# Patient Record
Sex: FEMALE | Race: WHITE | NOT HISPANIC OR LATINO | Employment: OTHER | URBAN - METROPOLITAN AREA
[De-identification: names, ages, dates, MRNs, and addresses within clinical notes are randomized per-mention and may not be internally consistent; named-entity substitution may affect disease eponyms.]

---

## 2023-08-11 PROBLEM — G45.9 TIA (TRANSIENT ISCHEMIC ATTACK): Status: ACTIVE | Noted: 2023-08-11

## 2023-08-11 PROBLEM — I10 HYPERTENSION: Status: ACTIVE | Noted: 2023-08-11

## 2023-08-11 PROBLEM — R73.9 HYPERGLYCEMIA: Status: ACTIVE | Noted: 2023-08-11

## 2023-08-11 PROBLEM — N18.30 CKD (CHRONIC KIDNEY DISEASE), STAGE III (MULTI): Status: ACTIVE | Noted: 2023-08-11

## 2023-08-11 PROBLEM — R53.83 FATIGUE: Status: ACTIVE | Noted: 2023-08-11

## 2023-08-11 PROBLEM — G60.0 CHARCOT-MARIE-TOOTH SYNDROME: Status: ACTIVE | Noted: 2023-08-11

## 2023-08-11 PROBLEM — E78.5 HYPERLIPEMIA: Status: ACTIVE | Noted: 2023-08-11

## 2023-08-11 PROBLEM — E55.9 VITAMIN D DEFICIENCY: Status: ACTIVE | Noted: 2023-08-11

## 2023-10-01 DIAGNOSIS — I10 PRIMARY HYPERTENSION: Primary | ICD-10-CM

## 2023-10-03 RX ORDER — LISINOPRIL 10 MG/1
10 TABLET ORAL DAILY
Qty: 90 TABLET | Refills: 0 | Status: SHIPPED | OUTPATIENT
Start: 2023-10-03

## 2023-10-03 RX ORDER — HYDROCHLOROTHIAZIDE 25 MG/1
25 TABLET ORAL DAILY
Qty: 90 TABLET | Refills: 0 | Status: SHIPPED | OUTPATIENT
Start: 2023-10-03 | End: 2024-02-27 | Stop reason: HOSPADM

## 2024-02-13 ENCOUNTER — APPOINTMENT (OUTPATIENT)
Dept: CARDIOLOGY | Facility: HOSPITAL | Age: 74
DRG: 056 | End: 2024-02-13
Payer: MEDICARE

## 2024-02-13 ENCOUNTER — APPOINTMENT (OUTPATIENT)
Dept: RADIOLOGY | Facility: HOSPITAL | Age: 74
DRG: 056 | End: 2024-02-13
Payer: MEDICARE

## 2024-02-13 ENCOUNTER — HOSPITAL ENCOUNTER (INPATIENT)
Facility: HOSPITAL | Age: 74
LOS: 14 days | Discharge: NURSING, REHAB OR LONG-TERM CARE | DRG: 056 | End: 2024-02-27
Attending: INTERNAL MEDICINE | Admitting: INTERNAL MEDICINE
Payer: MEDICARE

## 2024-02-13 DIAGNOSIS — R41.82 ALTERED MENTAL STATUS, UNSPECIFIED ALTERED MENTAL STATUS TYPE: Primary | ICD-10-CM

## 2024-02-13 PROBLEM — R41.0 CONFUSION: Status: ACTIVE | Noted: 2024-02-13

## 2024-02-13 LAB
ALBUMIN SERPL BCP-MCNC: 3.3 G/DL (ref 3.4–5)
ALP SERPL-CCNC: 80 U/L (ref 33–136)
ALT SERPL W P-5'-P-CCNC: 20 U/L (ref 7–45)
ANION GAP SERPL CALC-SCNC: 12 MMOL/L (ref 10–20)
AST SERPL W P-5'-P-CCNC: 35 U/L (ref 9–39)
BASOPHILS # BLD AUTO: 0.03 X10*3/UL (ref 0–0.1)
BASOPHILS NFR BLD AUTO: 0.4 %
BILIRUB SERPL-MCNC: 0.6 MG/DL (ref 0–1.2)
BUN SERPL-MCNC: 20 MG/DL (ref 6–23)
CALCIUM SERPL-MCNC: 8.8 MG/DL (ref 8.6–10.3)
CARDIAC TROPONIN I PNL SERPL HS: 31 NG/L (ref 0–13)
CARDIAC TROPONIN I PNL SERPL HS: 31 NG/L (ref 0–13)
CHLORIDE SERPL-SCNC: 106 MMOL/L (ref 98–107)
CHOLEST SERPL-MCNC: 260 MG/DL (ref 0–199)
CHOLESTEROL/HDL RATIO: 5.6
CK SERPL-CCNC: 213 U/L (ref 0–215)
CO2 SERPL-SCNC: 25 MMOL/L (ref 21–32)
CREAT SERPL-MCNC: 1.09 MG/DL (ref 0.5–1.05)
EGFRCR SERPLBLD CKD-EPI 2021: 54 ML/MIN/1.73M*2
EOSINOPHIL # BLD AUTO: 0.03 X10*3/UL (ref 0–0.4)
EOSINOPHIL NFR BLD AUTO: 0.4 %
ERYTHROCYTE [DISTWIDTH] IN BLOOD BY AUTOMATED COUNT: 13.2 % (ref 11.5–14.5)
GLUCOSE SERPL-MCNC: 103 MG/DL (ref 74–99)
HCT VFR BLD AUTO: 33.1 % (ref 36–46)
HDLC SERPL-MCNC: 46.5 MG/DL
HGB BLD-MCNC: 10.6 G/DL (ref 12–16)
IMM GRANULOCYTES # BLD AUTO: 0.01 X10*3/UL (ref 0–0.5)
IMM GRANULOCYTES NFR BLD AUTO: 0.1 % (ref 0–0.9)
LACTATE SERPL-SCNC: 0.5 MMOL/L (ref 0.4–2)
LDLC SERPL CALC-MCNC: 196 MG/DL
LYMPHOCYTES # BLD AUTO: 1.13 X10*3/UL (ref 0.8–3)
LYMPHOCYTES NFR BLD AUTO: 16.5 %
MAGNESIUM SERPL-MCNC: 1.9 MG/DL (ref 1.6–2.4)
MCH RBC QN AUTO: 31 PG (ref 26–34)
MCHC RBC AUTO-ENTMCNC: 32 G/DL (ref 32–36)
MCV RBC AUTO: 97 FL (ref 80–100)
MONOCYTES # BLD AUTO: 0.53 X10*3/UL (ref 0.05–0.8)
MONOCYTES NFR BLD AUTO: 7.7 %
NEUTROPHILS # BLD AUTO: 5.12 X10*3/UL (ref 1.6–5.5)
NEUTROPHILS NFR BLD AUTO: 74.9 %
NON HDL CHOLESTEROL: 214 MG/DL (ref 0–149)
NRBC BLD-RTO: 0 /100 WBCS (ref 0–0)
PLATELET # BLD AUTO: 251 X10*3/UL (ref 150–450)
POTASSIUM SERPL-SCNC: 4.2 MMOL/L (ref 3.5–5.3)
PROT SERPL-MCNC: 6.6 G/DL (ref 6.4–8.2)
RBC # BLD AUTO: 3.42 X10*6/UL (ref 4–5.2)
SODIUM SERPL-SCNC: 139 MMOL/L (ref 136–145)
TRIGL SERPL-MCNC: 90 MG/DL (ref 0–149)
VLDL: 18 MG/DL (ref 0–40)
WBC # BLD AUTO: 6.9 X10*3/UL (ref 4.4–11.3)

## 2024-02-13 PROCEDURE — 2500000004 HC RX 250 GENERAL PHARMACY W/ HCPCS (ALT 636 FOR OP/ED): Performed by: EMERGENCY MEDICINE

## 2024-02-13 PROCEDURE — 82550 ASSAY OF CK (CPK): CPT | Performed by: INTERNAL MEDICINE

## 2024-02-13 PROCEDURE — 84484 ASSAY OF TROPONIN QUANT: CPT | Performed by: EMERGENCY MEDICINE

## 2024-02-13 PROCEDURE — 80053 COMPREHEN METABOLIC PANEL: CPT | Performed by: INTERNAL MEDICINE

## 2024-02-13 PROCEDURE — 84484 ASSAY OF TROPONIN QUANT: CPT | Performed by: INTERNAL MEDICINE

## 2024-02-13 PROCEDURE — 80061 LIPID PANEL: CPT | Performed by: PHYSICIAN ASSISTANT

## 2024-02-13 PROCEDURE — 36415 COLL VENOUS BLD VENIPUNCTURE: CPT | Performed by: INTERNAL MEDICINE

## 2024-02-13 PROCEDURE — 71045 X-RAY EXAM CHEST 1 VIEW: CPT

## 2024-02-13 PROCEDURE — 70450 CT HEAD/BRAIN W/O DYE: CPT

## 2024-02-13 PROCEDURE — 99223 1ST HOSP IP/OBS HIGH 75: CPT | Performed by: PHYSICIAN ASSISTANT

## 2024-02-13 PROCEDURE — 99285 EMERGENCY DEPT VISIT HI MDM: CPT | Mod: 25 | Performed by: INTERNAL MEDICINE

## 2024-02-13 PROCEDURE — 1210000001 HC SEMI-PRIVATE ROOM DAILY

## 2024-02-13 PROCEDURE — 70450 CT HEAD/BRAIN W/O DYE: CPT | Performed by: RADIOLOGY

## 2024-02-13 PROCEDURE — 93005 ELECTROCARDIOGRAM TRACING: CPT

## 2024-02-13 PROCEDURE — 83735 ASSAY OF MAGNESIUM: CPT | Performed by: INTERNAL MEDICINE

## 2024-02-13 PROCEDURE — 71045 X-RAY EXAM CHEST 1 VIEW: CPT | Performed by: RADIOLOGY

## 2024-02-13 PROCEDURE — 83605 ASSAY OF LACTIC ACID: CPT | Performed by: INTERNAL MEDICINE

## 2024-02-13 PROCEDURE — 85025 COMPLETE CBC W/AUTO DIFF WBC: CPT | Performed by: INTERNAL MEDICINE

## 2024-02-13 RX ORDER — OLANZAPINE 10 MG/2ML
2.5 INJECTION, POWDER, FOR SOLUTION INTRAMUSCULAR EVERY 6 HOURS PRN
Status: DISCONTINUED | OUTPATIENT
Start: 2024-02-13 | End: 2024-02-18

## 2024-02-13 RX ADMIN — OLANZAPINE 2.5 MG: 10 INJECTION, POWDER, FOR SOLUTION INTRAMUSCULAR at 23:21

## 2024-02-13 ASSESSMENT — COLUMBIA-SUICIDE SEVERITY RATING SCALE - C-SSRS
6. HAVE YOU EVER DONE ANYTHING, STARTED TO DO ANYTHING, OR PREPARED TO DO ANYTHING TO END YOUR LIFE?: NO
2. HAVE YOU ACTUALLY HAD ANY THOUGHTS OF KILLING YOURSELF?: NO
1. IN THE PAST MONTH, HAVE YOU WISHED YOU WERE DEAD OR WISHED YOU COULD GO TO SLEEP AND NOT WAKE UP?: NO

## 2024-02-13 ASSESSMENT — PAIN - FUNCTIONAL ASSESSMENT: PAIN_FUNCTIONAL_ASSESSMENT: 0-10

## 2024-02-13 ASSESSMENT — ACTIVITIES OF DAILY LIVING (ADL): LACK_OF_TRANSPORTATION: PATIENT UNABLE TO ANSWER

## 2024-02-13 ASSESSMENT — PAIN SCALES - GENERAL: PAINLEVEL_OUTOF10: 0 - NO PAIN

## 2024-02-13 NOTE — ED PROVIDER NOTES
HPI     CC: Altered Mental Status (Pt from home, increased confusion, pt has been hallucinating, seeing people that aren't there, pt last seen by family on Friday. Deplorable living conditions per EMS, social work consult needed. Pt a&O x3 in triage. Pts BP elevated, but has not been taking BP meds per EMS)     HPI: Heather Romo is a 73 y.o. female with a history of HTN, HLD, TIA or CVA, CKD, Charcot-Rylie-Tooth, presents with confusion.  Patient herself is unable to provide a helpful history, states she is here because her blood pressure was elevated.  She is able to tell me that she lives alone in a house, that she has a sister around but that no one helps her on a daily basis.  States that she does not utilize any assistive devices to get around.  She denies feeling confused.  According to her sister who later came to bedside, she last saw the patient 4 days ago and she seemed okay.  The week prior patient had seemed like she was having difficulty getting the words out but this had resolved when she saw her last time.  Today, patient was not answering the phone.  When sister went to the house it was dark and the TV was off so she called 911.  The patient did eventually come to the door, was ambulatory but she seemed very confused.  She was talking about her  not being home even though he  40 years ago and was saying that she was going to see her mother who  3 years ago.   Sister wonders if maybe she had fallen.  She follows with Dr. Fiore, has not seen him in several years and stopped taking her medications.  Had a stroke several years ago at [place] but has not really followed up since.  Per EMS the house was in deplorable condition, smelled of urine with notable fecal material in the patient.     ROS: 10-point review of systems was performed and is otherwise negative except as noted in HPI.    Limitations to history: Confusion    Independent Historians: Sister at bedside     External Records  "Reviewed: Sone available    Past Medical History: Noncontributory except per HPI     Past Surgical History: Noncontributory except per HPI     Family History: Reviewed and noncontributory     Social History:  Denies tobacco. Denies ETOH. Denies illicit drugs.    Social Determinants Affecting Care: N/A    No Known Allergies    Home Meds:   Current Outpatient Medications   Medication Instructions    atorvastatin (LIPITOR) 80 mg, oral, Nightly    fenofibrate (Tricor) 145 mg tablet 1 tablet, oral, Daily    hydroCHLOROthiazide (HYDRODIURIL) 25 mg, oral, Daily    lisinopril 10 mg, oral, Daily        Physical Exam     ED Triage Vitals [02/13/24 1617]   Temperature Heart Rate Respirations BP   36.4 °C (97.6 °F) 63 18 173/59      Pulse Ox Temp Source Heart Rate Source Patient Position   97 % Temporal -- --      BP Location FiO2 (%)     -- --         Heart Rate:  [56-63]   Temperature:  [36.4 °C (97.6 °F)]   Respirations:  [18]   BP: (165-173)/(56-59)   Height:  [160 cm (5' 3\")]   Weight:  [74 kg (163 lb 1.6 oz)]   Pulse Ox:  [95 %-97 %]      Physical Exam  Vitals and nursing note reviewed.     CONSTITUTIONAL: Well appearing, well nourished, in no acute distress.   HENMT: Head atraumatic. No facial or scalp TTP. Airway patent. Nasal mucosa clear. Mouth with normal mucosa, clear oropharynx. Uvula midline. TMs clear bilaterally with no hemotympanum. Neck supple.    EYES: Clear bilaterally. No periorbital TTP.  Pupils equally round and reactive to light, extraocular movements grossly intact.    CARDIOVASCULAR: Normal rate, regular rhythm.  Heart sounds S1, S2.  No murmurs, rubs or gallops. Normal pulses. Capillary refill <2 sec.   RESPIRATORY: No increased work of breathing. Breath sounds clear and equal bilaterally.  GASTROINTESTINAL: Abdomen soft, non-distended, non-tender. No rebound, no guarding. Bowel sounds normal in all 4 quadrants. No palpable masses.   GENITOURINARY:  No CVA tenderness.  MUSCULOSKELETAL: No midline " C/T/L TTP or stepoffs. No other muscle or joint deformity or TTP. No edema.  NEUROLOGICAL: GCS 14. Alert and oriented to self, UH, but not date, no asymmetry, moving all extremities equally.  SKIN: Warm, dry and intact. No rash. No mayfield sign, raccoon eyes or other notable skin lesions except as noted above.   PSYCHIATRIC: Normal mood and affect.  HEME/LYMPH: No adenopathy or splenomegaly.    MDM:      Patient to be reevaluated once in formal ED bed.    Mai Romo MD  EM/IM/Peds    This note was dictated by speech recognition. Minor errors in transcription may be present.    Diagnostic Results      ECG: ECGs read and interpreted by me. See ED Course, below, for interpretation.    Labs Reviewed   COMPREHENSIVE METABOLIC PANEL - Abnormal       Result Value    Glucose 103 (*)     Sodium 139      Potassium 4.2      Chloride 106      Bicarbonate 25      Anion Gap 12      Urea Nitrogen 20      Creatinine 1.09 (*)     eGFR 54 (*)     Calcium 8.8      Albumin 3.3 (*)     Alkaline Phosphatase 80      Total Protein 6.6      AST 35      Bilirubin, Total 0.6      ALT 20     TROPONIN I, HIGH SENSITIVITY - Abnormal    Troponin I, High Sensitivity 31 (*)     Narrative:     Less than 99th percentile of normal range cutoff-  Female and children under 18 years old <14 ng/L; Male <21 ng/L: Negative  Repeat testing should be performed if clinically indicated.     Female and children under 18 years old 14-50 ng/L; Male 21-50 ng/L:  Consistent with possible cardiac damage and possible increased clinical   risk. Serial measurements may help to assess extent of myocardial damage.     >50 ng/L: Consistent with cardiac damage, increased clinical risk and  myocardial infarction. Serial measurements may help assess extent of   myocardial damage.      NOTE: Children less than 1 year old may have higher baseline troponin   levels and results should be interpreted in conjunction with the overall   clinical context.     NOTE: Troponin I  testing is performed using a different   testing methodology at [facility] than at other   [facility]. Direct result comparisons should only   be made within the same method.   CBC WITH AUTO DIFFERENTIAL - Abnormal    WBC 6.9      nRBC 0.0      RBC 3.42 (*)     Hemoglobin 10.6 (*)     Hematocrit 33.1 (*)     MCV 97      MCH 31.0      MCHC 32.0      RDW 13.2      Platelets 251      Neutrophils % 74.9      Immature Granulocytes %, Automated 0.1      Lymphocytes % 16.5      Monocytes % 7.7      Eosinophils % 0.4      Basophils % 0.4      Neutrophils Absolute 5.12      Immature Granulocytes Absolute, Automated 0.01      Lymphocytes Absolute 1.13      Monocytes Absolute 0.53      Eosinophils Absolute 0.03      Basophils Absolute 0.03     MAGNESIUM - Normal    Magnesium 1.90     LACTATE - Normal    Lactate 0.5      Narrative:     Venipuncture immediately after or during the administration of Metamizole may lead to falsely low results. Testing should be performed immediately  prior to Metamizole dosing.   CREATINE KINASE - Normal    Creatine Kinase 213     URINALYSIS WITH REFLEX CULTURE AND MICROSCOPIC    Narrative:     The following orders were created for panel order Urinalysis with Reflex Culture and Microscopic.  Procedure                               Abnormality         Status                     ---------                               -----------         ------                     Urinalysis with Reflex C...[[id number]]                                                 Extra Urine Gray Tube[[id number]]                                                         Please view results for these tests on the individual orders.   URINALYSIS WITH REFLEX CULTURE AND MICROSCOPIC   EXTRA URINE GRAY TUBE   URINALYSIS WITH REFLEX CULTURE AND MICROSCOPIC    Narrative:     The following orders were created for panel order Urinalysis with Reflex Culture and Microscopic.  Procedure                                Abnormality         Status                     ---------                               -----------         ------                     Urinalysis with Reflex C...[[id number]]                                                 Extra Urine Gray Tube[[id number]]                                                         Please view results for these tests on the individual orders.   URINALYSIS WITH REFLEX CULTURE AND MICROSCOPIC   EXTRA URINE GRAY TUBE         CT head wo IV contrast    (Results Pending)   XR chest 1 view    (Results Pending)                 [place] Coma Scale Score: 15                  Procedure  Procedures    ED Course & MDM   Assessment/Plan:   Heather Romo is a 73 y.o. female with a history of HTN, HLD, TIA or CVA, CKD, Charcot-Rylie-Tooth, presents with altered mental status, found living in deplorable conditions by her sister today after having seen her in a better state a few days ago.  Patient is neurologically intact but confused.  She has no complaints at this time other than her blood pressure being elevated.  Differential includes infectious or metabolic encephalopathy, CVA or traumatic intracranial injury.  Workup was initiated with ECG, labs, chest x-ray, CT head. See below for details of ED course and ultimate disposition.    Medications - No data to display     ED Course as of 02/13/24 1940 Tue Feb 13, 2024 1824 ECG read interpreted by me.  Sinus bradycardia, rate 56.  Normal axis.  Normal intervals.  No ST or T wave derangements. [CG]   1923 Labs notable for CBC without leukocytosis, mild anemia 10.6, normal platelets, CMP with mildly elevated creatinine 1.09, normal LFTs, mildly elevated troponin 31, normal CK, normal lactate. [CG]   1939 Patient is pending urinalysis, CT head, and chest x-ray at time of handoff to Dr. Adams. She will need admission for further workup.  [CG]      ED Course User Index  [CG] Mai Romo MD         Diagnoses as of 02/13/24 1940   Altered  mental status, unspecified altered mental status type       Disposition:   Handoff - Dr. Adams. Details of clinical presentation, medical decision-making, pending evaluation and disposition were discussed with oncoming physician. Please see their transition of care note for details of further ED course.     ED Prescriptions    None         Mai Romo MD  EM/IM/Peds    This note was dictated by speech recognition. Minor errors in transcription may be present.     Mai Romo MD  02/13/24 [id number]

## 2024-02-13 NOTE — PROGRESS NOTES
Heather Romo is a 73 y.o. female on day 0 of admission presenting with No Principal Problem: There is no principal problem currently on the Problem List. Please update the Problem List and refresh..     02/13/24 2080   Current Planned Discharge Disposition   Current Planned Discharge Disposition Inter  (presents  in the ED with a  need for a  memory care unit)     ALLAN Bcukley

## 2024-02-13 NOTE — PROGRESS NOTES
Heather Romo is a 73 y.o. female on day 0 of admission presenting with No Principal Problem: There is no principal problem currently on the Problem List. Please update the Problem List and refresh..     02/13/24 1630   ACS Disability Status   Are you deaf or do you have serious difficulty hearing? N   Are you blind or do you have serious difficulty seeing, even when wearing glasses? N   Because of a physical, mental, or emotional condition, do you have serious difficulty concentrating, remembering, or making decisions? (5 years old or older) Y   Do you have serious difficulty walking or climbing stairs? N   Do you have serious difficulty dressing or bathing? Y   Because of a physical, mental, or emotional condition, do you have serious difficulty doing errands alone such as visiting the doctor? Y

## 2024-02-13 NOTE — PROGRESS NOTES
Heather Romo is a 73 y.o. female on day 0 of admission presenting with No Principal Problem: There is no principal problem currently on the Problem List. Please update the Problem List and refresh..   [id number]   Discharge Planning   Living Arrangements Alone   Support Systems Family members   Assistance Needed ambulatory with self care deficits - incontinent; feces on shoes; +confusion   Type of Residence Private residence   Number of Stairs to Enter Residence 2   Number of Stairs Within Residence 8   Patient expects to be discharged to: will need facility   Does the patient need discharge transport arranged? Yes   RoundTrip coordination needed? Yes   Financial Resource Strain   How hard is it for you to pay for the very basics like food, housing, medical care, and heating?   (per EMS  pt is  very in debt with medical bills and  sister  has been trying to sell things  to pay for them)   Housing Stability   In the last 12 months, was there a time when you were not able to pay the mortgage or rent on time? Pt Unable   In the last 12 months, was there a time when you did not have a steady place to sleep or slept in a shelter (including now)? Pt Unable   Transportation Needs   In the past 12 months, has lack of transportation kept you from medical appointments or from getting medications? Pt Unable   In the past 12 months, has lack of transportation kept you from meetings, work, or from getting things needed for daily living? Pt Unable     Pt  was [facility]. SW  spoke with paramedic Moshe. He  reports  that they  were  called to the  home  by the patient's  sister. Sister  went to the  home  to sell some of pt's belongings  due to her  debt/bills.   Pt was increasingly  confused  and speaking about her    . Pt  also had feces on  feet  and  smelled of  urine.     Sister  reports pt is not  compliant with  her  BP meds.    Per  EMS  house is  very cluttered although there  are  pathways. There  is a  beam hanging from her  kitchen and  unsafe.  House  smells  of urine.     Moshe  reports that the  SW  from [facility]  will be informed  of  pt's  needs.     Sister is reportedly  on  the  way to the hospital  to share more info. No medical  records found on pt  since  2015.     Pt has [employer] Medicare, no Medicaid. As pt is ambulatory if  she does not have  a  skilled need  at  dc  info on pt's  finances   will be needed to  secure placement into a  facility. If  sister  is not HPOA  pt  would  also benefit  from a  capacity eval.       ALLAN Buckley

## 2024-02-14 ENCOUNTER — APPOINTMENT (OUTPATIENT)
Dept: CARDIOLOGY | Facility: HOSPITAL | Age: 74
DRG: 056 | End: 2024-02-14
Payer: MEDICARE

## 2024-02-14 ENCOUNTER — APPOINTMENT (OUTPATIENT)
Dept: RADIOLOGY | Facility: HOSPITAL | Age: 74
DRG: 056 | End: 2024-02-14
Payer: MEDICARE

## 2024-02-14 PROBLEM — R41.82 AMS (ALTERED MENTAL STATUS): Status: ACTIVE | Noted: 2024-02-14

## 2024-02-14 LAB
AMORPH CRY #/AREA UR COMP ASSIST: ABNORMAL /HPF
ANION GAP SERPL CALC-SCNC: 14 MMOL/L (ref 10–20)
APPEARANCE UR: ABNORMAL
ATRIAL RATE: 56 BPM
ATRIAL RATE: 67 BPM
BACTERIA #/AREA URNS AUTO: ABNORMAL /HPF
BILIRUB UR STRIP.AUTO-MCNC: NEGATIVE MG/DL
BUN SERPL-MCNC: 19 MG/DL (ref 6–23)
CALCIUM SERPL-MCNC: 8.9 MG/DL (ref 8.6–10.3)
CAOX CRY #/AREA UR COMP ASSIST: ABNORMAL /HPF
CARDIAC TROPONIN I PNL SERPL HS: 35 NG/L (ref 0–13)
CHLORIDE SERPL-SCNC: 109 MMOL/L (ref 98–107)
CO2 SERPL-SCNC: 24 MMOL/L (ref 21–32)
COLOR UR: YELLOW
CREAT SERPL-MCNC: 1.02 MG/DL (ref 0.5–1.05)
EGFRCR SERPLBLD CKD-EPI 2021: 58 ML/MIN/1.73M*2
ERYTHROCYTE [DISTWIDTH] IN BLOOD BY AUTOMATED COUNT: 13.4 % (ref 11.5–14.5)
GLUCOSE SERPL-MCNC: 76 MG/DL (ref 74–99)
GLUCOSE UR STRIP.AUTO-MCNC: NEGATIVE MG/DL
HCT VFR BLD AUTO: 36.8 % (ref 36–46)
HGB BLD-MCNC: 11.3 G/DL (ref 12–16)
KETONES UR STRIP.AUTO-MCNC: ABNORMAL MG/DL
LEUKOCYTE ESTERASE UR QL STRIP.AUTO: ABNORMAL
MCH RBC QN AUTO: 31.1 PG (ref 26–34)
MCHC RBC AUTO-ENTMCNC: 30.7 G/DL (ref 32–36)
MCV RBC AUTO: 101 FL (ref 80–100)
MUCOUS THREADS #/AREA URNS AUTO: ABNORMAL /LPF
NITRITE UR QL STRIP.AUTO: NEGATIVE
NRBC BLD-RTO: 0 /100 WBCS (ref 0–0)
P AXIS: 32 DEGREES
P AXIS: 50 DEGREES
P OFFSET: 179 MS
P OFFSET: 181 MS
P ONSET: 128 MS
P ONSET: 138 MS
PH UR STRIP.AUTO: 5 [PH]
PLATELET # BLD AUTO: 259 X10*3/UL (ref 150–450)
POTASSIUM SERPL-SCNC: 3.4 MMOL/L (ref 3.5–5.3)
PR INTERVAL: 158 MS
PR INTERVAL: 174 MS
PROT UR STRIP.AUTO-MCNC: ABNORMAL MG/DL
Q ONSET: 215 MS
Q ONSET: 217 MS
QRS COUNT: 10 BEATS
QRS COUNT: 9 BEATS
QRS DURATION: 78 MS
QRS DURATION: 78 MS
QT INTERVAL: 424 MS
QT INTERVAL: 444 MS
QTC CALCULATION(BAZETT): 428 MS
QTC CALCULATION(BAZETT): 448 MS
QTC FREDERICIA: 434 MS
QTC FREDERICIA: 440 MS
R AXIS: -2 DEGREES
R AXIS: 10 DEGREES
RBC # BLD AUTO: 3.63 X10*6/UL (ref 4–5.2)
RBC # UR STRIP.AUTO: NEGATIVE /UL
RBC #/AREA URNS AUTO: ABNORMAL /HPF
SODIUM SERPL-SCNC: 144 MMOL/L (ref 136–145)
SP GR UR STRIP.AUTO: 1.02
SQUAMOUS #/AREA URNS AUTO: ABNORMAL /HPF
T AXIS: 28 DEGREES
T AXIS: 39 DEGREES
T OFFSET: 429 MS
T OFFSET: 437 MS
UROBILINOGEN UR STRIP.AUTO-MCNC: 2 MG/DL
VENTRICULAR RATE: 56 BPM
VENTRICULAR RATE: 67 BPM
WBC # BLD AUTO: 7.5 X10*3/UL (ref 4.4–11.3)
WBC #/AREA URNS AUTO: ABNORMAL /HPF

## 2024-02-14 PROCEDURE — 2500000002 HC RX 250 W HCPCS SELF ADMINISTERED DRUGS (ALT 637 FOR MEDICARE OP, ALT 636 FOR OP/ED): Performed by: INTERNAL MEDICINE

## 2024-02-14 PROCEDURE — 81001 URINALYSIS AUTO W/SCOPE: CPT | Performed by: INTERNAL MEDICINE

## 2024-02-14 PROCEDURE — 97161 PT EVAL LOW COMPLEX 20 MIN: CPT | Mod: GP

## 2024-02-14 PROCEDURE — 97165 OT EVAL LOW COMPLEX 30 MIN: CPT | Mod: GO

## 2024-02-14 PROCEDURE — 2500000001 HC RX 250 WO HCPCS SELF ADMINISTERED DRUGS (ALT 637 FOR MEDICARE OP): Performed by: PHYSICIAN ASSISTANT

## 2024-02-14 PROCEDURE — 99223 1ST HOSP IP/OBS HIGH 75: CPT | Performed by: PSYCHIATRY & NEUROLOGY

## 2024-02-14 PROCEDURE — 80048 BASIC METABOLIC PNL TOTAL CA: CPT | Performed by: PHYSICIAN ASSISTANT

## 2024-02-14 PROCEDURE — 87086 URINE CULTURE/COLONY COUNT: CPT | Mod: AHULAB | Performed by: INTERNAL MEDICINE

## 2024-02-14 PROCEDURE — 36415 COLL VENOUS BLD VENIPUNCTURE: CPT | Performed by: PHYSICIAN ASSISTANT

## 2024-02-14 PROCEDURE — G0378 HOSPITAL OBSERVATION PER HR: HCPCS

## 2024-02-14 PROCEDURE — 1200000002 HC GENERAL ROOM WITH TELEMETRY DAILY

## 2024-02-14 PROCEDURE — 85027 COMPLETE CBC AUTOMATED: CPT | Performed by: PHYSICIAN ASSISTANT

## 2024-02-14 PROCEDURE — 99232 SBSQ HOSP IP/OBS MODERATE 35: CPT | Performed by: INTERNAL MEDICINE

## 2024-02-14 PROCEDURE — 93005 ELECTROCARDIOGRAM TRACING: CPT

## 2024-02-14 PROCEDURE — 84484 ASSAY OF TROPONIN QUANT: CPT | Performed by: PHYSICIAN ASSISTANT

## 2024-02-14 RX ORDER — ONDANSETRON 4 MG/1
4 TABLET, ORALLY DISINTEGRATING ORAL EVERY 8 HOURS PRN
Status: DISCONTINUED | OUTPATIENT
Start: 2024-02-14 | End: 2024-02-15

## 2024-02-14 RX ORDER — ONDANSETRON HYDROCHLORIDE 2 MG/ML
4 INJECTION, SOLUTION INTRAVENOUS EVERY 8 HOURS PRN
Status: DISCONTINUED | OUTPATIENT
Start: 2024-02-14 | End: 2024-02-15

## 2024-02-14 RX ORDER — SENNOSIDES 8.6 MG/1
2 TABLET ORAL 2 TIMES DAILY
Status: DISCONTINUED | OUTPATIENT
Start: 2024-02-14 | End: 2024-02-27 | Stop reason: HOSPADM

## 2024-02-14 RX ORDER — TALC
3 POWDER (GRAM) TOPICAL DAILY
Status: DISCONTINUED | OUTPATIENT
Start: 2024-02-14 | End: 2024-02-24

## 2024-02-14 RX ORDER — ACETAMINOPHEN 650 MG/1
650 SUPPOSITORY RECTAL EVERY 4 HOURS PRN
Status: DISCONTINUED | OUTPATIENT
Start: 2024-02-14 | End: 2024-02-27 | Stop reason: HOSPADM

## 2024-02-14 RX ORDER — ENOXAPARIN SODIUM 100 MG/ML
40 INJECTION SUBCUTANEOUS EVERY 24 HOURS
Status: DISCONTINUED | OUTPATIENT
Start: 2024-02-14 | End: 2024-02-27 | Stop reason: HOSPADM

## 2024-02-14 RX ORDER — ACETAMINOPHEN 325 MG/1
650 TABLET ORAL EVERY 4 HOURS PRN
Status: DISCONTINUED | OUTPATIENT
Start: 2024-02-14 | End: 2024-02-27 | Stop reason: HOSPADM

## 2024-02-14 RX ORDER — ACETAMINOPHEN 160 MG/5ML
650 SOLUTION ORAL EVERY 4 HOURS PRN
Status: DISCONTINUED | OUTPATIENT
Start: 2024-02-14 | End: 2024-02-27 | Stop reason: HOSPADM

## 2024-02-14 RX ORDER — ATORVASTATIN CALCIUM 80 MG/1
80 TABLET, FILM COATED ORAL NIGHTLY
Status: DISCONTINUED | OUTPATIENT
Start: 2024-02-14 | End: 2024-02-27 | Stop reason: HOSPADM

## 2024-02-14 RX ORDER — POTASSIUM CHLORIDE 20 MEQ/1
40 TABLET, EXTENDED RELEASE ORAL ONCE
Status: COMPLETED | OUTPATIENT
Start: 2024-02-14 | End: 2024-02-14

## 2024-02-14 RX ORDER — LISINOPRIL 10 MG/1
10 TABLET ORAL DAILY
Status: DISCONTINUED | OUTPATIENT
Start: 2024-02-14 | End: 2024-02-27 | Stop reason: HOSPADM

## 2024-02-14 RX ADMIN — LISINOPRIL 10 MG: 10 TABLET ORAL at 08:29

## 2024-02-14 RX ADMIN — ACETAMINOPHEN 650 MG: 325 TABLET ORAL at 01:13

## 2024-02-14 RX ADMIN — POTASSIUM CHLORIDE 40 MEQ: 1500 TABLET, EXTENDED RELEASE ORAL at 11:15

## 2024-02-14 RX ADMIN — Medication 3 MG: at 01:13

## 2024-02-14 SDOH — SOCIAL STABILITY: SOCIAL INSECURITY: HAVE YOU HAD THOUGHTS OF HARMING ANYONE ELSE?: NO

## 2024-02-14 SDOH — ECONOMIC STABILITY: TRANSPORTATION INSECURITY
IN THE PAST 12 MONTHS, HAS THE LACK OF TRANSPORTATION KEPT YOU FROM MEDICAL APPOINTMENTS OR FROM GETTING MEDICATIONS?: NO

## 2024-02-14 SDOH — ECONOMIC STABILITY: TRANSPORTATION INSECURITY
IN THE PAST 12 MONTHS, HAS LACK OF TRANSPORTATION KEPT YOU FROM MEETINGS, WORK, OR FROM GETTING THINGS NEEDED FOR DAILY LIVING?: NO

## 2024-02-14 SDOH — ECONOMIC STABILITY: INCOME INSECURITY
HOW HARD IS IT FOR YOU TO PAY FOR THE VERY BASICS LIKE FOOD, HOUSING, MEDICAL CARE, AND HEATING?: PATIENT UNABLE TO ANSWER

## 2024-02-14 SDOH — SOCIAL STABILITY: SOCIAL INSECURITY: WERE YOU ABLE TO COMPLETE ALL THE BEHAVIORAL HEALTH SCREENINGS?: YES

## 2024-02-14 SDOH — SOCIAL STABILITY: SOCIAL INSECURITY: ARE YOU OR HAVE YOU BEEN THREATENED OR ABUSED PHYSICALLY, EMOTIONALLY, OR SEXUALLY BY ANYONE?: NO

## 2024-02-14 SDOH — ECONOMIC STABILITY: INCOME INSECURITY: IN THE LAST 12 MONTHS, WAS THERE A TIME WHEN YOU WERE NOT ABLE TO PAY THE MORTGAGE OR RENT ON TIME?: NO

## 2024-02-14 SDOH — SOCIAL STABILITY: SOCIAL INSECURITY: ARE THERE ANY APPARENT SIGNS OF INJURIES/BEHAVIORS THAT COULD BE RELATED TO ABUSE/NEGLECT?: NO

## 2024-02-14 SDOH — SOCIAL STABILITY: SOCIAL INSECURITY: DO YOU FEEL ANYONE HAS EXPLOITED OR TAKEN ADVANTAGE OF YOU FINANCIALLY OR OF YOUR PERSONAL PROPERTY?: NO

## 2024-02-14 SDOH — SOCIAL STABILITY: SOCIAL INSECURITY: ABUSE: ADULT

## 2024-02-14 SDOH — ECONOMIC STABILITY: HOUSING INSECURITY
IN THE LAST 12 MONTHS, WAS THERE A TIME WHEN YOU DID NOT HAVE A STEADY PLACE TO SLEEP OR SLEPT IN A SHELTER (INCLUDING NOW)?: NO

## 2024-02-14 SDOH — SOCIAL STABILITY: SOCIAL INSECURITY: HAS ANYONE EVER THREATENED TO HURT YOUR FAMILY OR YOUR PETS?: NO

## 2024-02-14 SDOH — ECONOMIC STABILITY: HOUSING INSECURITY: IN THE LAST 12 MONTHS, HOW MANY PLACES HAVE YOU LIVED?: 1

## 2024-02-14 SDOH — SOCIAL STABILITY: SOCIAL INSECURITY: DO YOU FEEL UNSAFE GOING BACK TO THE PLACE WHERE YOU ARE LIVING?: NO

## 2024-02-14 SDOH — SOCIAL STABILITY: SOCIAL INSECURITY: DOES ANYONE TRY TO KEEP YOU FROM HAVING/CONTACTING OTHER FRIENDS OR DOING THINGS OUTSIDE YOUR HOME?: NO

## 2024-02-14 ASSESSMENT — LIFESTYLE VARIABLES
AUDIT-C TOTAL SCORE: 0
PRESCIPTION_ABUSE_PAST_12_MONTHS: NO
AUDIT-C TOTAL SCORE: 0
HOW OFTEN DO YOU HAVE 6 OR MORE DRINKS ON ONE OCCASION: NEVER
HOW MANY STANDARD DRINKS CONTAINING ALCOHOL DO YOU HAVE ON A TYPICAL DAY: PATIENT DOES NOT DRINK
HOW OFTEN DO YOU HAVE A DRINK CONTAINING ALCOHOL: NEVER
SKIP TO QUESTIONS 9-10: 1
SUBSTANCE_ABUSE_PAST_12_MONTHS: NO

## 2024-02-14 ASSESSMENT — COGNITIVE AND FUNCTIONAL STATUS - GENERAL
STANDING UP FROM CHAIR USING ARMS: A LITTLE
DRESSING REGULAR LOWER BODY CLOTHING: A LOT
MOVING TO AND FROM BED TO CHAIR: A LITTLE
TOILETING: A LITTLE
CLIMB 3 TO 5 STEPS WITH RAILING: A LOT
MOVING TO AND FROM BED TO CHAIR: A LITTLE
TURNING FROM BACK TO SIDE WHILE IN FLAT BAD: A LITTLE
DAILY ACTIVITIY SCORE: 14
PATIENT BASELINE BEDBOUND: NO
DRESSING REGULAR LOWER BODY CLOTHING: A LITTLE
MOVING FROM LYING ON BACK TO SITTING ON SIDE OF FLAT BED WITH BEDRAILS: A LITTLE
MOVING FROM LYING ON BACK TO SITTING ON SIDE OF FLAT BED WITH BEDRAILS: A LITTLE
EATING MEALS: A LITTLE
CLIMB 3 TO 5 STEPS WITH RAILING: A LOT
PERSONAL GROOMING: A LITTLE
DRESSING REGULAR UPPER BODY CLOTHING: A LOT
HELP NEEDED FOR BATHING: A LOT
DRESSING REGULAR UPPER BODY CLOTHING: A LITTLE
TOILETING: A LOT
WALKING IN HOSPITAL ROOM: A LOT
MOBILITY SCORE: 15
STANDING UP FROM CHAIR USING ARMS: A LOT
DAILY ACTIVITIY SCORE: 17
STANDING UP FROM CHAIR USING ARMS: A LOT
WALKING IN HOSPITAL ROOM: A LOT
MOVING FROM LYING ON BACK TO SITTING ON SIDE OF FLAT BED WITH BEDRAILS: A LITTLE
MOBILITY SCORE: 15
DAILY ACTIVITIY SCORE: 14
EATING MEALS: A LITTLE
MOVING TO AND FROM BED TO CHAIR: A LITTLE
DRESSING REGULAR UPPER BODY CLOTHING: A LOT
CLIMB 3 TO 5 STEPS WITH RAILING: TOTAL
PERSONAL GROOMING: A LITTLE
HELP NEEDED FOR BATHING: A LOT
WALKING IN HOSPITAL ROOM: A LITTLE
MOBILITY SCORE: 16
TOILETING: A LOT
DRESSING REGULAR LOWER BODY CLOTHING: A LOT
TURNING FROM BACK TO SIDE WHILE IN FLAT BAD: A LITTLE
EATING MEALS: A LITTLE
TURNING FROM BACK TO SIDE WHILE IN FLAT BAD: A LITTLE
HELP NEEDED FOR BATHING: A LOT
PERSONAL GROOMING: A LITTLE

## 2024-02-14 ASSESSMENT — ACTIVITIES OF DAILY LIVING (ADL)
GROOMING: NEEDS ASSISTANCE
ADEQUATE_TO_COMPLETE_ADL: YES
ADL_ASSISTANCE: INDEPENDENT
DRESSING YOURSELF: NEEDS ASSISTANCE
ADL_ASSISTANCE: INDEPENDENT
PATIENT'S MEMORY ADEQUATE TO SAFELY COMPLETE DAILY ACTIVITIES?: NO
JUDGMENT_ADEQUATE_SAFELY_COMPLETE_DAILY_ACTIVITIES: NO
WALKS IN HOME: INDEPENDENT
TOILETING: NEEDS ASSISTANCE
FEEDING YOURSELF: INDEPENDENT
HEARING - RIGHT EAR: FUNCTIONAL
BATHING: NEEDS ASSISTANCE
HEARING - LEFT EAR: FUNCTIONAL

## 2024-02-14 ASSESSMENT — PATIENT HEALTH QUESTIONNAIRE - PHQ9
1. LITTLE INTEREST OR PLEASURE IN DOING THINGS: NOT AT ALL
2. FEELING DOWN, DEPRESSED OR HOPELESS: NOT AT ALL
SUM OF ALL RESPONSES TO PHQ9 QUESTIONS 1 & 2: 0

## 2024-02-14 ASSESSMENT — COLUMBIA-SUICIDE SEVERITY RATING SCALE - C-SSRS
2. HAVE YOU ACTUALLY HAD ANY THOUGHTS OF KILLING YOURSELF?: NO
1. IN THE PAST MONTH, HAVE YOU WISHED YOU WERE DEAD OR WISHED YOU COULD GO TO SLEEP AND NOT WAKE UP?: NO
6. HAVE YOU EVER DONE ANYTHING, STARTED TO DO ANYTHING, OR PREPARED TO DO ANYTHING TO END YOUR LIFE?: NO

## 2024-02-14 ASSESSMENT — PAIN - FUNCTIONAL ASSESSMENT
PAIN_FUNCTIONAL_ASSESSMENT: 0-10

## 2024-02-14 ASSESSMENT — PAIN DESCRIPTION - ORIENTATION: ORIENTATION: LEFT;RIGHT

## 2024-02-14 ASSESSMENT — PAIN DESCRIPTION - LOCATION: LOCATION: LEG

## 2024-02-14 ASSESSMENT — PAIN SCALES - GENERAL
PAINLEVEL_OUTOF10: 0 - NO PAIN
PAINLEVEL_OUTOF10: 0 - NO PAIN

## 2024-02-14 NOTE — PROGRESS NOTES
Occupational Therapy    Evaluation    Patient Name: Heather Romo  MRN: [id number]  Today's Date: 2/14/2024  Time Calculation  Start Time: 1322  Stop Time: 1335  Time Calculation (min): 13 min    Assessment  IP OT Assessment  OT Assessment: 74 yo presenting with AMS and decline in ability to care for self at home. Pt presents with impaired cognition, decr. memory and orientation, impulsivity and poor safety/insight. Inc assist for functional mobility and ADLs. WIll continue per POC. will benefit from MoCA next session  Prognosis: Good  Medical Staff Made Aware: Yes  End of Session Communication: Bedside nurse  End of Session Patient Position: Up in chair, Alarm off, not on at start of session  Plan:  Treatment Interventions: ADL retraining, Functional transfer training, UE strengthening/ROM, Endurance training, Cognitive reorientation, Patient/family training, Equipment evaluation/education  OT Frequency: 3 times per week  OT Discharge Recommendations: Moderate intensity level of continued care  OT - OK to Discharge: Yes    Subjective   Current Problem:  1. Altered mental status, unspecified altered mental status type          General:  General  Reason for Referral: decline in ADLs. AMS  Referred By: Katy  Past Medical History Relevant to Rehab:   Past Medical History:   Diagnosis Date    Personal history of transient ischemic attack (TIA), and cerebral infarction without residual deficits 08/07/2014    History of transient cerebral ischemia     Co-Treatment: PT  Co-Treatment Reason: To increase patient safety, transfer ability, and participation.  Prior to Session Communication: Bedside nurse  Patient Position Received:  (Sitting EOB, sister and sitter present)  General Comment: Pt confused but agreeable to eval. Sister assists with providing history as patient is an unreliable historian.  Precautions:  Medical Precautions: Fall precautions    Objective   Cognition:  Overall Cognitive Status:  Impaired  Orientation Level: Disoriented to place, Disoriented to time, Disoriented to situation  Attention: Exceptions to WFL (pt able to follow one step commands)  Alternating Attention: Impaired  Divided Attention: Impaired  Sustained Attention: Impaired  Memory: Exceptions to WFL  Short-Term Memory:  (poor ability to provide accurate PLOF, pt sister present and providing information)  Problem Solving: Exceptions to WFL  Safety/Judgement: Exceptions to WFL  Insight: Severe  Impulsive: Mildly  Flexibility of Thought: Reduced flexibility  Planning: Reduced planning skills  Organization: Severely disorganized        Home Living:  Type of Home: House  Lives With: Alone  Home Adaptive Equipment: None  Home Layout: Two level, Bed/bath upstairs, Stairs to alternate level with rails  Alternate Level Stairs-Rails: Both  Alternate Level Stairs-Number of Steps: 13  Home Access: Stairs to enter without rails  Entrance Stairs-Number of Steps: 2  Bathroom Shower/Tub: Tub/shower unit (pt sister states bathroom is in dissarray)  Bathroom Equipment: None   Prior Function:  Level of [place]: Independent with ADLs and functional transfers  ADL Assistance: Independent  Homemaking Assistance:  (microwaves meals, does not clean regularly, completes laundry at a laundromat)  Ambulatory Assistance: Independent (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 1 recent fall. Descends the stairs backwards using surjit HRs.  IADL History:  Homemaking Responsibilities: Yes  ADL:  LE Dressing Deficit:  (pt able to tie shoes with increased time, bends at waist to reach shoes)  ADL Comments: AFOs already donned with shoes , required cues for awarenes of shoes not being tied for safety hazard  Activity Tolerance:  Endurance: Endurance does not limit participation in activity  Bed Mobility/Transfers:      Transfers  Transfer: Yes  Transfer 1  Technique 1: Sit to stand, Stand to sit  Transfer Device 1:  Walker  Transfer Level of Assistance 1: Contact guard  Trials/Comments 1: max v/t cues for hand placement and coordination of using ww    Ambulation/Gait Training:  Ambulation/Gait Training  Ambulation/Gait Training Performed: Yes (ambulated in room with ww, max cues with min assist/cga to maintain proximity within ww. poor safety awareness and insight)  Sitting Balance:  Static Sitting Balance  Static Sitting-Level of Assistance: Distant supervision  Dynamic Sitting Balance  Dynamic Sitting-Balance:  (close supervision)  Standing Balance:  Static Standing Balance  Static Standing-Level of Assistance: Contact guard  Dynamic Standing Balance  Dynamic Standing-Balance:  (min ax1)    Coordination:  Movements are Fluid and Coordinated: No   Hand Function:  Hand Function  Gross Grasp: Impaired (impaired due to limited end ROM of digits 2/2 charcot sue-tooth)  Extremities: RUE   RUE : Within Functional Limits (decr. AROM in bilateral digits, slight flexion contractures, able to use functionally for all tasks though with some increased time), LUE   LUE: Within Functional Limits,  , and      Outcome Measures:   [facility] Daily Activity  Putting on and taking off regular lower body clothing: A little  Bathing (including washing, rinsing, drying): A lot  Putting on and taking off regular upper body clothing: A little  Toileting, which includes using toilet, bedpan or urinal: A little  Taking care of personal grooming such as brushing teeth: A little  Eating Meals: A little  Daily Activity - Total Score: 17      Education Documentation  ADL Training, taught by Brandon Stevenson, OT at 2/14/2024  5:05 PM.  Learner: Patient  Readiness: Acceptance  Method: Explanation  Response: No Evidence of Learning    Education Comments  No comments found.      Goals:   Encounter Problems       Encounter Problems (Active)       ADLs       Patient will perform UB and LB sponge bathing  with setup assist level of assistance.       Start:  02/14/24     Expected End:  02/28/24            Patient will complete daily grooming tasks with modified independent level of assistance and PRN adaptive equipment while standing.       Start:  02/14/24    Expected End:  02/28/24            Patient will complete toileting including hygiene clothing management/hygiene with modified independent level of assistance and raised toilet seat and grab bars.       Start:  02/14/24    Expected End:  02/28/24               COGNITION/SAFETY       Patient will follow 100% Two step commands to allow improved ADL performance.       Start:  02/14/24    Expected End:  02/28/24            Patient will demonstrated orientation x 3 with 1 verbal cue.       Start:  02/14/24    Expected End:  02/28/24       ORIENTATION            MOBILITY       Patient will perform Functional mobility  Household distances/Community Distances with  mod independence level of assistance and least restrictive device in order to improve safety and functional mobility.       Start:  02/14/24    Expected End:  02/28/24

## 2024-02-14 NOTE — CONSULTS
INITIAL NEUROLOGY CONSULT NOTE    IMPRESSION:  Presentation suggests dementia of Alzheimer's type.  I cannot absolutely rule out an acute intracranial process.  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    RECOMMENDATIONS:  B12, TSH, syphilis screen.  Cranial MRI to rule out acute pathology.  Will likely need placement.  Discussed the need for POA or guardianship with the sister.    Art Dumont Jr., M.D., [facility]       History Of Present Illness  Heather Romo is a 73 y.o. female presenting with altered mentation.  History comes from EMR review, as other caregivers have obtained history from her sister.  Her sister, Renae, is in the room at the time of my visit and offers additional history.    She apparently resides alone.  Her sister noted that 9 days ago, she was not answering her phone, which is unusual for her.  Four days ago, she appeared well.  Yesterday, she was not answering her phone again, so she went to the patient's house.  The patient didn't open the door.  The sister initiated a welfare check via the local police.  The patient eventually opened the door and let the police and the sister in.    She was found in her home, confused and hallucinating.  Among her hallucinations was talk about her , who  40 years ago, and trying to see her mother, who  three years ago.  The home was unkempt and smelled of urine.  He ceiling of her kitchen was partially collapsed and apparently had been for some time.  She was brought to the [facility] ED, where no specific findings were noted on workup.    Her sister reports further that the patient wasn't particularly forgetful, but was always private such that it was only in the last 3-4 months that the patient allowed access to her home, and then only to parts of her home.     Past Medical History  Past Medical History:   Diagnosis Date    Personal history of transient ischemic attack (TIA), and cerebral infarction without residual  deficits 08/07/2014    History of transient cerebral ischemia     Surgical History  Past Surgical History:   Procedure Laterality Date    HAND SURGERY  08/07/2014    Hand Surgery                                                                                                                                                          MR HEAD ANGIO WO IV CONTRAST  7/10/2014    MR HEAD ANGIO WO IV CONTRAST [facility]2014 [facility]    MR HEAD ANGIO WO IV CONTRAST  8/19/2015    MR HEAD ANGIO WO IV CONTRAST 8/19/2015 [facility]    MR NECK ANGIO WO IV CONTRAST  7/10/2014    MR NECK ANGIO WO IV CONTRAST 7/10/2014 [facility]    MR NECK ANGIO WO IV CONTRAST  8/19/2015    MR NECK ANGIO WO IV CONTRAST 8/19/2015 [facility]     Social History       Scheduled medications  atorvastatin, 80 mg, oral, Nightly  enoxaparin, 40 mg, subcutaneous, q24h  lisinopril, 10 mg, oral, Daily  melatonin, 3 mg, oral, Daily  sennosides, 2 tablet, oral, BID      Continuous medications     PRN medications  PRN medications: acetaminophen **OR** acetaminophen **OR** acetaminophen, OLANZapine, ondansetron ODT **OR** ondansetron      No family history on file.  Allergies  Patient has no known allergies.  Medications Prior to Admission   Medication Sig Dispense Refill Last Dose    atorvastatin (Lipitor) 80 mg tablet Take 1 tablet (80 mg) by mouth once daily at bedtime.       fenofibrate (Tricor) 145 mg tablet Take 1 tablet (145 mg) by mouth once daily.       hydroCHLOROthiazide (HYDRODiuril) 25 mg tablet TAKE 1 TABLET BY MOUTH DAILY 90 tablet 0     lisinopril 10 mg tablet TAKE 1 TABLET BY MOUTH EVERY DAY 90 tablet 0        Scheduled medications  atorvastatin, 80 mg, oral, Nightly  enoxaparin, 40 mg, subcutaneous, q24h  lisinopril, 10 mg, oral, Daily  melatonin, 3 mg, oral, Daily  sennosides, 2 tablet, oral, BID      Continuous medications     PRN medications  PRN medications: acetaminophen **OR** acetaminophen **OR**  "acetaminophen, OLANZapine, ondansetron ODT **OR** ondansetron    Review of Systems    Last Recorded Vitals  Blood pressure 141/65, pulse 60, temperature 36.3 °C (97.3 °F), temperature source Temporal, resp. rate 19, height 1.6 m (5' 3\"), weight 74 kg (163 lb 1.6 oz), SpO2 99 %.    CONSTITUTIONAL:  No acute distress    CARDIOVASCULAR:  Normal pulses in the distal legs, no edema of either arm or either leg.  No carotid bruits.    MENTAL STATUS:  Awake, alert, oriented to self, but not to place or time, with poor short-term memory (0/3 5-minute recall), poor awareness of recent events, reduced attention span, concentration, and fund of knowledge. MMSE is 13/30 with deficits of 3/5 orientation to place, 1/5 orientation to time, 0/5 attention, 2/3 immediate recall, 0/1 drawing, 0/1 writing, 0/3 5-minute recall.    SPEECH AND LANGUAGE:  Can name and repeat, follows all commands, has no dysarthria    FUNDOSCOPIC:  No papilledema    CRANIAL NERVES:  II-Vision present, visual fields full to confrontational testing    III/IV/VI--EOMs are present in all directions.  Pupils are symmetrically reactive in dim light.  No ptosis.    V--Normal facial sensation.    VII--No facial asymmetry.    VIII--Hearing present to voice bilaterally.    IX/X--Symmetric soft palate rise.    XI--Normal trapezius power bilaterally.    XII--Tongue protrudes without deviation.    MOTOR:  Atrophy the thenar eminence bilaterally, and of the interossei of both hands.  0/5 abductor pollicis brevis, 3/5 interossei bilaterally.  Finger flexion and extension 4/5.  Normal power of biceps, triceps, deltoids.  0/5 foot inversion and eversion bilaterally, 0/5 toe flexion and extension with presence of hammer toes, 0/5 foot plantarflexion and dorsiflexion.  Normal quads, hams, iliacus.    SENSORY:  Reduced pin sensation in a stocking distribution from the feet to the knees, and from the fingers to the elbows, without asymmetry or spinal sensory " level.    COORDINATION:  Normal finger-to-nose and heel-to-shin testing in both arms and both legs.    REFLEXES are symmetrically absent at the biceps, triceps, brachioradialis, patella, and ankle.  The plantar responses are equivocal bilaterally.    GAIT is abnormal as she has bilateral steppage and requires assist of one to ambulate, even with bilateral AFO braces on.  No ataxia, shuffling, or spasticity.    Relevant Results  Results for orders placed or performed during the hospital encounter of 02/13/24 (from the past 24 hour(s))   ECG 12 lead   Result Value Ref Range    Ventricular Rate 56 BPM    Atrial Rate 56 BPM    MI Interval 174 ms    QRS Duration 78 ms    QT Interval 444 ms    QTC Calculation(Bazett) 428 ms    P Axis 32 degrees    R Axis -2 degrees    T Axis 28 degrees    QRS Count 9 beats    Q Onset 215 ms    P Onset 128 ms    P Offset 179 ms    T Offset 437 ms    QTC Fredericia 434 ms   Comprehensive metabolic panel   Result Value Ref Range    Glucose 103 (H) 74 - 99 mg/dL    Sodium 139 136 - 145 mmol/L    Potassium 4.2 3.5 - 5.3 mmol/L    Chloride 106 98 - 107 mmol/L    Bicarbonate 25 21 - 32 mmol/L    Anion Gap 12 10 - 20 mmol/L    Urea Nitrogen 20 6 - 23 mg/dL    Creatinine 1.09 (H) 0.50 - 1.05 mg/dL    eGFR 54 (L) >60 mL/min/1.73m*2    Calcium 8.8 8.6 - 10.3 mg/dL    Albumin 3.3 (L) 3.4 - 5.0 g/dL    Alkaline Phosphatase 80 33 - 136 U/L    Total Protein 6.6 6.4 - 8.2 g/dL    AST 35 9 - 39 U/L    Bilirubin, Total 0.6 0.0 - 1.2 mg/dL    ALT 20 7 - 45 U/L   Magnesium   Result Value Ref Range    Magnesium 1.90 1.60 - 2.40 mg/dL   Troponin I, High Sensitivity   Result Value Ref Range    Troponin I, High Sensitivity 31 (H) 0 - 13 ng/L   Creatine Kinase   Result Value Ref Range    Creatine Kinase 213 0 - 215 U/L   Lactate   Result Value Ref Range    Lactate 0.5 0.4 - 2.0 mmol/L   CBC and Auto Differential   Result Value Ref Range    WBC 6.9 4.4 - 11.3 x10*3/uL    nRBC 0.0 0.0 - 0.0 /100 WBCs    RBC 3.42  (L) 4.00 - 5.20 x10*6/uL    Hemoglobin 10.6 (L) 12.0 - 16.0 g/dL    Hematocrit 33.1 (L) 36.0 - 46.0 %    MCV 97 80 - 100 fL    MCH 31.0 26.0 - 34.0 pg    MCHC 32.0 32.0 - 36.0 g/dL    RDW 13.2 11.5 - 14.5 %    Platelets 251 150 - 450 x10*3/uL    Neutrophils % 74.9 40.0 - 80.0 %    Immature Granulocytes %, Automated 0.1 0.0 - 0.9 %    Lymphocytes % 16.5 13.0 - 44.0 %    Monocytes % 7.7 2.0 - 10.0 %    Eosinophils % 0.4 0.0 - 6.0 %    Basophils % 0.4 0.0 - 2.0 %    Neutrophils Absolute 5.12 1.60 - 5.50 x10*3/uL    Immature Granulocytes Absolute, Automated 0.01 0.00 - 0.50 x10*3/uL    Lymphocytes Absolute 1.13 0.80 - 3.00 x10*3/uL    Monocytes Absolute 0.53 0.05 - 0.80 x10*3/uL    Eosinophils Absolute 0.03 0.00 - 0.40 x10*3/uL    Basophils Absolute 0.03 0.00 - 0.10 x10*3/uL   Troponin I, High Sensitivity   Result Value Ref Range    Troponin I, High Sensitivity 31 (H) 0 - 13 ng/L   Lipid panel   Result Value Ref Range    Cholesterol 260 (H) 0 - 199 mg/dL    HDL-Cholesterol 46.5 mg/dL    Cholesterol/HDL Ratio 5.6     LDL Calculated 196 (H) <=99 mg/dL    VLDL 18 0 - 40 mg/dL    Triglycerides 90 0 - 149 mg/dL    Non HDL Cholesterol 214 (H) 0 - 149 mg/dL   ECG 12 lead   Result Value Ref Range    Ventricular Rate 67 BPM    Atrial Rate 67 BPM    KS Interval 158 ms    QRS Duration 78 ms    QT Interval 424 ms    QTC Calculation(Bazett) 448 ms    P Axis 50 degrees    R Axis 10 degrees    T Axis 39 degrees    QRS Count 10 beats    Q Onset 217 ms    P Onset 138 ms    P Offset 181 ms    T Offset 429 ms    QTC Fredericia 440 ms   Troponin I, High Sensitivity   Result Value Ref Range    Troponin I, High Sensitivity 35 (H) 0 - 13 ng/L   CBC   Result Value Ref Range    WBC 7.5 4.4 - 11.3 x10*3/uL    nRBC 0.0 0.0 - 0.0 /100 WBCs    RBC 3.63 (L) 4.00 - 5.20 x10*6/uL    Hemoglobin 11.3 (L) 12.0 - 16.0 g/dL    Hematocrit 36.8 36.0 - 46.0 %     (H) 80 - 100 fL    MCH 31.1 26.0 - 34.0 pg    MCHC 30.7 (L) 32.0 - 36.0 g/dL    RDW 13.4  11.5 - 14.5 %    Platelets 259 150 - 450 x10*3/uL   Basic metabolic panel   Result Value Ref Range    Glucose 76 74 - 99 mg/dL    Sodium 144 136 - 145 mmol/L    Potassium 3.4 (L) 3.5 - 5.3 mmol/L    Chloride 109 (H) 98 - 107 mmol/L    Bicarbonate 24 21 - 32 mmol/L    Anion Gap 14 10 - 20 mmol/L    Urea Nitrogen 19 6 - 23 mg/dL    Creatinine 1.02 0.50 - 1.05 mg/dL    eGFR 58 (L) >60 mL/min/1.73m*2    Calcium 8.9 8.6 - 10.3 mg/dL   Urinalysis with Reflex Culture and Microscopic   Result Value Ref Range    Color, Urine Yellow Straw, Yellow    Appearance, Urine Hazy (N) Clear    Specific Gravity, Urine 1.021 1.005 - 1.035    pH, Urine 5.0 5.0, 5.5, 6.0, 6.5, 7.0, 7.5, 8.0    Protein, Urine 100 (2+) (N) NEGATIVE mg/dL    Glucose, Urine NEGATIVE NEGATIVE mg/dL    Blood, Urine NEGATIVE NEGATIVE    Ketones, Urine 20 (1+) (A) NEGATIVE mg/dL    Bilirubin, Urine NEGATIVE NEGATIVE    Urobilinogen, Urine 2.0 (N) <2.0 mg/dL    Nitrite, Urine NEGATIVE NEGATIVE    Leukocyte Esterase, Urine LARGE (3+) (A) NEGATIVE   Microscopic Only, Urine   Result Value Ref Range    WBC, Urine 11-20 (A) 1-5, NONE /HPF    RBC, Urine 1-2 NONE, 1-2, 3-5 /HPF    Squamous Epithelial Cells, Urine 10-25 (FEW) Reference range not established. /HPF    Bacteria, Urine 1+ (A) NONE SEEN /HPF    Mucus, Urine 1+ Reference range not established. /LPF    Calcium Oxalate Crystals, Urine 3+ (A) NONE, 1+ /HPF    Amorphous Crystals, Urine 1+ NONE, 1+, 2+ /HPF         I have personally reviewed the following imaging results:  CT head wo IV contrast    Result Date: 2/13/2024  Interpreted By:  Renny Cast, STUDY: CT HEAD WO IV CONTRAST;  2/13/2024 8:30 pm   INDICATION: Signs/Symptoms:AMS.   COMPARISON: MRI 08/2000   ACCESSION NUMBER(S): [id number]   ORDERING CLINICIAN: YOSEF DUNLAP   TECHNIQUE: Noncontrast axial CT scan of head was performed. Angled reformats in brain and bone windows were generated. The images were reviewed in bone, brain, blood and soft tissue  windows.   FINDINGS: Global volume loss. Lacunar infarcts seen in the left basal ganglia again seen. Patchy periventricular low attenuation compatible with advanced chronic small vessel ischemic change. Degree is advanced for age. If concern for subtle acute ischemic changes, consider MRI. No hydrocephalus. Vascular calcification.         Similar appearance with advanced chronic small vessel ischemic change and remote left basal ganglia lacunar infarct. If concern for new ischemic changes, consider MRI. Degree of volume loss vascular changes does appear to be advanced for age.   No evidence of intracranial hemorrhage or displaced skull fracture.   MACRO: None   Signed by: Renny Cast 2/13/2024 9:08 PM Dictation workstation:   [id number]    No MRI head results found for the past 14 days   No results found for this or any previous visit.      Assessment/Plan   Principal Problem:    Altered mental status, unspecified altered mental status type  Active Problems:    Confusion    I spent 80 minutes with the patient and her sister.      Art Dumont Jr., M.D., [facility]

## 2024-02-14 NOTE — PROGRESS NOTES
Pharmacy Medication History Review    Heather Romo is a 73 y.o. female admitted for Altered mental status, unspecified altered mental status type. Pharmacy reviewed the patient's [id number] medications and allergies for accuracy.    The list below reflectives the updated PTA list. Please review each medication in order reconciliation for additional clarification and justification.       The list below reflectives the updated allergy list. Please review each documented allergy for additional clarification and justification.  Allergies  Reviewed by Carley Jewell RN on 2/14/2024   No Known Allergies         Below are additional concerns with the patient's PTA list.  Prior to Admission Medications   Prescriptions Last Dose Informant Patient Reported? Taking?   atorvastatin (Lipitor) 80 mg tablet   Yes No   Sig: Take 1 tablet (80 mg) by mouth once daily at bedtime.   fenofibrate (Tricor) 145 mg tablet   Yes No   Sig: Take 1 tablet (145 mg) by mouth once daily.   hydroCHLOROthiazide (HYDRODiuril) 25 mg tablet   No No   Sig: TAKE 1 TABLET BY MOUTH DAILY   lisinopril 10 mg tablet   No No   Sig: TAKE 1 TABLET BY MOUTH EVERY DAY      Facility-Administered Medications: None    Per patient's sister    Yolanda Dennis CPhT

## 2024-02-14 NOTE — H&P
History Of Present Illness  Heather Romo is a 73 y.o. female presenting with altered mental status and confusion.  This patient has a history of a remote TIA about 10 years ago, hypertension, hyperlipidemia, hyperglycemia, CKD and Charcot-Rylie-Tooth syndrome with foot drop.  History is provided by the patient but also by her sister.  Patient lives alone in her own home.  The sister states that 8 days ago the patient was not answering her phone which is unusual for her.  She went over and the patient seemed okay but had trouble finishing her sentences and finding the right word at the end of the sentence.  She was mildly agitated as well.  After that she was fine on recheck and 4 days ago she seemed okay when her sister saw her.  Today the patient's sister stated that the patient again was not answering her phone.  She went over and the house was dark and the patient did not come to the door to let her in.  She called the police and eventually the patient did come to the door and let them in but seemed confused at the time.  The patient had told them that she had been talking to her mother and her first , but they are .  Currently the patient states she does not remember that but she does remember feeling confused earlier.  She has no complaints at this time.  Apparently the patient ran out of her medications in October and has not seen her primary care physician for about 10 months.  The nurses reported to the patient's sister that the patient had urine and feces on her.  Apparently there was some squalor or poor living conditions in the home as well.    In the emergency department patient had a CAT scan of the head that showed a remote lacunar infarct, advanced chronic small vessel ischemic changes, but no acute process.  Chest x-ray also had no acute process  EKG had sinus bradycardia with heart rate of 56 but no ischemic changes  Her labs revealed a metabolic panel within normal limits other  than a creatinine of 1.09 which is stable with previous studies, GFR is 54, and the albumin was slightly low at 3.3.  Patient's CK, magnesium, lactate were within normal limits.  Troponin was 31 and 31.  CBC had a normal white count and no left shift, normal platelets.  She had mild anemia with a hemoglobin 10.6 and hematocrit of 33.1.  Urinalysis is ordered but not collected yet.    Past medical history: TIA about 10 years ago, hypertension, hyperlipidemia, hyperglycemia, CKD, Charcot Rylie tooth syndrome, bilateral foot drop.    Medications: Per the medical records the patient has been on atorvastatin in the past.  In the records there was a Tricor prescription but that was from 2017, and had also been on hydrochlorothiazide and lisinopril in October 2023.  Patient states she ran out of her medications in October and has not been taking them.  She did not know the names of her medications.    Allergies: No known drug allergies    Social history: Denies tobacco or alcohol use    Past Medical History:   Diagnosis Date    Personal history of transient ischemic attack (TIA), and cerebral infarction without residual deficits 08/07/2014    History of transient cerebral ischemia     Past Surgical History:   Procedure Laterality Date    HAND SURGERY  08/07/2014    Hand Surgery                                                                                                                                                          MR HEAD ANGIO WO IV CONTRAST  7/10/2014    MR HEAD ANGIO WO IV CONTRAST 7/10/2014 [facility]    MR HEAD ANGIO WO IV CONTRAST  8/19/2015    MR HEAD ANGIO WO IV CONTRAST 8/19/2015 [facility]    MR NECK ANGIO WO IV CONTRAST  7/10/2014    MR NECK ANGIO WO IV CONTRAST 7/10/2014 [facility]    MR NECK ANGIO WO IV CONTRAST  8/19/2015    MR NECK ANGIO WO IV CONTRAST 8/19/2015 [facility]           Family History  No family history on file.     Allergies  Patient has no  known allergies.    Review of Systems  Patient denies chest pain, shortness of breath, nausea, vomiting, fever, chills, diarrhea, constipation,  vision changes, rashes, dysuria, paresthesias, vertigo, headache, cough or cold symptoms, or any other complaints at this time. A complete review of systems was done, and is as stated in the history of present illness, is otherwise negative or not pertinent to the complaint.    Physical Exam  Physical exam: Vital signs and nurses notes were reviewed.    General:  no acute distress. Alert and oriented to person and to her family members.  She says she was 74 years old but she is only 73 years old.  She knew her birthday but she was not oriented to the current month.  She does know the year.  Patient does not know why she is here although she does admit to being confused earlier.    Head: atraumatic and normocephalic    Eyes: Pupils equal round reactive to light, EOMs are intact, conjunctivae is not injected.    Oropharynx: No erythema or exudate noted, no trismus or drooling, buccal mucosa is moist.    Ears:  normal external exam, no swelling or erythema,     Nasal: normal external exam,     Neck: Supple, full range of motion,     Cardiac: Regular rate and rhythm. No murmurs noted.     Pulmonary: Lungs clear bilaterally with good aeration. No adventitious breath sounds. No wheezes rales or rhonchi. No accessory muscle use no retraction noted.    Abdomen: Soft,  Nontender. No guarding, rigidity, or distention. Normoactive bowel sounds. No pulsatile masses, no bruits.     Extremities: Patient has braces on the lower legs ankle etc. bilaterally from her dropfoot.  She otherwise has full range of motion of the extremities and there is no edema.    Skin: No rash seen. Skin is warm and dry     Neuro: Patient is alert and oriented to person and family and to her birthdate but she says she was 74 when she is only 73 years old.  She was not oriented to the month but she did know the  year.  She did not appear to know where she was right now.  She did not recall the previous events but admitted to being a little confused earlier.  When asked about talking to her mother and her first  who are , the patient states she does not remember them but she remembered her niece.  She does seem slightly confused at this time.. Speech is clear. There is no asymmetry with facial grimaces, and no tongue deviation. Patient moves all extremities independently.  She had a normal finger-to-nose bilaterally.  Sensation is intact.      Last Recorded Vitals  /63   Pulse 58   Temp 36.4 °C (97.6 °F) (Temporal)   Resp 18   Wt 74 kg (163 lb 1.6 oz)   SpO2 98%     Relevant Results  Scheduled medications    Continuous medications    PRN medications      Results for orders placed or performed during the hospital encounter of 24 (from the past 24 hour(s))   Comprehensive metabolic panel   Result Value Ref Range    Glucose 103 (H) 74 - 99 mg/dL    Sodium 139 136 - 145 mmol/L    Potassium 4.2 3.5 - 5.3 mmol/L    Chloride 106 98 - 107 mmol/L    Bicarbonate 25 21 - 32 mmol/L    Anion Gap 12 10 - 20 mmol/L    Urea Nitrogen 20 6 - 23 mg/dL    Creatinine 1.09 (H) 0.50 - 1.05 mg/dL    eGFR 54 (L) >60 mL/min/1.73m*2    Calcium 8.8 8.6 - 10.3 mg/dL    Albumin 3.3 (L) 3.4 - 5.0 g/dL    Alkaline Phosphatase 80 33 - 136 U/L    Total Protein 6.6 6.4 - 8.2 g/dL    AST 35 9 - 39 U/L    Bilirubin, Total 0.6 0.0 - 1.2 mg/dL    ALT 20 7 - 45 U/L   Magnesium   Result Value Ref Range    Magnesium 1.90 1.60 - 2.40 mg/dL   Troponin I, High Sensitivity   Result Value Ref Range    Troponin I, High Sensitivity 31 (H) 0 - 13 ng/L   Creatine Kinase   Result Value Ref Range    Creatine Kinase 213 0 - 215 U/L   Lactate   Result Value Ref Range    Lactate 0.5 0.4 - 2.0 mmol/L   CBC and Auto Differential   Result Value Ref Range    WBC 6.9 4.4 - 11.3 x10*3/uL    nRBC 0.0 0.0 - 0.0 /100 WBCs    RBC 3.42 (L) 4.00 - 5.20  x10*6/uL    Hemoglobin 10.6 (L) 12.0 - 16.0 g/dL    Hematocrit 33.1 (L) 36.0 - 46.0 %    MCV 97 80 - 100 fL    MCH 31.0 26.0 - 34.0 pg    MCHC 32.0 32.0 - 36.0 g/dL    RDW 13.2 11.5 - 14.5 %    Platelets 251 150 - 450 x10*3/uL    Neutrophils % 74.9 40.0 - 80.0 %    Immature Granulocytes %, Automated 0.1 0.0 - 0.9 %    Lymphocytes % 16.5 13.0 - 44.0 %    Monocytes % 7.7 2.0 - 10.0 %    Eosinophils % 0.4 0.0 - 6.0 %    Basophils % 0.4 0.0 - 2.0 %    Neutrophils Absolute 5.12 1.60 - 5.50 x10*3/uL    Immature Granulocytes Absolute, Automated 0.01 0.00 - 0.50 x10*3/uL    Lymphocytes Absolute 1.13 0.80 - 3.00 x10*3/uL    Monocytes Absolute 0.53 0.05 - 0.80 x10*3/uL    Eosinophils Absolute 0.03 0.00 - 0.40 x10*3/uL    Basophils Absolute 0.03 0.00 - 0.10 x10*3/uL   Troponin I, High Sensitivity   Result Value Ref Range    Troponin I, High Sensitivity 31 (H) 0 - 13 ng/L     CT head wo IV contrast   Final Result   Similar appearance with advanced chronic small vessel ischemic change   and remote left basal ganglia lacunar infarct. If concern for new   ischemic changes, consider MRI. Degree of volume loss vascular   changes does appear to be advanced for age.        No evidence of intracranial hemorrhage or displaced skull fracture.        MACRO:   None        Signed by: Renny Cast 2/13/2024 9:08 PM   Dictation workstation:   [id number]      XR chest 1 view   Final Result   No airspace consolidation or pleural effusion.        MACRO:   None        Signed by: Kit Woods 2/13/2024 7:56 PM   Dictation workstation:   [id number]      MR brain wo IV contrast    (Results Pending)          Assessment/Plan   Principal Problem:    Altered mental status, unspecified altered mental status type  Active Problems:    Confusion      Plan: Neurology consult requested  MRI ordered  Repeat labs in the morning  Lipid panel added on  Telemetry  Lovenox prophylactically  PT, OT, and social work consults ordered.  I did order the  atorvastatin and her lisinopril but did not order the HCTZ secondary to mild CKD and the Tricor prescription was from 2017 so that also was not ordered.  Med reconciliation has not been completed and will need to be reviewed tomorrow.  Findings, orders, plan discussed with Dr. Figueroa.       Megan Burns PA-C

## 2024-02-14 NOTE — CARE PLAN
The patient's goals for the shift include      The clinical goals for the shift include  will remain free from falls by the end of shift

## 2024-02-14 NOTE — PROGRESS NOTES
Physical Therapy    Physical Therapy Evaluation    Patient Name: Heather Romo  MRN: [id number]  Today's Date: 2/14/2024   Time Calculation  Start Time: 1320  Stop Time: 1335  Time Calculation (min): 15 min    Assessment/Plan   PT Assessment  PT Assessment Results: Decreased strength, Decreased range of motion, Decreased endurance, Impaired balance, Decreased mobility, Orthopedic restrictions  Rehab Prognosis: Good  End of Session Communication: Bedside nurse  Assessment Comment: PT Evaluation Completed. The patient presented with decreased mobility, balance, endurance, safety awareness, and increased  fatigue. These impairments are negatively impacting her ability to perform at baseline functional level, in home environment. The patient is at risk of falls & injury. This patient would benefit from skilled therapy intervention to address limitations and progress towards the PT goals.  Anticipate moderate frequency PT needs at discharge  End of Session Patient Position: Up in chair, Alarm off, not on at start of session  IP OR SWING BED PT PLAN  Inpatient or Swing Bed: Inpatient  PT Plan  Treatment/Interventions: Bed mobility, Transfer training, Gait training, Stair training, Balance training, Strengthening, Range of motion, Endurance training, Therapeutic exercise, Therapeutic activity, Home exercise program  PT Plan: Skilled PT  PT Frequency: 3 times per week  PT Discharge Recommendations: Moderate intensity level of continued care  Equipment Recommended upon Discharge: Wheeled walker  PT Recommended Transfer Status: Assist x1  PT - OK to Discharge: Yes (PT POC established)      Subjective   General Visit Information:  General  Reason for Referral: AMS  Referred By: Katy  Past Medical History Relevant to Rehab:   Past Medical History:   Diagnosis Date    Personal history of transient ischemic attack (TIA), and cerebral infarction without residual deficits 08/07/2014    History of transient cerebral ischemia        Co-Treatment: OT  Co-Treatment Reason: To increase patient safety, transfer ability, and participation.  Prior to Session Communication: Bedside nurse  Patient Position Received:  (Sitting EOB, sister and sitter present)  General Comment: Pt confused but agreeable to eval. Sister assists with providing history as patient is an unreliable historian.  Home Living:  Home Living  Type of Home: House  Lives With: Alone  Home Adaptive Equipment: None  Home Layout: Two level, Bed/bath upstairs, Stairs to alternate level with rails  Alternate Level Stairs-Rails: Both  Alternate Level Stairs-Number of Steps: 13  Home Access: Stairs to enter without rails  Entrance Stairs-Number of Steps: 2 (Rail and first step up are broken per sister)  Prior Level of Function:  Prior Function Per Pt/Caregiver Report  Level of Bre[place]: Independent with ADLs and functional transfers  ADL Assistance: Independent  Homemaking Assistance:  (microwaves meals, does not clean regularly, completes laundry at a laundromat)  Ambulatory Assistance: Independent (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 1 recent fall. Descends the stairs backwards using surjit HRs.  Precautions:  Precautions  Medical Precautions: Fall precautions  Vital Signs:       Objective   Pain:  Pain Assessment  Pain Assessment: 0-10  Pain Score: 0 - No pain  Cognition:  Cognition  Overall Cognitive Status: Impaired  Orientation Level: Disoriented to place, Disoriented to time, Disoriented to situation  Attention: Exceptions to WFL  Memory: Exceptions to WFL  Problem Solving: Exceptions to WFL  Safety/Judgement: Exceptions to WFL  Insight: Severe  Impulsive: Moderately    General Assessments:  Activity Tolerance  Endurance: Tolerates 10 - 20 min exercise with multiple rests    Sensation  Light Touch:  (Numbness/tingling in hands and feet last night.)    Postural Control  Postural Control: Within Functional Limits    Static  Sitting Balance  Static Sitting-Balance Support: Feet supported, Bilateral upper extremity supported  Static Sitting-Level of Assistance: Close supervision  Dynamic Sitting Balance  Dynamic Sitting-Balance Support: Feet supported, Bilateral upper extremity supported  Dynamic Sitting-Comments: Close supervision    Static Standing Balance  Static Standing-Balance Support: Bilateral upper extremity supported  Static Standing-Level of Assistance: Contact guard  Dynamic Standing Balance  Dynamic Standing-Balance Support: Bilateral upper extremity supported  Dynamic Standing-Comments: Min assist  Functional Assessments:       Transfers  Transfer: Yes  Transfer 1  Technique 1: Sit to stand, Stand to sit  Transfer Device 1: Walker  Transfer Level of Assistance 1: Contact guard  Trials/Comments 1: Cues for hand placement. Multiple attempts to stand.    Ambulation/Gait Training  Ambulation/Gait Training Performed: Yes  Ambulation/Gait Training 1  Surface 1: Level tile  Device 1: Rolling walker  Assistance 1: Minimum assistance  Comments/Distance (ft) 1: Cues to step into walker. Pt impulsive. Requires occasional assist to maneuver walker.       Extremity/Trunk Assessments:  RUE   RUE : Within Functional Limits  LUE   LUE: Within Functional Limits  RLE   RLE :  (surjit ankle braces donned. Difficulty following commands for MMT. >3/5 based on observation of functional mobility.)  LLE   LLE :  (surjit ankle braces donned. Difficulty following commands for MMT. >3/5 based on observation of functional mobility.)  Outcome Measures:  [facility] Basic Mobility  Turning from your back to your side while in a flat bed without using bedrails: A little  Moving from lying on your back to sitting on the side of a flat bed without using bedrails: A little  Moving to and from bed to chair (including a wheelchair): A little  Standing up from a chair using your arms (e.g. wheelchair or bedside chair): A little  To walk in hospital room: A  little  Climbing 3-5 steps with railing: Total  Basic Mobility - Total Score: 16    Encounter Problems       Encounter Problems (Active)       Balance       complete all mobility with normal balance while dual tasking, negotiating in a dynamic environment, carrying items, etc., with proactive and reactive static and dynamic standing and sitting tasks, with mod I and a RW, >15 minutes.       Start:  02/14/24    Expected End:  02/28/24               Mobility       STG - Patient will ambulate 150 ft mod I using LRAD.        Start:  02/14/24    Expected End:  02/28/24            STG - Patient will ascend and descend a flight of stairs using surjit Hrs mod I.        Start:  02/14/24    Expected End:  02/28/24               Pain - Adult          Transfers       STG - Patient will perform bed mobility independently.        Start:  02/14/24    Expected End:  02/28/24            STG - Patient will transfer sit to and from stand mod I with LRAD.       Start:  02/14/24    Expected End:  02/28/24                   Education Documentation  Body Mechanics, taught by Anisha Woody, PT at 2/14/2024  2:10 PM.  Learner: Patient  Readiness: Acceptance  Method: Explanation  Response: Needs Reinforcement    Mobility Training, taught by Anisha Woody, PT at 2/14/2024  2:10 PM.  Learner: Patient  Readiness: Acceptance  Method: Explanation  Response: Needs Reinforcement    Education Comments  No comments found.

## 2024-02-14 NOTE — PROGRESS NOTES
Care Coordinator Note:  Plan: MRI brain d/t altered mental status  Disposition: PT/OT pending recommendations  SW following, IDT rounds with medical director asst, physician, nurse manager  Janki PEÑALOZA, RN T[facility]     15:00 Met patient sister at bedside to discuss discharge planning patient lives alone, Independent with ADL'S, no use for asst , Independent with don/doffing  surjit AFO, last seen PCP 6/2023 goal is to move into an apartment or asst living, after snf placement, will provide resources for asst living information. My chart link update sent to patient sister cell phone ( Renae)  per permission of patient. New rec MOD from PT snf choices provided

## 2024-02-14 NOTE — PROGRESS NOTES
Spoke at length with patient's sister Renae. She is very involved and supportive of patient. She and her brother provided assistance as allowed by patient. Renae describes patient as being very independent, lives alone and able to perform all daily living activities. Patient was still driving and transacting personal  business and Renae had no concerns before yesterday. Patient does live on fixed income and family helps as needed. Renae and her brother Raymond are talking with patient regarding downsizing and she seems open to discussing. Awaiting PT/OT eval and MOCO. 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 She may be at her baseline however if not and snf recommended she thinks patient will be agreeable. Referral has been made to [facility] by [facility]. I followed up and left message for SW dept as Renae is open to working with them. SW to follow.

## 2024-02-14 NOTE — PROGRESS NOTES
Transfer of care note:    I received this patient in signout -   ED Course as of 02/13/24 2054 Tue Feb 13, 2024 1824 ECG read interpreted by me.  Sinus bradycardia, rate 56.  Normal axis.  Normal intervals.  No ST or T wave derangements. [CG]   1923 Labs notable for CBC without leukocytosis, mild anemia 10.6, normal platelets, CMP with mildly elevated creatinine 1.09, normal LFTs, mildly elevated troponin 31, normal CK, normal lactate. [CG]   1939 Patient is pending urinalysis, CT head, and chest x-ray at time of handoff to Dr. Adams. She will need admission for further workup.  [CG]   2004 CXR negative.  [CG]   2054 Patient care signed to me pending CT head and plan for admission.  Altered mentation and poor living conditions. [JM]      ED Course User Index  [CG] Mai Romo MD  [JM] Leo Adams MD         Diagnoses as of 02/13/24 2054   Altered mental status, unspecified altered mental status type     CT head without acute findings.  Patient care signed out to hospitalist service for continued management.  UA pending.  Patient did require 2.5 mg IM Zyprexa per protocol for agitation in the ED.

## 2024-02-14 NOTE — PROGRESS NOTES
Heather Romo is a 73 y.o. female on day 1 of admission presenting with Altered mental status, unspecified altered mental status type.      Subjective   Pt seen and examined.        Objective     Last Recorded Vitals  /65 (BP Location: Right arm, Patient Position: Sitting)   Pulse 60   Temp 36.3 °C (97.3 °F) (Temporal)   Resp 19   Wt 74 kg (163 lb 1.6 oz)   SpO2 99%   Intake/Output last 3 Shifts:    Intake/Output Summary (Last 24 hours) at 2/14/2024 1245  Last data filed at 2/14/2024 1134  Gross per 24 hour   Intake --   Output 100 ml   Net -100 ml       Admission Weight  Weight: 74 kg (163 lb 1.6 oz) (02/13/24 1617)    Daily Weight  02/13/24 : 74 kg (163 lb 1.6 oz)      Physical Exam    Constitutional: No acute distress, awake, alert  Head/Neck: Neck supple  Respiratory/Thorax: Lungs are Clear to auscultation  Cardiovascular: Regular, rate and rhythm,  2+ equal pulses of the extremities, normal S 1and S 2  Gastrointestinal: Nondistended, soft, non-tender, no rebound tenderness or guarding  Extremities: Braces on lower extremities  Neurological: Awake and alert.  confused, no focal neuro deficits  Psychological: Appropriate mood and behavior    Relevant Results  Results for orders placed or performed during the hospital encounter of 02/13/24 (from the past 24 hour(s))   ECG 12 lead   Result Value Ref Range    Ventricular Rate 56 BPM    Atrial Rate 56 BPM    WA Interval 174 ms    QRS Duration 78 ms    QT Interval 444 ms    QTC Calculation(Bazett) 428 ms    P Axis 32 degrees    R Axis -2 degrees    T Axis 28 degrees    QRS Count 9 beats    Q Onset 215 ms    P Onset 128 ms    P Offset 179 ms    T Offset 437 ms    QTC Fredericia 434 ms   Comprehensive metabolic panel   Result Value Ref Range    Glucose 103 (H) 74 - 99 mg/dL    Sodium 139 136 - 145 mmol/L    Potassium 4.2 3.5 - 5.3 mmol/L    Chloride 106 98 - 107 mmol/L    Bicarbonate 25 21 - 32 mmol/L    Anion Gap 12 10 - 20 mmol/L    Urea Nitrogen 20 6 -  23 mg/dL    Creatinine 1.09 (H) 0.50 - 1.05 mg/dL    eGFR 54 (L) >60 mL/min/1.73m*2    Calcium 8.8 8.6 - 10.3 mg/dL    Albumin 3.3 (L) 3.4 - 5.0 g/dL    Alkaline Phosphatase 80 33 - 136 U/L    Total Protein 6.6 6.4 - 8.2 g/dL    AST 35 9 - 39 U/L    Bilirubin, Total 0.6 0.0 - 1.2 mg/dL    ALT 20 7 - 45 U/L   Magnesium   Result Value Ref Range    Magnesium 1.90 1.60 - 2.40 mg/dL   Troponin I, High Sensitivity   Result Value Ref Range    Troponin I, High Sensitivity 31 (H) 0 - 13 ng/L   Creatine Kinase   Result Value Ref Range    Creatine Kinase 213 0 - 215 U/L   Lactate   Result Value Ref Range    Lactate 0.5 0.4 - 2.0 mmol/L   CBC and Auto Differential   Result Value Ref Range    WBC 6.9 4.4 - 11.3 x10*3/uL    nRBC 0.0 0.0 - 0.0 /100 WBCs    RBC 3.42 (L) 4.00 - 5.20 x10*6/uL    Hemoglobin 10.6 (L) 12.0 - 16.0 g/dL    Hematocrit 33.1 (L) 36.0 - 46.0 %    MCV 97 80 - 100 fL    MCH 31.0 26.0 - 34.0 pg    MCHC 32.0 32.0 - 36.0 g/dL    RDW 13.2 11.5 - 14.5 %    Platelets 251 150 - 450 x10*3/uL    Neutrophils % 74.9 40.0 - 80.0 %    Immature Granulocytes %, Automated 0.1 0.0 - 0.9 %    Lymphocytes % 16.5 13.0 - 44.0 %    Monocytes % 7.7 2.0 - 10.0 %    Eosinophils % 0.4 0.0 - 6.0 %    Basophils % 0.4 0.0 - 2.0 %    Neutrophils Absolute 5.12 1.60 - 5.50 x10*3/uL    Immature Granulocytes Absolute, Automated 0.01 0.00 - 0.50 x10*3/uL    Lymphocytes Absolute 1.13 0.80 - 3.00 x10*3/uL    Monocytes Absolute 0.53 0.05 - 0.80 x10*3/uL    Eosinophils Absolute 0.03 0.00 - 0.40 x10*3/uL    Basophils Absolute 0.03 0.00 - 0.10 x10*3/uL   Troponin I, High Sensitivity   Result Value Ref Range    Troponin I, High Sensitivity 31 (H) 0 - 13 ng/L   Lipid panel   Result Value Ref Range    Cholesterol 260 (H) 0 - 199 mg/dL    HDL-Cholesterol 46.5 mg/dL    Cholesterol/HDL Ratio 5.6     LDL Calculated 196 (H) <=99 mg/dL    VLDL 18 0 - 40 mg/dL    Triglycerides 90 0 - 149 mg/dL    Non HDL Cholesterol 214 (H) 0 - 149 mg/dL   ECG 12 lead   Result  Value Ref Range    Ventricular Rate 67 BPM    Atrial Rate 67 BPM    CO Interval 158 ms    QRS Duration 78 ms    QT Interval 424 ms    QTC Calculation(Bazett) 448 ms    P Axis 50 degrees    R Axis 10 degrees    T Axis 39 degrees    QRS Count 10 beats    Q Onset 217 ms    P Onset 138 ms    P Offset 181 ms    T Offset 429 ms    QTC Fredericia 440 ms   Troponin I, High Sensitivity   Result Value Ref Range    Troponin I, High Sensitivity 35 (H) 0 - 13 ng/L   CBC   Result Value Ref Range    WBC 7.5 4.4 - 11.3 x10*3/uL    nRBC 0.0 0.0 - 0.0 /100 WBCs    RBC 3.63 (L) 4.00 - 5.20 x10*6/uL    Hemoglobin 11.3 (L) 12.0 - 16.0 g/dL    Hematocrit 36.8 36.0 - 46.0 %     (H) 80 - 100 fL    MCH 31.1 26.0 - 34.0 pg    MCHC 30.7 (L) 32.0 - 36.0 g/dL    RDW 13.4 11.5 - 14.5 %    Platelets 259 150 - 450 x10*3/uL   Basic metabolic panel   Result Value Ref Range    Glucose 76 74 - 99 mg/dL    Sodium 144 136 - 145 mmol/L    Potassium 3.4 (L) 3.5 - 5.3 mmol/L    Chloride 109 (H) 98 - 107 mmol/L    Bicarbonate 24 21 - 32 mmol/L    Anion Gap 14 10 - 20 mmol/L    Urea Nitrogen 19 6 - 23 mg/dL    Creatinine 1.02 0.50 - 1.05 mg/dL    eGFR 58 (L) >60 mL/min/1.73m*2    Calcium 8.9 8.6 - 10.3 mg/dL        CT head wo IV contrast   Final Result   Similar appearance with advanced chronic small vessel ischemic change   and remote left basal ganglia lacunar infarct. If concern for new   ischemic changes, consider MRI. Degree of volume loss vascular   changes does appear to be advanced for age.        No evidence of intracranial hemorrhage or displaced skull fracture.        MACRO:   None        Signed by: Renny Cast 2/13/2024 9:08 PM   Dictation workstation:   [id number]      XR chest 1 view   Final Result   No airspace consolidation or pleural effusion.        MACRO:   None        Signed by: Kit Woods 2/13/2024 7:56 PM   Dictation workstation:   [id number]      MR brain wo IV contrast    (Results Pending)       Scheduled  medications  atorvastatin, 80 mg, oral, Nightly  enoxaparin, 40 mg, subcutaneous, q24h  lisinopril, 10 mg, oral, Daily  melatonin, 3 mg, oral, Daily  sennosides, 2 tablet, oral, BID      Continuous medications     PRN medications  PRN medications: acetaminophen **OR** acetaminophen **OR** acetaminophen, OLANZapine, ondansetron ODT **OR** ondansetron         Assessment/Plan                  Principal Problem:    Altered mental status, unspecified altered mental status type  Active Problems:    Confusion    # Altered mental status  -no clear etiology  -MRI brain ordered  -neurology consult  -PT/OT  -monitor    # HTN  -continue home meds    # HLD  -continue statin    # DVT ppx              Celsa Leyva MD

## 2024-02-15 ENCOUNTER — APPOINTMENT (OUTPATIENT)
Dept: RADIOLOGY | Facility: HOSPITAL | Age: 74
DRG: 056 | End: 2024-02-15
Payer: MEDICARE

## 2024-02-15 LAB
ANION GAP SERPL CALC-SCNC: 14 MMOL/L (ref 10–20)
BACTERIA UR CULT: NORMAL
BUN SERPL-MCNC: 23 MG/DL (ref 6–23)
CALCIUM SERPL-MCNC: 9.4 MG/DL (ref 8.6–10.3)
CHLORIDE SERPL-SCNC: 109 MMOL/L (ref 98–107)
CO2 SERPL-SCNC: 23 MMOL/L (ref 21–32)
CREAT SERPL-MCNC: 1.19 MG/DL (ref 0.5–1.05)
EGFRCR SERPLBLD CKD-EPI 2021: 48 ML/MIN/1.73M*2
ERYTHROCYTE [DISTWIDTH] IN BLOOD BY AUTOMATED COUNT: 13.7 % (ref 11.5–14.5)
FOLATE SERPL-MCNC: >24 NG/ML
GLUCOSE SERPL-MCNC: 105 MG/DL (ref 74–99)
HCT VFR BLD AUTO: 39.4 % (ref 36–46)
HGB BLD-MCNC: 11.9 G/DL (ref 12–16)
MCH RBC QN AUTO: 31.1 PG (ref 26–34)
MCHC RBC AUTO-ENTMCNC: 30.2 G/DL (ref 32–36)
MCV RBC AUTO: 103 FL (ref 80–100)
NRBC BLD-RTO: 0 /100 WBCS (ref 0–0)
PLATELET # BLD AUTO: 270 X10*3/UL (ref 150–450)
POTASSIUM SERPL-SCNC: 4.1 MMOL/L (ref 3.5–5.3)
RBC # BLD AUTO: 3.83 X10*6/UL (ref 4–5.2)
SODIUM SERPL-SCNC: 142 MMOL/L (ref 136–145)
TREPONEMA PALLIDUM IGG+IGM AB [PRESENCE] IN SERUM OR PLASMA BY IMMUNOASSAY: NONREACTIVE
TSH SERPL-ACNC: 1.16 MIU/L (ref 0.44–3.98)
VIT B12 SERPL-MCNC: 1351 PG/ML (ref 211–911)
WBC # BLD AUTO: 7.4 X10*3/UL (ref 4.4–11.3)

## 2024-02-15 PROCEDURE — 80048 BASIC METABOLIC PNL TOTAL CA: CPT | Performed by: PHYSICIAN ASSISTANT

## 2024-02-15 PROCEDURE — 85027 COMPLETE CBC AUTOMATED: CPT | Performed by: PHYSICIAN ASSISTANT

## 2024-02-15 PROCEDURE — 99232 SBSQ HOSP IP/OBS MODERATE 35: CPT | Performed by: PSYCHIATRY & NEUROLOGY

## 2024-02-15 PROCEDURE — 82746 ASSAY OF FOLIC ACID SERUM: CPT | Mod: AHULAB | Performed by: INTERNAL MEDICINE

## 2024-02-15 PROCEDURE — 1200000002 HC GENERAL ROOM WITH TELEMETRY DAILY

## 2024-02-15 PROCEDURE — 2500000004 HC RX 250 GENERAL PHARMACY W/ HCPCS (ALT 636 FOR OP/ED): Performed by: EMERGENCY MEDICINE

## 2024-02-15 PROCEDURE — 36415 COLL VENOUS BLD VENIPUNCTURE: CPT | Performed by: PHYSICIAN ASSISTANT

## 2024-02-15 PROCEDURE — 82607 VITAMIN B-12: CPT | Mod: AHULAB | Performed by: PSYCHIATRY & NEUROLOGY

## 2024-02-15 PROCEDURE — 84443 ASSAY THYROID STIM HORMONE: CPT | Performed by: PSYCHIATRY & NEUROLOGY

## 2024-02-15 PROCEDURE — 86780 TREPONEMA PALLIDUM: CPT | Mod: AHULAB | Performed by: PSYCHIATRY & NEUROLOGY

## 2024-02-15 PROCEDURE — 99232 SBSQ HOSP IP/OBS MODERATE 35: CPT | Performed by: INTERNAL MEDICINE

## 2024-02-15 PROCEDURE — 2500000001 HC RX 250 WO HCPCS SELF ADMINISTERED DRUGS (ALT 637 FOR MEDICARE OP): Performed by: PHYSICIAN ASSISTANT

## 2024-02-15 PROCEDURE — 2500000004 HC RX 250 GENERAL PHARMACY W/ HCPCS (ALT 636 FOR OP/ED): Performed by: INTERNAL MEDICINE

## 2024-02-15 PROCEDURE — G0378 HOSPITAL OBSERVATION PER HR: HCPCS

## 2024-02-15 RX ORDER — CEFTRIAXONE 1 G/50ML
1 INJECTION, SOLUTION INTRAVENOUS EVERY 24 HOURS
Status: DISCONTINUED | OUTPATIENT
Start: 2024-02-15 | End: 2024-02-16

## 2024-02-15 RX ORDER — HALOPERIDOL 5 MG/ML
2 INJECTION INTRAMUSCULAR ONCE
Status: COMPLETED | OUTPATIENT
Start: 2024-02-15 | End: 2024-02-15

## 2024-02-15 RX ADMIN — LISINOPRIL 10 MG: 10 TABLET ORAL at 08:03

## 2024-02-15 RX ADMIN — OLANZAPINE 2.5 MG: 10 INJECTION, POWDER, FOR SOLUTION INTRAMUSCULAR at 22:44

## 2024-02-15 RX ADMIN — CEFTRIAXONE SODIUM 1 G: 1 INJECTION, SOLUTION INTRAVENOUS at 14:02

## 2024-02-15 RX ADMIN — OLANZAPINE 2.5 MG: 10 INJECTION, POWDER, FOR SOLUTION INTRAMUSCULAR at 09:57

## 2024-02-15 RX ADMIN — HALOPERIDOL LACTATE 2 MG: 5 INJECTION, SOLUTION INTRAMUSCULAR at 11:34

## 2024-02-15 RX ADMIN — STANDARDIZED SENNA CONCENTRATE 17.2 MG: 8.6 TABLET ORAL at 08:03

## 2024-02-15 ASSESSMENT — COGNITIVE AND FUNCTIONAL STATUS - GENERAL
TURNING FROM BACK TO SIDE WHILE IN FLAT BAD: A LITTLE
PERSONAL GROOMING: A LOT
MOVING FROM LYING ON BACK TO SITTING ON SIDE OF FLAT BED WITH BEDRAILS: A LITTLE
HELP NEEDED FOR BATHING: A LOT
EATING MEALS: A LOT
WALKING IN HOSPITAL ROOM: A LOT
DRESSING REGULAR LOWER BODY CLOTHING: A LOT
DRESSING REGULAR UPPER BODY CLOTHING: A LOT
TURNING FROM BACK TO SIDE WHILE IN FLAT BAD: A LITTLE
DAILY ACTIVITIY SCORE: 12
MOVING FROM LYING ON BACK TO SITTING ON SIDE OF FLAT BED WITH BEDRAILS: A LITTLE
DRESSING REGULAR UPPER BODY CLOTHING: A LOT
STANDING UP FROM CHAIR USING ARMS: A LITTLE
CLIMB 3 TO 5 STEPS WITH RAILING: A LOT
HELP NEEDED FOR BATHING: A LOT
CLIMB 3 TO 5 STEPS WITH RAILING: A LOT
MOVING TO AND FROM BED TO CHAIR: A LITTLE
MOBILITY SCORE: 16
EATING MEALS: A LOT
PERSONAL GROOMING: A LOT
TOILETING: A LOT
STANDING UP FROM CHAIR USING ARMS: A LOT
MOBILITY SCORE: 14
TOILETING: A LOT
DAILY ACTIVITIY SCORE: 12
DRESSING REGULAR LOWER BODY CLOTHING: A LOT
MOVING TO AND FROM BED TO CHAIR: A LOT
WALKING IN HOSPITAL ROOM: A LOT

## 2024-02-15 ASSESSMENT — PAIN SCALES - GENERAL
PAINLEVEL_OUTOF10: 0 - NO PAIN
PAINLEVEL_OUTOF10: 0 - NO PAIN

## 2024-02-15 ASSESSMENT — PAIN - FUNCTIONAL ASSESSMENT
PAIN_FUNCTIONAL_ASSESSMENT: 0-10
PAIN_FUNCTIONAL_ASSESSMENT: 0-10

## 2024-02-15 NOTE — PROGRESS NOTES
2/15/2024 3:53 PM I spoke with patient's sister. She agrees to send referrals to [facility] and [facility]. Scarlet LEMUS

## 2024-02-15 NOTE — PROGRESS NOTES
"NEUROLOGY FOLLOW-UP NOTE    IMPRESSION:  Presentation suggests dementia of Alzheimer's type.  I cannot absolutely rule out an acute intracranial process.  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     RECOMMENDATIONS:  B12, syphilis screen, cranial MRI pending. Will likely need placement.    Art Dumont Jr., M.D., FAAN     Heather Romo is a 73 y.o. female on day 1 of admission presenting with altered mentation      Subjective   No change in mentation.  She denies new focal neurological symptoms including dysarthria, dysphagia, diplopia, focal weakness, focal sensory change, ataxia, vertigo, or bowel/bladder incontinence, among others.         Scheduled medications  atorvastatin, 80 mg, oral, Nightly  cefTRIAXone, 1 g, intravenous, q24h  enoxaparin, 40 mg, subcutaneous, q24h  lisinopril, 10 mg, oral, Daily  melatonin, 3 mg, oral, Daily  sennosides, 2 tablet, oral, BID      Continuous medications     PRN medications  PRN medications: acetaminophen **OR** acetaminophen **OR** acetaminophen, OLANZapine     Objective     Last Recorded Vitals  Blood pressure 131/60, pulse 73, temperature 36.4 °C (97.5 °F), temperature source Temporal, resp. rate 16, height 1.6 m (5' 3\"), weight 74 kg (163 lb 1.6 oz), SpO2 95 %.    NEUROLOGICAL EXAM:    CONSTITUTIONAL:  No acute distress     CARDIOVASCULAR:  Normal pulses in the distal legs, no edema of either arm or either leg.  No carotid bruits.     MENTAL STATUS:  Awake, alert, oriented to self, but not to place or time, with poor short-term memory (0/3 5-minute recall), poor awareness of recent events, reduced attention span, concentration, and fund of knowledge. MMSE is 13/30 with deficits of 3/5 orientation to place, 1/5 orientation to time, 0/5 attention, 2/3 immediate recall, 0/1 drawing, 0/1 writing, 0/3 5-minute recall.     SPEECH AND LANGUAGE:  Can name and repeat, follows all commands, has no dysarthria     FUNDOSCOPIC:  No papilledema     CRANIAL " NERVES:  II-Vision present, visual fields full to confrontational testing     III/IV/VI--EOMs are present in all directions.  Pupils are symmetrically reactive in dim light.  No ptosis.     V--Normal facial sensation.     VII--No facial asymmetry.     VIII--Hearing present to voice bilaterally.     IX/X--Symmetric soft palate rise.     XI--Normal trapezius power bilaterally.     XII--Tongue protrudes without deviation.     MOTOR:  Atrophy the thenar eminence bilaterally, and of the interossei of both hands.  0/5 abductor pollicis brevis, 3/5 interossei bilaterally.  Finger flexion and extension 4/5.  Normal power of biceps, triceps, deltoids.  0/5 foot inversion and eversion bilaterally, 0/5 toe flexion and extension with presence of hammer toes, 0/5 foot plantarflexion and dorsiflexion.  Normal quads, hams, iliacus.     SENSORY:  Reduced pin sensation in a stocking distribution from the feet to the knees, and from the fingers to the elbows, without asymmetry or spinal sensory level.     COORDINATION:  Normal finger-to-nose and heel-to-shin testing in both arms and both legs.     REFLEXES are symmetrically absent at the biceps, triceps, brachioradialis, patella, and ankle.  The plantar responses are equivocal bilaterally.    GAIT deferred    Relevant Results  Results for orders placed or performed during the hospital encounter of 02/13/24 (from the past 24 hour(s))   TSH   Result Value Ref Range    Thyroid Stimulating Hormone 1.16 0.44 - 3.98 mIU/L   CBC   Result Value Ref Range    WBC 7.4 4.4 - 11.3 x10*3/uL    nRBC 0.0 0.0 - 0.0 /100 WBCs    RBC 3.83 (L) 4.00 - 5.20 x10*6/uL    Hemoglobin 11.9 (L) 12.0 - 16.0 g/dL    Hematocrit 39.4 36.0 - 46.0 %     (H) 80 - 100 fL    MCH 31.1 26.0 - 34.0 pg    MCHC 30.2 (L) 32.0 - 36.0 g/dL    RDW 13.7 11.5 - 14.5 %    Platelets 270 150 - 450 x10*3/uL   Basic metabolic panel   Result Value Ref Range    Glucose 105 (H) 74 - 99 mg/dL    Sodium 142 136 - 145 mmol/L     Potassium 4.1 3.5 - 5.3 mmol/L    Chloride 109 (H) 98 - 107 mmol/L    Bicarbonate 23 21 - 32 mmol/L    Anion Gap 14 10 - 20 mmol/L    Urea Nitrogen 23 6 - 23 mg/dL    Creatinine 1.19 (H) 0.50 - 1.05 mg/dL    eGFR 48 (L) >60 mL/min/1.73m*2    Calcium 9.4 8.6 - 10.3 mg/dL           Art Dumont Jr., M.D., FAAN

## 2024-02-15 NOTE — CARE PLAN
The patient's goals for the shift include      The clinical goals for the shift include Patient will remain free from falls by the end of shift

## 2024-02-15 NOTE — PROGRESS NOTES
Heather Romo is a 73 y.o. female on day 1 of admission presenting with Altered mental status, unspecified altered mental status type.      Subjective   Pt seen and examined.        Objective     Last Recorded Vitals  /60 (BP Location: Right arm, Patient Position: Sitting)   Pulse 73   Temp 36.4 °C (97.5 °F) (Temporal)   Resp 16   Wt 74 kg (163 lb 1.6 oz)   SpO2 95%   Intake/Output last 3 Shifts:    Intake/Output Summary (Last 24 hours) at 2/15/2024 1241  Last data filed at 2/15/2024 0900  Gross per 24 hour   Intake 300 ml   Output --   Net 300 ml         Admission Weight  Weight: 74 kg (163 lb 1.6 oz) (02/13/24 1617)    Daily Weight  02/13/24 : 74 kg (163 lb 1.6 oz)      Physical Exam    Constitutional: No acute distress, awake, alert  Head/Neck: Neck supple  Respiratory/Thorax: Lungs are Clear to auscultation  Cardiovascular: Regular, rate and rhythm,  2+ equal pulses of the extremities, normal S 1and S 2  Gastrointestinal: Nondistended, soft, non-tender, no rebound tenderness or guarding  Extremities: Braces on lower extremities  Neurological: Awake and alert.  confused, no focal neuro deficits  Psychological: Appropriate mood and behavior    Relevant Results  Results for orders placed or performed during the hospital encounter of 02/13/24 (from the past 24 hour(s))   TSH   Result Value Ref Range    Thyroid Stimulating Hormone 1.16 0.44 - 3.98 mIU/L   CBC   Result Value Ref Range    WBC 7.4 4.4 - 11.3 x10*3/uL    nRBC 0.0 0.0 - 0.0 /100 WBCs    RBC 3.83 (L) 4.00 - 5.20 x10*6/uL    Hemoglobin 11.9 (L) 12.0 - 16.0 g/dL    Hematocrit 39.4 36.0 - 46.0 %     (H) 80 - 100 fL    MCH 31.1 26.0 - 34.0 pg    MCHC 30.2 (L) 32.0 - 36.0 g/dL    RDW 13.7 11.5 - 14.5 %    Platelets 270 150 - 450 x10*3/uL   Basic metabolic panel   Result Value Ref Range    Glucose 105 (H) 74 - 99 mg/dL    Sodium 142 136 - 145 mmol/L    Potassium 4.1 3.5 - 5.3 mmol/L    Chloride 109 (H) 98 - 107 mmol/L    Bicarbonate 23 21 -  32 mmol/L    Anion Gap 14 10 - 20 mmol/L    Urea Nitrogen 23 6 - 23 mg/dL    Creatinine 1.19 (H) 0.50 - 1.05 mg/dL    eGFR 48 (L) >60 mL/min/1.73m*2    Calcium 9.4 8.6 - 10.3 mg/dL        CT head wo IV contrast   Final Result   Similar appearance with advanced chronic small vessel ischemic change   and remote left basal ganglia lacunar infarct. If concern for new   ischemic changes, consider MRI. Degree of volume loss vascular   changes does appear to be advanced for age.        No evidence of intracranial hemorrhage or displaced skull fracture.        MACRO:   None        Signed by: Renny Cast 2/13/2024 9:08 PM   Dictation workstation:   [id number]      XR chest 1 view   Final Result   No airspace consolidation or pleural effusion.        MACRO:   None        Signed by: Kit Woods 2/13/2024 7:56 PM   Dictation workstation:   [id number]      MR brain wo IV contrast    (Results Pending)       Scheduled medications  atorvastatin, 80 mg, oral, Nightly  cefTRIAXone, 1 g, intravenous, q24h  enoxaparin, 40 mg, subcutaneous, q24h  lisinopril, 10 mg, oral, Daily  melatonin, 3 mg, oral, Daily  sennosides, 2 tablet, oral, BID      Continuous medications     PRN medications  PRN medications: acetaminophen **OR** acetaminophen **OR** acetaminophen, OLANZapine         Assessment/Plan                  Principal Problem:    Altered mental status, unspecified altered mental status type  Active Problems:    Confusion    AMS (altered mental status)    # Altered mental status  -no clear etiology  -MRI brain pending  -neurology consult  -PT/OT  -psych consult for capacity eval  -monitor    # UTI  -started on ceftriaxone  -follow up on urine cx    # HTN  -continue home meds    # HLD  -continue statin    # DVT ppx              Celsa Leyva MD

## 2024-02-15 NOTE — PROGRESS NOTES
2/15/2024 11:54 AM I spoke with patient's sister. I discussed that patient is walking too far to qualify for SNF. Sister agrees that patient needs ECF. We discussed medicaid guidelines. Patient will not qualify for medicaid. Sister agrees to private pay. She requests referral to [facility]. Scarlet Bravo [facility]

## 2024-02-15 NOTE — PROGRESS NOTES
2/15/2024 2:52 PM Patient accepted at [facility] for their memory care unit under private pay. Sister informed. Scarlet LEMUS

## 2024-02-16 LAB
ANION GAP SERPL CALC-SCNC: 14 MMOL/L (ref 10–20)
BUN SERPL-MCNC: 19 MG/DL (ref 6–23)
CALCIUM SERPL-MCNC: 8.7 MG/DL (ref 8.6–10.3)
CHLORIDE SERPL-SCNC: 111 MMOL/L (ref 98–107)
CO2 SERPL-SCNC: 23 MMOL/L (ref 21–32)
CREAT SERPL-MCNC: 1.02 MG/DL (ref 0.5–1.05)
EGFRCR SERPLBLD CKD-EPI 2021: 58 ML/MIN/1.73M*2
ERYTHROCYTE [DISTWIDTH] IN BLOOD BY AUTOMATED COUNT: 13.7 % (ref 11.5–14.5)
GLUCOSE SERPL-MCNC: 100 MG/DL (ref 74–99)
HCT VFR BLD AUTO: 38 % (ref 36–46)
HGB BLD-MCNC: 11.8 G/DL (ref 12–16)
MCH RBC QN AUTO: 31.6 PG (ref 26–34)
MCHC RBC AUTO-ENTMCNC: 31.1 G/DL (ref 32–36)
MCV RBC AUTO: 102 FL (ref 80–100)
NRBC BLD-RTO: 0 /100 WBCS (ref 0–0)
PLATELET # BLD AUTO: 267 X10*3/UL (ref 150–450)
POTASSIUM SERPL-SCNC: 3.7 MMOL/L (ref 3.5–5.3)
RBC # BLD AUTO: 3.74 X10*6/UL (ref 4–5.2)
SODIUM SERPL-SCNC: 144 MMOL/L (ref 136–145)
WBC # BLD AUTO: 7.8 X10*3/UL (ref 4.4–11.3)

## 2024-02-16 PROCEDURE — 85027 COMPLETE CBC AUTOMATED: CPT | Performed by: PHYSICIAN ASSISTANT

## 2024-02-16 PROCEDURE — 97530 THERAPEUTIC ACTIVITIES: CPT | Mod: GO

## 2024-02-16 PROCEDURE — 80048 BASIC METABOLIC PNL TOTAL CA: CPT | Performed by: PHYSICIAN ASSISTANT

## 2024-02-16 PROCEDURE — 36415 COLL VENOUS BLD VENIPUNCTURE: CPT | Performed by: PHYSICIAN ASSISTANT

## 2024-02-16 PROCEDURE — 2500000004 HC RX 250 GENERAL PHARMACY W/ HCPCS (ALT 636 FOR OP/ED): Performed by: EMERGENCY MEDICINE

## 2024-02-16 PROCEDURE — G0378 HOSPITAL OBSERVATION PER HR: HCPCS

## 2024-02-16 PROCEDURE — 1100000001 HC PRIVATE ROOM DAILY

## 2024-02-16 PROCEDURE — 99223 1ST HOSP IP/OBS HIGH 75: CPT | Performed by: PSYCHIATRY & NEUROLOGY

## 2024-02-16 PROCEDURE — 2500000001 HC RX 250 WO HCPCS SELF ADMINISTERED DRUGS (ALT 637 FOR MEDICARE OP): Performed by: PHYSICIAN ASSISTANT

## 2024-02-16 PROCEDURE — 99232 SBSQ HOSP IP/OBS MODERATE 35: CPT | Performed by: INTERNAL MEDICINE

## 2024-02-16 RX ADMIN — Medication 3 MG: at 20:17

## 2024-02-16 RX ADMIN — STANDARDIZED SENNA CONCENTRATE 17.2 MG: 8.6 TABLET ORAL at 20:18

## 2024-02-16 RX ADMIN — OLANZAPINE 2.5 MG: 10 INJECTION, POWDER, FOR SOLUTION INTRAMUSCULAR at 04:50

## 2024-02-16 RX ADMIN — ATORVASTATIN CALCIUM 80 MG: 80 TABLET, FILM COATED ORAL at 20:18

## 2024-02-16 ASSESSMENT — COGNITIVE AND FUNCTIONAL STATUS - GENERAL
DAILY ACTIVITIY SCORE: 15
PERSONAL GROOMING: A LITTLE
MOVING TO AND FROM BED TO CHAIR: A LOT
DRESSING REGULAR UPPER BODY CLOTHING: A LITTLE
STANDING UP FROM CHAIR USING ARMS: A LOT
MOBILITY SCORE: 14
TOILETING: A LOT
WALKING IN HOSPITAL ROOM: A LOT
HELP NEEDED FOR BATHING: A LOT
TURNING FROM BACK TO SIDE WHILE IN FLAT BAD: A LITTLE
HELP NEEDED FOR BATHING: A LOT
PERSONAL GROOMING: A LITTLE
DRESSING REGULAR LOWER BODY CLOTHING: TOTAL
DAILY ACTIVITIY SCORE: 15
DRESSING REGULAR UPPER BODY CLOTHING: A LITTLE
MOVING FROM LYING ON BACK TO SITTING ON SIDE OF FLAT BED WITH BEDRAILS: A LITTLE
DRESSING REGULAR LOWER BODY CLOTHING: TOTAL
TOILETING: A LOT
CLIMB 3 TO 5 STEPS WITH RAILING: A LOT

## 2024-02-16 ASSESSMENT — PAIN SCALES - GENERAL
PAINLEVEL_OUTOF10: 0 - NO PAIN
PAINLEVEL_OUTOF10: 0 - NO PAIN

## 2024-02-16 NOTE — CARE PLAN
The patient's goals for the shift include pt will have psy consult via zoom    The clinical goals for the shift include is to have remove soft restriants    Pt was educated on the need for restraints, will continue to assess pt and keep safe     Over the shift, the patient did not make progress toward the following goals.

## 2024-02-16 NOTE — CONSULTS
Reason For Consult  Agitation, capacity    History Of Present Illness  Heather Romo is a 73 y.o. 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  A few days earlier had been hallucinating  mother and talking to her without insight, told sister.   Pt has no POA, is consent by next of kin, sister Renae.     Family thinks maybe she's on autism spectrum - has been a loner and isolative her whole life. Used to go to dinner with STEVE, but no longer (for several years).    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     From ED:  73 y.o. female with a history of HTN, HLD, TIA or CVA, CKD, Charcot-Rylie-Tooth, presents with confusion.  Patient herself is unable to provide a helpful history, states she is here because her blood pressure was elevated.  She is able to tell me that she lives alone in a house, that she has a sister around but that no one helps her on a daily basis.  States that she does not utilize any assistive devices to get around.  She denies feeling confused.  According to her sister who later came to bedside, she last saw the patient 4 days ago and she seemed okay.  The week prior patient had seemed like she was having difficulty getting the words out but this had resolved when she saw her last time.  Today, patient was not answering the phone.  When sister went to the house it was dark and the TV was off so she called 911.  The patient did eventually come to the door, was ambulatory but she seemed very confused.  She was talking about her  not being home even though he  40 years ago and was saying that she was going to see her mother who  3 years ago.   Sister wonders if maybe she had fallen.  She follows with Dr. Fiore, has not seen him in several years and stopped taking her medications.  Had a stroke  "several years ago at [place] but has not really followed up since.  Per EMS the house was in deplorable condition, smelled of urine with notable fecal material in the patient.    Head CT: global volume loss, lacunar infarcts L BG, advanced ischemic small vessel disease.   MRI pending  Strong suspicion for UTI based on ua - on ceftriaxone    Seen by neurology yesterday, pt with h.o. stroke, workup in progress.   Per neuro:   She was found in her home, confused and hallucinating. Among her hallucinations was talk about her , who  40 years ago, and trying to see her mother, who  three years ago. The home was unkempt and smelled of urine. He ceiling of her kitchen was partially collapsed and apparently had been for some time.     Pt sleeping, in restraints, very confused per staff     Past Medical History  She has a past medical history of Personal history of transient ischemic attack (TIA), and cerebral infarction without residual deficits (2014).    Surgical History  She has a past surgical history that includes Hand surgery (2014); MR angio head wo IV contrast (7/10/2014); MR angio neck wo IV contrast (7/10/2014); MR angio head wo IV contrast (2015); and MR angio neck wo IV contrast (2015).     Social History  She has no history on file for tobacco use, alcohol use, and drug use.    Family History  No family history on file.     Allergies  Patient has no known allergies.    Physical Exam  Physical Exam  Psychiatric:      Comments: Sleeping in restraints        Last Recorded Vitals  Blood pressure 123/52, pulse 103, temperature 36.6 °C (97.8 °F), temperature source Temporal, resp. rate 16, height 1.6 m (5' 3\"), weight 74 kg (163 lb 1.6 oz), SpO2 95 %.    Relevant Results  Scheduled medications  atorvastatin, 80 mg, oral, Nightly  cefTRIAXone, 1 g, intravenous, q24h  enoxaparin, 40 mg, subcutaneous, q24h  lisinopril, 10 mg, oral, Daily  melatonin, 3 mg, oral, Daily  sennosides, 2 " tablet, oral, BID      PRN medications: acetaminophen **OR** acetaminophen **OR** acetaminophen, OLANZapine       Results for orders placed or performed during the hospital encounter of 02/13/24 (from the past 96 hour(s))   ECG 12 lead   Result Value Ref Range    Ventricular Rate 56 BPM    Atrial Rate 56 BPM    OH Interval 174 ms    QRS Duration 78 ms    QT Interval 444 ms    QTC Calculation(Bazett) 428 ms    P Axis 32 degrees    R Axis -2 degrees    T Axis 28 degrees    QRS Count 9 beats    Q Onset 215 ms    P Onset 128 ms    P Offset 179 ms    T Offset 437 ms    QTC Fredericia 434 ms   Comprehensive metabolic panel   Result Value Ref Range    Glucose 103 (H) 74 - 99 mg/dL    Sodium 139 136 - 145 mmol/L    Potassium 4.2 3.5 - 5.3 mmol/L    Chloride 106 98 - 107 mmol/L    Bicarbonate 25 21 - 32 mmol/L    Anion Gap 12 10 - 20 mmol/L    Urea Nitrogen 20 6 - 23 mg/dL    Creatinine 1.09 (H) 0.50 - 1.05 mg/dL    eGFR 54 (L) >60 mL/min/1.73m*2    Calcium 8.8 8.6 - 10.3 mg/dL    Albumin 3.3 (L) 3.4 - 5.0 g/dL    Alkaline Phosphatase 80 33 - 136 U/L    Total Protein 6.6 6.4 - 8.2 g/dL    AST 35 9 - 39 U/L    Bilirubin, Total 0.6 0.0 - 1.2 mg/dL    ALT 20 7 - 45 U/L   Magnesium   Result Value Ref Range    Magnesium 1.90 1.60 - 2.40 mg/dL   Troponin I, High Sensitivity   Result Value Ref Range    Troponin I, High Sensitivity 31 (H) 0 - 13 ng/L   Creatine Kinase   Result Value Ref Range    Creatine Kinase 213 0 - 215 U/L   Lactate   Result Value Ref Range    Lactate 0.5 0.4 - 2.0 mmol/L   CBC and Auto Differential   Result Value Ref Range    WBC 6.9 4.4 - 11.3 x10*3/uL    nRBC 0.0 0.0 - 0.0 /100 WBCs    RBC 3.42 (L) 4.00 - 5.20 x10*6/uL    Hemoglobin 10.6 (L) 12.0 - 16.0 g/dL    Hematocrit 33.1 (L) 36.0 - 46.0 %    MCV 97 80 - 100 fL    MCH 31.0 26.0 - 34.0 pg    MCHC 32.0 32.0 - 36.0 g/dL    RDW 13.2 11.5 - 14.5 %    Platelets 251 150 - 450 x10*3/uL    Neutrophils % 74.9 40.0 - 80.0 %    Immature Granulocytes %, Automated 0.1  0.0 - 0.9 %    Lymphocytes % 16.5 13.0 - 44.0 %    Monocytes % 7.7 2.0 - 10.0 %    Eosinophils % 0.4 0.0 - 6.0 %    Basophils % 0.4 0.0 - 2.0 %    Neutrophils Absolute 5.12 1.60 - 5.50 x10*3/uL    Immature Granulocytes Absolute, Automated 0.01 0.00 - 0.50 x10*3/uL    Lymphocytes Absolute 1.13 0.80 - 3.00 x10*3/uL    Monocytes Absolute 0.53 0.05 - 0.80 x10*3/uL    Eosinophils Absolute 0.03 0.00 - 0.40 x10*3/uL    Basophils Absolute 0.03 0.00 - 0.10 x10*3/uL   Troponin I, High Sensitivity   Result Value Ref Range    Troponin I, High Sensitivity 31 (H) 0 - 13 ng/L   Lipid panel   Result Value Ref Range    Cholesterol 260 (H) 0 - 199 mg/dL    HDL-Cholesterol 46.5 mg/dL    Cholesterol/HDL Ratio 5.6     LDL Calculated 196 (H) <=99 mg/dL    VLDL 18 0 - 40 mg/dL    Triglycerides 90 0 - 149 mg/dL    Non HDL Cholesterol 214 (H) 0 - 149 mg/dL   ECG 12 lead   Result Value Ref Range    Ventricular Rate 67 BPM    Atrial Rate 67 BPM    NH Interval 158 ms    QRS Duration 78 ms    QT Interval 424 ms    QTC Calculation(Bazett) 448 ms    P Axis 50 degrees    R Axis 10 degrees    T Axis 39 degrees    QRS Count 10 beats    Q Onset 217 ms    P Onset 138 ms    P Offset 181 ms    T Offset 429 ms    QTC Fredericia 440 ms   Troponin I, High Sensitivity   Result Value Ref Range    Troponin I, High Sensitivity 35 (H) 0 - 13 ng/L   CBC   Result Value Ref Range    WBC 7.5 4.4 - 11.3 x10*3/uL    nRBC 0.0 0.0 - 0.0 /100 WBCs    RBC 3.63 (L) 4.00 - 5.20 x10*6/uL    Hemoglobin 11.3 (L) 12.0 - 16.0 g/dL    Hematocrit 36.8 36.0 - 46.0 %     (H) 80 - 100 fL    MCH 31.1 26.0 - 34.0 pg    MCHC 30.7 (L) 32.0 - 36.0 g/dL    RDW 13.4 11.5 - 14.5 %    Platelets 259 150 - 450 x10*3/uL   Basic metabolic panel   Result Value Ref Range    Glucose 76 74 - 99 mg/dL    Sodium 144 136 - 145 mmol/L    Potassium 3.4 (L) 3.5 - 5.3 mmol/L    Chloride 109 (H) 98 - 107 mmol/L    Bicarbonate 24 21 - 32 mmol/L    Anion Gap 14 10 - 20 mmol/L    Urea Nitrogen 19 6 - 23  mg/dL    Creatinine 1.02 0.50 - 1.05 mg/dL    eGFR 58 (L) >60 mL/min/1.73m*2    Calcium 8.9 8.6 - 10.3 mg/dL   Urinalysis with Reflex Culture and Microscopic   Result Value Ref Range    Color, Urine Yellow Straw, Yellow    Appearance, Urine Hazy (N) Clear    Specific Gravity, Urine 1.021 1.005 - 1.035    pH, Urine 5.0 5.0, 5.5, 6.0, 6.5, 7.0, 7.5, 8.0    Protein, Urine 100 (2+) (N) NEGATIVE mg/dL    Glucose, Urine NEGATIVE NEGATIVE mg/dL    Blood, Urine NEGATIVE NEGATIVE    Ketones, Urine 20 (1+) (A) NEGATIVE mg/dL    Bilirubin, Urine NEGATIVE NEGATIVE    Urobilinogen, Urine 2.0 (N) <2.0 mg/dL    Nitrite, Urine NEGATIVE NEGATIVE    Leukocyte Esterase, Urine LARGE (3+) (A) NEGATIVE   Microscopic Only, Urine   Result Value Ref Range    WBC, Urine 11-20 (A) 1-5, NONE /HPF    RBC, Urine 1-2 NONE, 1-2, 3-5 /HPF    Squamous Epithelial Cells, Urine 10-25 (FEW) Reference range not established. /HPF    Bacteria, Urine 1+ (A) NONE SEEN /HPF    Mucus, Urine 1+ Reference range not established. /LPF    Calcium Oxalate Crystals, Urine 3+ (A) NONE, 1+ /HPF    Amorphous Crystals, Urine 1+ NONE, 1+, 2+ /HPF   Urine Culture    Specimen: Clean Catch/Voided; Urine   Result Value Ref Range    Urine Culture No significant growth    Vitamin B12   Result Value Ref Range    Vitamin B12 1,351 (H) 211 - 911 pg/mL   TSH   Result Value Ref Range    Thyroid Stimulating Hormone 1.16 0.44 - 3.98 mIU/L   Syphilis Screen with Reflex   Result Value Ref Range    Syphilis Total Ab Nonreactive Nonreactive   CBC   Result Value Ref Range    WBC 7.4 4.4 - 11.3 x10*3/uL    nRBC 0.0 0.0 - 0.0 /100 WBCs    RBC 3.83 (L) 4.00 - 5.20 x10*6/uL    Hemoglobin 11.9 (L) 12.0 - 16.0 g/dL    Hematocrit 39.4 36.0 - 46.0 %     (H) 80 - 100 fL    MCH 31.1 26.0 - 34.0 pg    MCHC 30.2 (L) 32.0 - 36.0 g/dL    RDW 13.7 11.5 - 14.5 %    Platelets 270 150 - 450 x10*3/uL   Basic metabolic panel   Result Value Ref Range    Glucose 105 (H) 74 - 99 mg/dL    Sodium 142 136 -  145 mmol/L    Potassium 4.1 3.5 - 5.3 mmol/L    Chloride 109 (H) 98 - 107 mmol/L    Bicarbonate 23 21 - 32 mmol/L    Anion Gap 14 10 - 20 mmol/L    Urea Nitrogen 23 6 - 23 mg/dL    Creatinine 1.19 (H) 0.50 - 1.05 mg/dL    eGFR 48 (L) >60 mL/min/1.73m*2    Calcium 9.4 8.6 - 10.3 mg/dL   Folate   Result Value Ref Range    Folate, Serum >24.0 >5.0 ng/mL   CBC   Result Value Ref Range    WBC 7.8 4.4 - 11.3 x10*3/uL    nRBC 0.0 0.0 - 0.0 /100 WBCs    RBC 3.74 (L) 4.00 - 5.20 x10*6/uL    Hemoglobin 11.8 (L) 12.0 - 16.0 g/dL    Hematocrit 38.0 36.0 - 46.0 %     (H) 80 - 100 fL    MCH 31.6 26.0 - 34.0 pg    MCHC 31.1 (L) 32.0 - 36.0 g/dL    RDW 13.7 11.5 - 14.5 %    Platelets 267 150 - 450 x10*3/uL   Basic metabolic panel   Result Value Ref Range    Glucose 100 (H) 74 - 99 mg/dL    Sodium 144 136 - 145 mmol/L    Potassium 3.7 3.5 - 5.3 mmol/L    Chloride 111 (H) 98 - 107 mmol/L    Bicarbonate 23 21 - 32 mmol/L    Anion Gap 14 10 - 20 mmol/L    Urea Nitrogen 19 6 - 23 mg/dL    Creatinine 1.02 0.50 - 1.05 mg/dL    eGFR 58 (L) >60 mL/min/1.73m*2    Calcium 8.7 8.6 - 10.3 mg/dL       Assessment/Plan     IMP:  Suspect UTI delirium, possible encephalopathy  Dementia unquantified with behavioral disturbance   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     I spent 60 minutes in the professional and overall care of this patient.      Ilene Yang MD

## 2024-02-16 NOTE — CARE PLAN
Problem: Pain - Adult  Goal: Verbalizes/displays adequate comfort level or baseline comfort level  Outcome: Progressing     Problem: Safety - Adult  Goal: Free from fall injury  Outcome: Progressing     Problem: Chronic Conditions and Co-morbidities  Goal: Patient's chronic conditions and co-morbidity symptoms are monitored and maintained or improved  Outcome: Progressing     Problem: Skin  Goal: Prevent/manage excess moisture  Flowsheets (Taken 2/16/2024 0016)  Prevent/manage excess moisture: Cleanse incontinence/protect with barrier cream    The patient's goals for the shift include  maintain safety     The clinical goals for the shift include maintain safety

## 2024-02-16 NOTE — PROGRESS NOTES
2/16/2024 11:37 AM Patient accepted at [facility]. Facility is unable to provide Zyprexa so patient will need to be off that med. Scarlet LEMUS

## 2024-02-16 NOTE — PROGRESS NOTES
Care Coordinator Note:  Plan: brain MRI pending, psych consult for capacity eval after requested labs  Status: observation  Disposition: [facility]    Janki ADAIRN, RN TCC

## 2024-02-16 NOTE — PROGRESS NOTES
Heather Romo is a 73 y.o. female on day 1 of admission presenting with Altered mental status, unspecified altered mental status type.      Subjective   Pt seen and examined.        Objective     Last Recorded Vitals  /58 (BP Location: Right arm, Patient Position: Lying)   Pulse 77   Temp 36.7 °C (98.1 °F) (Tympanic)   Resp 16   Wt 74 kg (163 lb 1.6 oz)   SpO2 97%   Intake/Output last 3 Shifts:    Intake/Output Summary (Last 24 hours) at 2/16/2024 1301  Last data filed at 2/16/2024 0912  Gross per 24 hour   Intake 180 ml   Output 450 ml   Net -270 ml         Admission Weight  Weight: 74 kg (163 lb 1.6 oz) (02/13/24 1617)    Daily Weight  02/13/24 : 74 kg (163 lb 1.6 oz)      Physical Exam    Constitutional: No acute distress, awake, alert  Head/Neck: Neck supple  Respiratory/Thorax: Lungs are Clear to auscultation  Cardiovascular: Regular, rate and rhythm,  2+ equal pulses of the extremities, normal S 1and S 2  Gastrointestinal: Nondistended, soft, non-tender, no rebound tenderness or guarding  Extremities: Braces on lower extremities  Neurological: Awake and alert.  confused, no focal neuro deficits  Psychological: Appropriate mood and behavior    Relevant Results  Results for orders placed or performed during the hospital encounter of 02/13/24 (from the past 24 hour(s))   CBC   Result Value Ref Range    WBC 7.8 4.4 - 11.3 x10*3/uL    nRBC 0.0 0.0 - 0.0 /100 WBCs    RBC 3.74 (L) 4.00 - 5.20 x10*6/uL    Hemoglobin 11.8 (L) 12.0 - 16.0 g/dL    Hematocrit 38.0 36.0 - 46.0 %     (H) 80 - 100 fL    MCH 31.6 26.0 - 34.0 pg    MCHC 31.1 (L) 32.0 - 36.0 g/dL    RDW 13.7 11.5 - 14.5 %    Platelets 267 150 - 450 x10*3/uL   Basic metabolic panel   Result Value Ref Range    Glucose 100 (H) 74 - 99 mg/dL    Sodium 144 136 - 145 mmol/L    Potassium 3.7 3.5 - 5.3 mmol/L    Chloride 111 (H) 98 - 107 mmol/L    Bicarbonate 23 21 - 32 mmol/L    Anion Gap 14 10 - 20 mmol/L    Urea Nitrogen 19 6 - 23 mg/dL     Creatinine 1.02 0.50 - 1.05 mg/dL    eGFR 58 (L) >60 mL/min/1.73m*2    Calcium 8.7 8.6 - 10.3 mg/dL        CT head wo IV contrast   Final Result   Similar appearance with advanced chronic small vessel ischemic change   and remote left basal ganglia lacunar infarct. If concern for new   ischemic changes, consider MRI. Degree of volume loss vascular   changes does appear to be advanced for age.        No evidence of intracranial hemorrhage or displaced skull fracture.        MACRO:   None        Signed by: Renny Cast 2/13/2024 9:08 PM   Dictation workstation:   [id number]      XR chest 1 view   Final Result   No airspace consolidation or pleural effusion.        MACRO:   None        Signed by: Kit Woods 2/13/2024 7:56 PM   Dictation workstation:   [id number]      MR brain wo IV contrast    (Results Pending)       Scheduled medications  atorvastatin, 80 mg, oral, Nightly  enoxaparin, 40 mg, subcutaneous, q24h  lisinopril, 10 mg, oral, Daily  melatonin, 3 mg, oral, Daily  sennosides, 2 tablet, oral, BID      Continuous medications     PRN medications  PRN medications: acetaminophen **OR** acetaminophen **OR** acetaminophen, OLANZapine         Assessment/Plan                  Principal Problem:    Altered mental status, unspecified altered mental status type  Active Problems:    Confusion    AMS (altered mental status)    # Altered mental status  # Alzheimer's dementia?  -no clear etiology  -MRI brain pending  -neurology consult  -PT/OT  -psych consult for capacity eval  -monitor    # UTI  -urine cx negative  -ceftriaxone stopped    # HTN  -continue home meds    # HLD  -continue statin    # DVT ppx              Celsa Leyva MD

## 2024-02-16 NOTE — PROGRESS NOTES
"Occupational Therapy    Occupational Therapy Treatment    Name: Heather Romo  MRN: [id number]  : 1950  Date: 24  Time Calculation  Start Time: 1500  Stop Time: 1519  Time Calculation (min): 19 min    Assessment:  OT Assessment: poor progression to goals, limited participate today due to cognition  Prognosis: Fair  Medical Staff Made Aware: Yes  End of Session Communication: Bedside nurse, PCT/NA/CTA  End of Session Patient Position: Bed, 3 rail up, Alarm on (soft wrist restraints on)  Plan:  Treatment Interventions: ADL retraining, Functional transfer training, UE strengthening/ROM, Endurance training, Cognitive reorientation, Patient/family training, Equipment evaluation/education  OT Frequency: 3 times per week  OT Discharge Recommendations: Moderate intensity level of continued care  OT Recommended Transfer Status: Moderate assist, Assist of 1  OT - OK to Discharge: Yes    Subjective   Previous Visit Info:  OT Last Visit  OT Received On: 24  General:  General  Patient Position Received: Bed, 4 rail up, Alarm on (bilateral wrist restraints,)  General Comment: pt confused, difficulty to redirect at times, BLE's off EOB below rail on arrival.  Precautions:  Medical Precautions:  (cognitive impairments; fall precautions)  Vitals:     Pain Assessment:  Pain Assessment  Pain Score: 0 - No pain     Cognition  Orientation level- oriented to self, year and month. disoriented to situation/place; pt stating she is \"near the kitchen\" unable to successfully re-orient     alert-- Attempted MoCA assessment with pt, though pt unable to follow commands well enough to successfully complete MoCA     Following commands- follows ~25 % of commands with increased repetition and tactile cues     Bed Mobility/Transfers: Bed Mobility  Bed Mobility: Yes  Bed Mobility 1  Bed Mobility 1: Supine to sitting, Sitting to supine  Level of Assistance 1: Moderate assistance, Maximum verbal cues, Maximum tactile cues  Bed " Mobility Comments 1: max v/t cues to redirect to return to supine/pt confused, Increased trunk support for bed mobility  Bed Mobility 2  Level of Assistance 2: Maximum tactile cues, Maximum assistance  Bed Mobility Comments 2: max x2 to boost higher in bed    Sitting Balance:  Static Sitting Balance  Static Sitting-Level of Assistance: Minimum assistance, Contact guard (retroleaning, min/cga to correct)  Dynamic Sitting Balance  Dynamic Sitting-Balance:  (mod assist, pt retro-leaning)    Outcome Measures:  [facility] Daily Activity  Putting on and taking off regular lower body clothing: Total  Bathing (including washing, rinsing, drying): A lot  Putting on and taking off regular upper body clothing: A little  Toileting, which includes using toilet, bedpan or urinal: A lot  Taking care of personal grooming such as brushing teeth: A little  Eating Meals: None  Daily Activity - Total Score: 15        Education Documentation  No documentation found.  Education Comments  No comments found.      Goals:  Encounter Problems       Encounter Problems (Active)       ADLs       Patient will perform UB and LB sponge bathing  with setup assist level of assistance. (Progressing)       Start:  02/14/24    Expected End:  02/19/24            Patient will complete daily grooming tasks with modified independent level of assistance and PRN adaptive equipment while standing. (Progressing)       Start:  02/14/24    Expected End:  02/19/24            Patient will complete toileting including hygiene clothing management/hygiene with modified independent level of assistance and raised toilet seat and grab bars. (Progressing)       Start:  02/14/24    Expected End:  02/19/24               COGNITION/SAFETY       Patient will follow 100% Two step commands to allow improved ADL performance. (Progressing)       Start:  02/14/24    Expected End:  02/19/24            Patient will demonstrated orientation x 3 with 1 verbal cue. (Progressing)       Start:   02/14/24    Expected End:  02/19/24       ORIENTATION            MOBILITY       Patient will perform Functional mobility  Household distances/Community Distances with  mod independence level of assistance and least restrictive device in order to improve safety and functional mobility. (Progressing)       Start:  02/14/24    Expected End:  02/19/24

## 2024-02-17 LAB
ANION GAP SERPL CALC-SCNC: 13 MMOL/L (ref 10–20)
BUN SERPL-MCNC: 15 MG/DL (ref 6–23)
CALCIUM SERPL-MCNC: 8.9 MG/DL (ref 8.6–10.3)
CHLORIDE SERPL-SCNC: 108 MMOL/L (ref 98–107)
CO2 SERPL-SCNC: 25 MMOL/L (ref 21–32)
CREAT SERPL-MCNC: 0.9 MG/DL (ref 0.5–1.05)
EGFRCR SERPLBLD CKD-EPI 2021: 68 ML/MIN/1.73M*2
ERYTHROCYTE [DISTWIDTH] IN BLOOD BY AUTOMATED COUNT: 13.7 % (ref 11.5–14.5)
GLUCOSE SERPL-MCNC: 100 MG/DL (ref 74–99)
HCT VFR BLD AUTO: 40.2 % (ref 36–46)
HGB BLD-MCNC: 12.5 G/DL (ref 12–16)
MCH RBC QN AUTO: 31.1 PG (ref 26–34)
MCHC RBC AUTO-ENTMCNC: 31.1 G/DL (ref 32–36)
MCV RBC AUTO: 100 FL (ref 80–100)
NRBC BLD-RTO: 0 /100 WBCS (ref 0–0)
PLATELET # BLD AUTO: 251 X10*3/UL (ref 150–450)
POTASSIUM SERPL-SCNC: 4 MMOL/L (ref 3.5–5.3)
RBC # BLD AUTO: 4.02 X10*6/UL (ref 4–5.2)
SODIUM SERPL-SCNC: 142 MMOL/L (ref 136–145)
WBC # BLD AUTO: 9.4 X10*3/UL (ref 4.4–11.3)

## 2024-02-17 PROCEDURE — 99232 SBSQ HOSP IP/OBS MODERATE 35: CPT | Performed by: INTERNAL MEDICINE

## 2024-02-17 PROCEDURE — 85027 COMPLETE CBC AUTOMATED: CPT | Performed by: INTERNAL MEDICINE

## 2024-02-17 PROCEDURE — 2500000004 HC RX 250 GENERAL PHARMACY W/ HCPCS (ALT 636 FOR OP/ED): Performed by: EMERGENCY MEDICINE

## 2024-02-17 PROCEDURE — G0378 HOSPITAL OBSERVATION PER HR: HCPCS

## 2024-02-17 PROCEDURE — 99233 SBSQ HOSP IP/OBS HIGH 50: CPT | Performed by: PSYCHIATRY & NEUROLOGY

## 2024-02-17 PROCEDURE — 2500000004 HC RX 250 GENERAL PHARMACY W/ HCPCS (ALT 636 FOR OP/ED): Performed by: PHYSICIAN ASSISTANT

## 2024-02-17 PROCEDURE — 80048 BASIC METABOLIC PNL TOTAL CA: CPT | Performed by: INTERNAL MEDICINE

## 2024-02-17 PROCEDURE — 36415 COLL VENOUS BLD VENIPUNCTURE: CPT | Performed by: INTERNAL MEDICINE

## 2024-02-17 PROCEDURE — 2500000004 HC RX 250 GENERAL PHARMACY W/ HCPCS (ALT 636 FOR OP/ED): Performed by: HOSPITALIST

## 2024-02-17 PROCEDURE — 2500000004 HC RX 250 GENERAL PHARMACY W/ HCPCS (ALT 636 FOR OP/ED): Performed by: PSYCHIATRY & NEUROLOGY

## 2024-02-17 PROCEDURE — 1100000001 HC PRIVATE ROOM DAILY

## 2024-02-17 PROCEDURE — 2500000001 HC RX 250 WO HCPCS SELF ADMINISTERED DRUGS (ALT 637 FOR MEDICARE OP): Performed by: PHYSICIAN ASSISTANT

## 2024-02-17 RX ORDER — HYDRALAZINE HYDROCHLORIDE 20 MG/ML
10 INJECTION INTRAMUSCULAR; INTRAVENOUS EVERY 6 HOURS PRN
Status: DISCONTINUED | OUTPATIENT
Start: 2024-02-17 | End: 2024-02-27 | Stop reason: HOSPADM

## 2024-02-17 RX ORDER — LANOLIN ALCOHOL/MO/W.PET/CERES
100 CREAM (GRAM) TOPICAL DAILY
Status: DISCONTINUED | OUTPATIENT
Start: 2024-02-17 | End: 2024-02-21

## 2024-02-17 RX ORDER — HYDRALAZINE HYDROCHLORIDE 10 MG/1
10 TABLET, FILM COATED ORAL EVERY 8 HOURS PRN
Status: DISCONTINUED | OUTPATIENT
Start: 2024-02-17 | End: 2024-02-27 | Stop reason: HOSPADM

## 2024-02-17 RX ADMIN — STANDARDIZED SENNA CONCENTRATE 17.2 MG: 8.6 TABLET ORAL at 21:00

## 2024-02-17 RX ADMIN — THIAMINE HYDROCHLORIDE 500 MG: 100 INJECTION, SOLUTION INTRAMUSCULAR; INTRAVENOUS at 16:06

## 2024-02-17 RX ADMIN — THIAMINE HYDROCHLORIDE 500 MG: 100 INJECTION, SOLUTION INTRAMUSCULAR; INTRAVENOUS at 21:38

## 2024-02-17 RX ADMIN — THIAMINE HYDROCHLORIDE 500 MG: 100 INJECTION, SOLUTION INTRAMUSCULAR; INTRAVENOUS at 12:48

## 2024-02-17 RX ADMIN — ATORVASTATIN CALCIUM 80 MG: 80 TABLET, FILM COATED ORAL at 21:36

## 2024-02-17 RX ADMIN — HYDRALAZINE HYDROCHLORIDE 10 MG: 20 INJECTION INTRAMUSCULAR; INTRAVENOUS at 12:50

## 2024-02-17 RX ADMIN — Medication 3 MG: at 21:36

## 2024-02-17 RX ADMIN — OLANZAPINE 2.5 MG: 10 INJECTION, POWDER, FOR SOLUTION INTRAMUSCULAR at 01:45

## 2024-02-17 RX ADMIN — STANDARDIZED SENNA CONCENTRATE 17.2 MG: 8.6 TABLET ORAL at 08:56

## 2024-02-17 RX ADMIN — ENOXAPARIN SODIUM 40 MG: 40 INJECTION SUBCUTANEOUS at 01:44

## 2024-02-17 RX ADMIN — LISINOPRIL 10 MG: 10 TABLET ORAL at 08:56

## 2024-02-17 ASSESSMENT — PAIN - FUNCTIONAL ASSESSMENT: PAIN_FUNCTIONAL_ASSESSMENT: 0-10

## 2024-02-17 ASSESSMENT — PAIN SCALES - GENERAL: PAINLEVEL_OUTOF10: 0 - NO PAIN

## 2024-02-17 NOTE — PROGRESS NOTES
"Heather Romo is a 73 y.o. female on day 1 of admission presenting with Altered mental status, unspecified altered mental status type.    Subjective   Hypertensive, no IV access  Continues agitated in restraints overnight  Ceftriaxone discontinued due to neg urine culture.     No appetite, ate one cookie   Was awake and talking a little today, but eyes are continually closed, goes back into deep sleep without responding to verbal stimulation.     Detailed FH from [facility] who has known her for 50 yrs - they thinks she's \"on the spectrum\", odd, isolative, loner all her life.   Was talking about hallucinations of  mother recently.     Had to leave her job at [employer] about 5 yrs ago due to problems with family and tasks.     Exam by zoom with nurse and [facility] assist - pt appears to be psychotic and internally preoccupied.   Suspect h.o. schizoid or schizophrenia  Now catatonic    Discussed in detail with family and team -   The first goal is to get her eating, no psych unit will take her if she is not eating or drinking.   She will need a swallow asap when awakens.   There is no POA - treatment is next of kin or emergency    A/P  Psychotic Disorder  Catatonia   Likely dementia  Possible Depression    PLAN:  Sitter at all times  Restraints per primary service  Start Haldol 1 mg q8hrs IM if not taking po  Lorazepam 0.5 mg TID  Haldol 3 mg IM twice daily prn agitation  Add mirtazapine 7.5 mg hs but may not be taking po,  Magic cup or protein pudding until passes swallow (nutrition consult)  May need IV access      Objective     Physical Exam  Psychiatric:         Attention and Perception: She is inattentive. She perceives auditory and visual hallucinations.         Speech: She is noncommunicative.         Behavior: Behavior is aggressive and withdrawn.         Thought Content: Thought content is delusional. Thought content does not include homicidal or suicidal ideation.         Cognition and Memory: " "Cognition is impaired.      Comments: Awakens for an instant, pudding drool, deeply internally preoccupied. Not responding to stimuli or answering questions but is rousable.          Last Recorded Vitals  Blood pressure (!) 193/66, pulse 63, temperature 36.2 °C (97.2 °F), temperature source Temporal, resp. rate 18, height 1.6 m (5' 3\"), weight 74 kg (163 lb 1.6 oz), SpO2 97 %.  Intake/Output last 3 Shifts:  I/O last 3 completed shifts:  In: - (0 mL/kg)   Out: 450 (6.1 mL/kg) [Urine:450 (0.2 mL/kg/hr)]  Weight: 74 kg     Relevant Results  Scheduled medications  atorvastatin, 80 mg, oral, Nightly  enoxaparin, 40 mg, subcutaneous, q24h  lisinopril, 10 mg, oral, Daily  melatonin, 3 mg, oral, Daily  sennosides, 2 tablet, oral, BID  thiamine, 100 mg, oral, Daily  thiamine, 500 mg, intravenous, TID      PRN medications: acetaminophen **OR** acetaminophen **OR** acetaminophen, hydrALAZINE, hydrALAZINE, OLANZapine    Results for orders placed or performed during the hospital encounter of 02/13/24 (from the past 96 hour(s))   ECG 12 lead   Result Value Ref Range    Ventricular Rate 56 BPM    Atrial Rate 56 BPM    OH Interval 174 ms    QRS Duration 78 ms    QT Interval 444 ms    QTC Calculation(Bazett) 428 ms    P Axis 32 degrees    R Axis -2 degrees    T Axis 28 degrees    QRS Count 9 beats    Q Onset 215 ms    P Onset 128 ms    P Offset 179 ms    T Offset 437 ms    QTC Fredericia 434 ms   Comprehensive metabolic panel   Result Value Ref Range    Glucose 103 (H) 74 - 99 mg/dL    Sodium 139 136 - 145 mmol/L    Potassium 4.2 3.5 - 5.3 mmol/L    Chloride 106 98 - 107 mmol/L    Bicarbonate 25 21 - 32 mmol/L    Anion Gap 12 10 - 20 mmol/L    Urea Nitrogen 20 6 - 23 mg/dL    Creatinine 1.09 (H) 0.50 - 1.05 mg/dL    eGFR 54 (L) >60 mL/min/1.73m*2    Calcium 8.8 8.6 - 10.3 mg/dL    Albumin 3.3 (L) 3.4 - 5.0 g/dL    Alkaline Phosphatase 80 33 - 136 U/L    Total Protein 6.6 6.4 - 8.2 g/dL    AST 35 9 - 39 U/L    Bilirubin, Total 0.6 " 0.0 - 1.2 mg/dL    ALT 20 7 - 45 U/L   Magnesium   Result Value Ref Range    Magnesium 1.90 1.60 - 2.40 mg/dL   Troponin I, High Sensitivity   Result Value Ref Range    Troponin I, High Sensitivity 31 (H) 0 - 13 ng/L   Creatine Kinase   Result Value Ref Range    Creatine Kinase 213 0 - 215 U/L   Lactate   Result Value Ref Range    Lactate 0.5 0.4 - 2.0 mmol/L   CBC and Auto Differential   Result Value Ref Range    WBC 6.9 4.4 - 11.3 x10*3/uL    nRBC 0.0 0.0 - 0.0 /100 WBCs    RBC 3.42 (L) 4.00 - 5.20 x10*6/uL    Hemoglobin 10.6 (L) 12.0 - 16.0 g/dL    Hematocrit 33.1 (L) 36.0 - 46.0 %    MCV 97 80 - 100 fL    MCH 31.0 26.0 - 34.0 pg    MCHC 32.0 32.0 - 36.0 g/dL    RDW 13.2 11.5 - 14.5 %    Platelets 251 150 - 450 x10*3/uL    Neutrophils % 74.9 40.0 - 80.0 %    Immature Granulocytes %, Automated 0.1 0.0 - 0.9 %    Lymphocytes % 16.5 13.0 - 44.0 %    Monocytes % 7.7 2.0 - 10.0 %    Eosinophils % 0.4 0.0 - 6.0 %    Basophils % 0.4 0.0 - 2.0 %    Neutrophils Absolute 5.12 1.60 - 5.50 x10*3/uL    Immature Granulocytes Absolute, Automated 0.01 0.00 - 0.50 x10*3/uL    Lymphocytes Absolute 1.13 0.80 - 3.00 x10*3/uL    Monocytes Absolute 0.53 0.05 - 0.80 x10*3/uL    Eosinophils Absolute 0.03 0.00 - 0.40 x10*3/uL    Basophils Absolute 0.03 0.00 - 0.10 x10*3/uL   Troponin I, High Sensitivity   Result Value Ref Range    Troponin I, High Sensitivity 31 (H) 0 - 13 ng/L   Lipid panel   Result Value Ref Range    Cholesterol 260 (H) 0 - 199 mg/dL    HDL-Cholesterol 46.5 mg/dL    Cholesterol/HDL Ratio 5.6     LDL Calculated 196 (H) <=99 mg/dL    VLDL 18 0 - 40 mg/dL    Triglycerides 90 0 - 149 mg/dL    Non HDL Cholesterol 214 (H) 0 - 149 mg/dL   ECG 12 lead   Result Value Ref Range    Ventricular Rate 67 BPM    Atrial Rate 67 BPM    TX Interval 158 ms    QRS Duration 78 ms    QT Interval 424 ms    QTC Calculation(Bazett) 448 ms    P Axis 50 degrees    R Axis 10 degrees    T Axis 39 degrees    QRS Count 10 beats    Q Onset 217 ms     P Onset 138 ms    P Offset 181 ms    T Offset 429 ms    QTC Fredericia 440 ms   Troponin I, High Sensitivity   Result Value Ref Range    Troponin I, High Sensitivity 35 (H) 0 - 13 ng/L   CBC   Result Value Ref Range    WBC 7.5 4.4 - 11.3 x10*3/uL    nRBC 0.0 0.0 - 0.0 /100 WBCs    RBC 3.63 (L) 4.00 - 5.20 x10*6/uL    Hemoglobin 11.3 (L) 12.0 - 16.0 g/dL    Hematocrit 36.8 36.0 - 46.0 %     (H) 80 - 100 fL    MCH 31.1 26.0 - 34.0 pg    MCHC 30.7 (L) 32.0 - 36.0 g/dL    RDW 13.4 11.5 - 14.5 %    Platelets 259 150 - 450 x10*3/uL   Basic metabolic panel   Result Value Ref Range    Glucose 76 74 - 99 mg/dL    Sodium 144 136 - 145 mmol/L    Potassium 3.4 (L) 3.5 - 5.3 mmol/L    Chloride 109 (H) 98 - 107 mmol/L    Bicarbonate 24 21 - 32 mmol/L    Anion Gap 14 10 - 20 mmol/L    Urea Nitrogen 19 6 - 23 mg/dL    Creatinine 1.02 0.50 - 1.05 mg/dL    eGFR 58 (L) >60 mL/min/1.73m*2    Calcium 8.9 8.6 - 10.3 mg/dL   Urinalysis with Reflex Culture and Microscopic   Result Value Ref Range    Color, Urine Yellow Straw, Yellow    Appearance, Urine Hazy (N) Clear    Specific Gravity, Urine 1.021 1.005 - 1.035    pH, Urine 5.0 5.0, 5.5, 6.0, 6.5, 7.0, 7.5, 8.0    Protein, Urine 100 (2+) (N) NEGATIVE mg/dL    Glucose, Urine NEGATIVE NEGATIVE mg/dL    Blood, Urine NEGATIVE NEGATIVE    Ketones, Urine 20 (1+) (A) NEGATIVE mg/dL    Bilirubin, Urine NEGATIVE NEGATIVE    Urobilinogen, Urine 2.0 (N) <2.0 mg/dL    Nitrite, Urine NEGATIVE NEGATIVE    Leukocyte Esterase, Urine LARGE (3+) (A) NEGATIVE   Microscopic Only, Urine   Result Value Ref Range    WBC, Urine 11-20 (A) 1-5, NONE /HPF    RBC, Urine 1-2 NONE, 1-2, 3-5 /HPF    Squamous Epithelial Cells, Urine 10-25 (FEW) Reference range not established. /HPF    Bacteria, Urine 1+ (A) NONE SEEN /HPF    Mucus, Urine 1+ Reference range not established. /LPF    Calcium Oxalate Crystals, Urine 3+ (A) NONE, 1+ /HPF    Amorphous Crystals, Urine 1+ NONE, 1+, 2+ /HPF   Urine Culture    Specimen:  Clean Catch/Voided; Urine   Result Value Ref Range    Urine Culture No significant growth    Vitamin B12   Result Value Ref Range    Vitamin B12 1,351 (H) 211 - 911 pg/mL   TSH   Result Value Ref Range    Thyroid Stimulating Hormone 1.16 0.44 - 3.98 mIU/L   Syphilis Screen with Reflex   Result Value Ref Range    Syphilis Total Ab Nonreactive Nonreactive   CBC   Result Value Ref Range    WBC 7.4 4.4 - 11.3 x10*3/uL    nRBC 0.0 0.0 - 0.0 /100 WBCs    RBC 3.83 (L) 4.00 - 5.20 x10*6/uL    Hemoglobin 11.9 (L) 12.0 - 16.0 g/dL    Hematocrit 39.4 36.0 - 46.0 %     (H) 80 - 100 fL    MCH 31.1 26.0 - 34.0 pg    MCHC 30.2 (L) 32.0 - 36.0 g/dL    RDW 13.7 11.5 - 14.5 %    Platelets 270 150 - 450 x10*3/uL   Basic metabolic panel   Result Value Ref Range    Glucose 105 (H) 74 - 99 mg/dL    Sodium 142 136 - 145 mmol/L    Potassium 4.1 3.5 - 5.3 mmol/L    Chloride 109 (H) 98 - 107 mmol/L    Bicarbonate 23 21 - 32 mmol/L    Anion Gap 14 10 - 20 mmol/L    Urea Nitrogen 23 6 - 23 mg/dL    Creatinine 1.19 (H) 0.50 - 1.05 mg/dL    eGFR 48 (L) >60 mL/min/1.73m*2    Calcium 9.4 8.6 - 10.3 mg/dL   Folate   Result Value Ref Range    Folate, Serum >24.0 >5.0 ng/mL   CBC   Result Value Ref Range    WBC 7.8 4.4 - 11.3 x10*3/uL    nRBC 0.0 0.0 - 0.0 /100 WBCs    RBC 3.74 (L) 4.00 - 5.20 x10*6/uL    Hemoglobin 11.8 (L) 12.0 - 16.0 g/dL    Hematocrit 38.0 36.0 - 46.0 %     (H) 80 - 100 fL    MCH 31.6 26.0 - 34.0 pg    MCHC 31.1 (L) 32.0 - 36.0 g/dL    RDW 13.7 11.5 - 14.5 %    Platelets 267 150 - 450 x10*3/uL   Basic metabolic panel   Result Value Ref Range    Glucose 100 (H) 74 - 99 mg/dL    Sodium 144 136 - 145 mmol/L    Potassium 3.7 3.5 - 5.3 mmol/L    Chloride 111 (H) 98 - 107 mmol/L    Bicarbonate 23 21 - 32 mmol/L    Anion Gap 14 10 - 20 mmol/L    Urea Nitrogen 19 6 - 23 mg/dL    Creatinine 1.02 0.50 - 1.05 mg/dL    eGFR 58 (L) >60 mL/min/1.73m*2    Calcium 8.7 8.6 - 10.3 mg/dL   CBC   Result Value Ref Range    WBC 9.4 4.4 -  11.3 x10*3/uL    nRBC 0.0 0.0 - 0.0 /100 WBCs    RBC 4.02 4.00 - 5.20 x10*6/uL    Hemoglobin 12.5 12.0 - 16.0 g/dL    Hematocrit 40.2 36.0 - 46.0 %     80 - 100 fL    MCH 31.1 26.0 - 34.0 pg    MCHC 31.1 (L) 32.0 - 36.0 g/dL    RDW 13.7 11.5 - 14.5 %    Platelets 251 150 - 450 x10*3/uL   Basic Metabolic Panel   Result Value Ref Range    Glucose 100 (H) 74 - 99 mg/dL    Sodium 142 136 - 145 mmol/L    Potassium 4.0 3.5 - 5.3 mmol/L    Chloride 108 (H) 98 - 107 mmol/L    Bicarbonate 25 21 - 32 mmol/L    Anion Gap 13 10 - 20 mmol/L    Urea Nitrogen 15 6 - 23 mg/dL    Creatinine 0.90 0.50 - 1.05 mg/dL    eGFR 68 >60 mL/min/1.73m*2    Calcium 8.9 8.6 - 10.3 mg/dL        Assessment/Plan   Principal Problem:    Altered mental status, unspecified altered mental status type  Active Problems:    Confusion    AMS (altered mental status)    IA/P  Psychotic Disorder  Catatonia   Likely dementia  Possible Depression    PLAN:  Sitter at all times  Restraints per primary service  Start Haldol 0.5 mg q8hrs IM if not taking po  Lorazepam 0.5 mg TID  Haldol 3 mg IM twice daily prn agitation  Add mirtazapine 7.5 mg hs but may not be taking it.   Magic cup or protein pudding until passes swallow (nutrition consult)  May need IV access  Thiamine 500 mg IV q8 hrs x 9 doses  MOCA per OT ordered  Currently pt lacks capacity based on history and current status, this can change.    Pt will need geropsych after she is able to take po and is eating a sustainable amount       I spent 30 minutes in the professional and overall care of this patient.      Ilene Yang MD

## 2024-02-17 NOTE — CARE PLAN
The patient's goals for the shift include pt will have psy consult via zoom    The clinical goals for the shift include is to have remove soft restriants      Problem: Safety - Adult  Goal: Free from fall injury  Outcome: Progressing     Problem: Discharge Planning  Goal: Discharge to home or other facility with appropriate resources  Outcome: Progressing     Problem: Chronic Conditions and Co-morbidities  Goal: Patient's chronic conditions and co-morbidity symptoms are monitored and maintained or improved  Outcome: Progressing     Problem: Skin  Goal: Decreased wound size/increased tissue granulation at next dressing change  Outcome: Progressing     Problem: Skin  Goal: Participates in plan/prevention/treatment measures  Outcome: Progressing       Problem: Skin  Goal: Prevent/minimize sheer/friction injuries  Outcome: Progressing

## 2024-02-17 NOTE — PROGRESS NOTES
Heather Romo is a 73 y.o. female on day 1 of admission presenting with Altered mental status, unspecified altered mental status type.      Subjective   Pt seen and examined.        Objective     Last Recorded Vitals  BP (!) 180/95 (BP Location: Right arm)   Pulse 71   Temp 36.6 °C (97.8 °F) (Temporal)   Resp 17   Wt 74 kg (163 lb 1.6 oz)   SpO2 96%   Intake/Output last 3 Shifts:  No intake or output data in the 24 hours ending 02/17/24 1031      Admission Weight  Weight: 74 kg (163 lb 1.6 oz) (02/13/24 1617)    Daily Weight  02/13/24 : 74 kg (163 lb 1.6 oz)      Physical Exam    Constitutional: No acute distress, awake, alert  Head/Neck: Neck supple  Respiratory/Thorax: Lungs are Clear to auscultation  Cardiovascular: Regular, rate and rhythm,  2+ equal pulses of the extremities, normal S 1and S 2  Gastrointestinal: Nondistended, soft, non-tender, no rebound tenderness or guarding  Extremities: Braces on lower extremities  Neurological: Awake and alert.  confused, no focal neuro deficits  Psychological: Appropriate mood and behavior    Relevant Results  Results for orders placed or performed during the hospital encounter of 02/13/24 (from the past 24 hour(s))   CBC   Result Value Ref Range    WBC 9.4 4.4 - 11.3 x10*3/uL    nRBC 0.0 0.0 - 0.0 /100 WBCs    RBC 4.02 4.00 - 5.20 x10*6/uL    Hemoglobin 12.5 12.0 - 16.0 g/dL    Hematocrit 40.2 36.0 - 46.0 %     80 - 100 fL    MCH 31.1 26.0 - 34.0 pg    MCHC 31.1 (L) 32.0 - 36.0 g/dL    RDW 13.7 11.5 - 14.5 %    Platelets 251 150 - 450 x10*3/uL   Basic Metabolic Panel   Result Value Ref Range    Glucose 100 (H) 74 - 99 mg/dL    Sodium 142 136 - 145 mmol/L    Potassium 4.0 3.5 - 5.3 mmol/L    Chloride 108 (H) 98 - 107 mmol/L    Bicarbonate 25 21 - 32 mmol/L    Anion Gap 13 10 - 20 mmol/L    Urea Nitrogen 15 6 - 23 mg/dL    Creatinine 0.90 0.50 - 1.05 mg/dL    eGFR 68 >60 mL/min/1.73m*2    Calcium 8.9 8.6 - 10.3 mg/dL        CT head wo IV contrast   Final  Result   Similar appearance with advanced chronic small vessel ischemic change   and remote left basal ganglia lacunar infarct. If concern for new   ischemic changes, consider MRI. Degree of volume loss vascular   changes does appear to be advanced for age.        No evidence of intracranial hemorrhage or displaced skull fracture.        MACRO:   None        Signed by: Renny Cast 2/13/2024 9:08 PM   Dictation workstation:   [id number]      XR chest 1 view   Final Result   No airspace consolidation or pleural effusion.        MACRO:   None        Signed by: Kit Woods 2/13/2024 7:56 PM   Dictation workstation:   [id number]      MR brain wo IV contrast    (Results Pending)       Scheduled medications  atorvastatin, 80 mg, oral, Nightly  enoxaparin, 40 mg, subcutaneous, q24h  lisinopril, 10 mg, oral, Daily  melatonin, 3 mg, oral, Daily  sennosides, 2 tablet, oral, BID  thiamine, 500 mg, intravenous, TID      Continuous medications     PRN medications  PRN medications: acetaminophen **OR** acetaminophen **OR** acetaminophen, hydrALAZINE, OLANZapine         Assessment/Plan                  Principal Problem:    Altered mental status, unspecified altered mental status type  Active Problems:    Confusion    AMS (altered mental status)    # Altered mental status  # Alzheimer's dementia?  -no clear etiology  -MRI brain pending  -neurology consult  -PT/OT  -psych consult for capacity eval  -monitor    # UTI  -urine cx negative  -ceftriaxone stopped    # HTN  -continue home meds    # HLD  -continue statin    # DVT ppx              Celsa Leyva MD

## 2024-02-17 NOTE — NURSING NOTE
Pt having elevated /60, IV hydralazine was ordered by the doctor, but this pt does not have an IV access. All attempts to get an IV access were unsuccessful. Patient is agitated and in restraints. Will continue to monitor

## 2024-02-18 PROCEDURE — 2500000001 HC RX 250 WO HCPCS SELF ADMINISTERED DRUGS (ALT 637 FOR MEDICARE OP): Performed by: PSYCHIATRY & NEUROLOGY

## 2024-02-18 PROCEDURE — 2500000004 HC RX 250 GENERAL PHARMACY W/ HCPCS (ALT 636 FOR OP/ED): Performed by: HOSPITALIST

## 2024-02-18 PROCEDURE — 1200000002 HC GENERAL ROOM WITH TELEMETRY DAILY

## 2024-02-18 PROCEDURE — 2500000004 HC RX 250 GENERAL PHARMACY W/ HCPCS (ALT 636 FOR OP/ED): Performed by: PSYCHIATRY & NEUROLOGY

## 2024-02-18 PROCEDURE — 2500000004 HC RX 250 GENERAL PHARMACY W/ HCPCS (ALT 636 FOR OP/ED): Performed by: PHYSICIAN ASSISTANT

## 2024-02-18 PROCEDURE — 2500000001 HC RX 250 WO HCPCS SELF ADMINISTERED DRUGS (ALT 637 FOR MEDICARE OP): Performed by: INTERNAL MEDICINE

## 2024-02-18 PROCEDURE — 99232 SBSQ HOSP IP/OBS MODERATE 35: CPT | Performed by: INTERNAL MEDICINE

## 2024-02-18 PROCEDURE — 2500000001 HC RX 250 WO HCPCS SELF ADMINISTERED DRUGS (ALT 637 FOR MEDICARE OP): Performed by: PHYSICIAN ASSISTANT

## 2024-02-18 RX ORDER — MIRTAZAPINE 15 MG/1
7.5 TABLET, FILM COATED ORAL NIGHTLY
Status: DISCONTINUED | OUTPATIENT
Start: 2024-02-18 | End: 2024-02-24

## 2024-02-18 RX ORDER — HALOPERIDOL 5 MG/ML
1 INJECTION INTRAMUSCULAR EVERY 8 HOURS
Status: DISCONTINUED | OUTPATIENT
Start: 2024-02-18 | End: 2024-02-18

## 2024-02-18 RX ORDER — HALOPERIDOL 5 MG/ML
3 INJECTION INTRAMUSCULAR EVERY 8 HOURS PRN
Status: DISCONTINUED | OUTPATIENT
Start: 2024-02-18 | End: 2024-02-19

## 2024-02-18 RX ORDER — LORAZEPAM 2 MG/ML
0.5 INJECTION INTRAMUSCULAR EVERY 8 HOURS SCHEDULED
Status: DISCONTINUED | OUTPATIENT
Start: 2024-02-18 | End: 2024-02-19

## 2024-02-18 RX ORDER — HALOPERIDOL 5 MG/ML
0.5 INJECTION INTRAMUSCULAR EVERY 8 HOURS
Status: DISCONTINUED | OUTPATIENT
Start: 2024-02-18 | End: 2024-02-19

## 2024-02-18 RX ADMIN — THIAMINE HYDROCHLORIDE 500 MG: 100 INJECTION, SOLUTION INTRAMUSCULAR; INTRAVENOUS at 14:43

## 2024-02-18 RX ADMIN — THIAMINE HYDROCHLORIDE 500 MG: 100 INJECTION, SOLUTION INTRAMUSCULAR; INTRAVENOUS at 21:12

## 2024-02-18 RX ADMIN — Medication 100 MG: at 11:31

## 2024-02-18 RX ADMIN — ATORVASTATIN CALCIUM 80 MG: 80 TABLET, FILM COATED ORAL at 21:10

## 2024-02-18 RX ADMIN — STANDARDIZED SENNA CONCENTRATE 17.2 MG: 8.6 TABLET ORAL at 21:10

## 2024-02-18 RX ADMIN — MIRTAZAPINE 7.5 MG: 15 TABLET, FILM COATED ORAL at 01:26

## 2024-02-18 RX ADMIN — HALOPERIDOL LACTATE 0.5 MG: 5 INJECTION, SOLUTION INTRAMUSCULAR at 10:23

## 2024-02-18 RX ADMIN — LORAZEPAM 0.5 MG: 2 INJECTION INTRAMUSCULAR; INTRAVENOUS at 18:50

## 2024-02-18 RX ADMIN — THIAMINE HYDROCHLORIDE 500 MG: 100 INJECTION, SOLUTION INTRAMUSCULAR; INTRAVENOUS at 09:04

## 2024-02-18 RX ADMIN — HALOPERIDOL LACTATE 0.5 MG: 5 INJECTION, SOLUTION INTRAMUSCULAR at 18:21

## 2024-02-18 RX ADMIN — HALOPERIDOL LACTATE 1 MG: 5 INJECTION, SOLUTION INTRAMUSCULAR at 01:31

## 2024-02-18 RX ADMIN — MIRTAZAPINE 7.5 MG: 15 TABLET, FILM COATED ORAL at 21:10

## 2024-02-18 RX ADMIN — STANDARDIZED SENNA CONCENTRATE 17.2 MG: 8.6 TABLET ORAL at 11:31

## 2024-02-18 RX ADMIN — HYDRALAZINE HYDROCHLORIDE 10 MG: 20 INJECTION INTRAMUSCULAR; INTRAVENOUS at 09:04

## 2024-02-18 RX ADMIN — ENOXAPARIN SODIUM 40 MG: 40 INJECTION SUBCUTANEOUS at 01:34

## 2024-02-18 RX ADMIN — LORAZEPAM 0.5 MG: 2 INJECTION INTRAMUSCULAR; INTRAVENOUS at 01:28

## 2024-02-18 RX ADMIN — LISINOPRIL 10 MG: 10 TABLET ORAL at 11:31

## 2024-02-18 RX ADMIN — Medication 3 MG: at 21:00

## 2024-02-18 ASSESSMENT — COGNITIVE AND FUNCTIONAL STATUS - GENERAL
TURNING FROM BACK TO SIDE WHILE IN FLAT BAD: TOTAL
MOVING FROM LYING ON BACK TO SITTING ON SIDE OF FLAT BED WITH BEDRAILS: TOTAL
HELP NEEDED FOR BATHING: TOTAL
PERSONAL GROOMING: TOTAL
MOVING TO AND FROM BED TO CHAIR: TOTAL
STANDING UP FROM CHAIR USING ARMS: TOTAL
TOILETING: TOTAL
STANDING UP FROM CHAIR USING ARMS: TOTAL
PERSONAL GROOMING: TOTAL
TOILETING: TOTAL
TURNING FROM BACK TO SIDE WHILE IN FLAT BAD: TOTAL
MOVING FROM LYING ON BACK TO SITTING ON SIDE OF FLAT BED WITH BEDRAILS: TOTAL
DRESSING REGULAR UPPER BODY CLOTHING: TOTAL
CLIMB 3 TO 5 STEPS WITH RAILING: TOTAL
EATING MEALS: TOTAL
HELP NEEDED FOR BATHING: TOTAL
EATING MEALS: TOTAL
DRESSING REGULAR LOWER BODY CLOTHING: TOTAL
DRESSING REGULAR UPPER BODY CLOTHING: TOTAL
WALKING IN HOSPITAL ROOM: TOTAL
MOVING TO AND FROM BED TO CHAIR: TOTAL
DRESSING REGULAR LOWER BODY CLOTHING: TOTAL
WALKING IN HOSPITAL ROOM: TOTAL
DAILY ACTIVITIY SCORE: 6
MOBILITY SCORE: 6
CLIMB 3 TO 5 STEPS WITH RAILING: TOTAL

## 2024-02-18 ASSESSMENT — PAIN - FUNCTIONAL ASSESSMENT
PAIN_FUNCTIONAL_ASSESSMENT: 0-10

## 2024-02-18 ASSESSMENT — PAIN SCALES - GENERAL
PAINLEVEL_OUTOF10: 0 - NO PAIN

## 2024-02-18 NOTE — PROGRESS NOTES
Heather Romo is a 73 y.o. female on day 1 of admission presenting with Altered mental status, unspecified altered mental status type.      Subjective   Pt seen and examined.        Objective     Last Recorded Vitals  /54 (BP Location: Left arm, Patient Position: Lying)   Pulse 100   Temp 36.6 °C (97.8 °F) (Temporal)   Resp 18   Wt 74 kg (163 lb 1.6 oz)   SpO2 97%   Intake/Output last 3 Shifts:  No intake or output data in the 24 hours ending 02/18/24 1139      Admission Weight  Weight: 74 kg (163 lb 1.6 oz) (02/13/24 1617)    Daily Weight  02/13/24 : 74 kg (163 lb 1.6 oz)      Physical Exam    Constitutional: No acute distress, awake, alert  Head/Neck: Neck supple  Respiratory/Thorax: Lungs are Clear to auscultation  Cardiovascular: Regular, rate and rhythm,  2+ equal pulses of the extremities, normal S 1and S 2  Gastrointestinal: Nondistended, soft, non-tender, no rebound tenderness or guarding  Extremities: Braces on lower extremities  Neurological: Awake and alert.  confused, no focal neuro deficits  Psychological: Appropriate mood and behavior    Relevant Results  No results found for this or any previous visit (from the past 24 hour(s)).       CT head wo IV contrast   Final Result   Similar appearance with advanced chronic small vessel ischemic change   and remote left basal ganglia lacunar infarct. If concern for new   ischemic changes, consider MRI. Degree of volume loss vascular   changes does appear to be advanced for age.        No evidence of intracranial hemorrhage or displaced skull fracture.        MACRO:   None        Signed by: Renny Cast 2/13/2024 9:08 PM   Dictation workstation:   [id number]      XR chest 1 view   Final Result   No airspace consolidation or pleural effusion.        MACRO:   None        Signed by: Kit Woods 2/13/2024 7:56 PM   Dictation workstation:   [id number]      MR brain wo IV contrast    (Results Pending)       Scheduled medications  atorvastatin,  80 mg, oral, Nightly  enoxaparin, 40 mg, subcutaneous, q24h  haloperidol lactate, 0.5 mg, intravenous, q8h  lisinopril, 10 mg, oral, Daily  LORazepam, 0.5 mg, intravenous, q8h RAFFAELE  melatonin, 3 mg, oral, Daily  mirtazapine, 7.5 mg, oral, Nightly  sennosides, 2 tablet, oral, BID  thiamine, 100 mg, oral, Daily  thiamine, 500 mg, intravenous, TID      Continuous medications     PRN medications  PRN medications: acetaminophen **OR** acetaminophen **OR** acetaminophen, haloperidol lactate, hydrALAZINE, hydrALAZINE         Assessment/Plan                  Principal Problem:    Altered mental status, unspecified altered mental status type  Active Problems:    Confusion    AMS (altered mental status)    # Altered mental status  # Alzheimer's dementia?  # Psychotic disorder  # Catatonia  -MRI brain pending  -neurology consult  -PT/OT  -psych consult  -sitter   -monitor    # UTI  -urine cx negative  -ceftriaxone stopped    # HTN  -continue home meds    # HLD  -continue statin    # DVT ppx              Celsa Leyva MD

## 2024-02-18 NOTE — NURSING NOTE
73 years old Pt. Admitted to the hospital presenting altered mental status and confusion. Pt is alert X1 (name).  This student nurse obtained vital signs: /57 (lying LA), Temp 96.1(fahrenheit), Pulse 98, Spo2 95% (RA), pt denied any pain at this time. Pt has soft restraint on bilateral wrist, neurovascular intact, safety maintained, and pt is resting on her bed at this time. Pt was showing s/sx of infiltration (red, warm) on her iv site (left forearm), and IV removed. Pt now stated a new IV on R forearm. Iv site is dry, clean and intact.  Pt is on cardiac telemetry.

## 2024-02-18 NOTE — PROGRESS NOTES
Occupational Therapy                 Therapy Communication Note    Patient Name: Heather Romo  MRN: [id number]  Today's Date: 2/18/2024     Discipline: Occupational Therapy    Missed Visit Reason: Missed Visit Reason: Other (Comment) (OT MoCA requested-- Attempted MoCA on 2/16 though pt was unable to follow commands enough to complete. Dr. Yang aware, discussed findings, per Dr. Yang okay to defer MoCA at this time as pt is not appropriate, will re-attempt as pt able to participate)    Missed Time: Attempt

## 2024-02-18 NOTE — SIGNIFICANT EVENT
Currently appropriate to continue soft restraints; while in restraints not necessary for 1:1 skilled sitter.

## 2024-02-19 ENCOUNTER — APPOINTMENT (OUTPATIENT)
Dept: CARDIOLOGY | Facility: HOSPITAL | Age: 74
DRG: 056 | End: 2024-02-19
Payer: MEDICARE

## 2024-02-19 ENCOUNTER — APPOINTMENT (OUTPATIENT)
Dept: RADIOLOGY | Facility: HOSPITAL | Age: 74
DRG: 056 | End: 2024-02-19
Payer: MEDICARE

## 2024-02-19 LAB
ANION GAP SERPL CALC-SCNC: 15 MMOL/L (ref 10–20)
BUN SERPL-MCNC: 19 MG/DL (ref 6–23)
CALCIUM SERPL-MCNC: 8.7 MG/DL (ref 8.6–10.3)
CHLORIDE SERPL-SCNC: 107 MMOL/L (ref 98–107)
CO2 SERPL-SCNC: 23 MMOL/L (ref 21–32)
CREAT SERPL-MCNC: 1.25 MG/DL (ref 0.5–1.05)
EGFRCR SERPLBLD CKD-EPI 2021: 46 ML/MIN/1.73M*2
ERYTHROCYTE [DISTWIDTH] IN BLOOD BY AUTOMATED COUNT: 13.8 % (ref 11.5–14.5)
GLUCOSE SERPL-MCNC: 89 MG/DL (ref 74–99)
HCT VFR BLD AUTO: 41.2 % (ref 36–46)
HGB BLD-MCNC: 11.8 G/DL (ref 12–16)
MCH RBC QN AUTO: 30.8 PG (ref 26–34)
MCHC RBC AUTO-ENTMCNC: 28.6 G/DL (ref 32–36)
MCV RBC AUTO: 108 FL (ref 80–100)
NRBC BLD-RTO: 0 /100 WBCS (ref 0–0)
PLATELET # BLD AUTO: 262 X10*3/UL (ref 150–450)
POTASSIUM SERPL-SCNC: 4.9 MMOL/L (ref 3.5–5.3)
RBC # BLD AUTO: 3.83 X10*6/UL (ref 4–5.2)
SODIUM SERPL-SCNC: 140 MMOL/L (ref 136–145)
WBC # BLD AUTO: 7.4 X10*3/UL (ref 4.4–11.3)

## 2024-02-19 PROCEDURE — 1200000002 HC GENERAL ROOM WITH TELEMETRY DAILY

## 2024-02-19 PROCEDURE — 2500000004 HC RX 250 GENERAL PHARMACY W/ HCPCS (ALT 636 FOR OP/ED): Performed by: PHYSICIAN ASSISTANT

## 2024-02-19 PROCEDURE — 99233 SBSQ HOSP IP/OBS HIGH 50: CPT | Performed by: PSYCHIATRY & NEUROLOGY

## 2024-02-19 PROCEDURE — 70551 MRI BRAIN STEM W/O DYE: CPT | Performed by: RADIOLOGY

## 2024-02-19 PROCEDURE — 99232 SBSQ HOSP IP/OBS MODERATE 35: CPT | Performed by: INTERNAL MEDICINE

## 2024-02-19 PROCEDURE — 93005 ELECTROCARDIOGRAM TRACING: CPT

## 2024-02-19 PROCEDURE — 80048 BASIC METABOLIC PNL TOTAL CA: CPT | Performed by: INTERNAL MEDICINE

## 2024-02-19 PROCEDURE — 36415 COLL VENOUS BLD VENIPUNCTURE: CPT | Performed by: INTERNAL MEDICINE

## 2024-02-19 PROCEDURE — 70551 MRI BRAIN STEM W/O DYE: CPT

## 2024-02-19 PROCEDURE — 2500000001 HC RX 250 WO HCPCS SELF ADMINISTERED DRUGS (ALT 637 FOR MEDICARE OP): Performed by: INTERNAL MEDICINE

## 2024-02-19 PROCEDURE — 85027 COMPLETE CBC AUTOMATED: CPT | Performed by: INTERNAL MEDICINE

## 2024-02-19 PROCEDURE — 2500000001 HC RX 250 WO HCPCS SELF ADMINISTERED DRUGS (ALT 637 FOR MEDICARE OP): Performed by: PSYCHIATRY & NEUROLOGY

## 2024-02-19 PROCEDURE — 97530 THERAPEUTIC ACTIVITIES: CPT | Mod: GP,CQ

## 2024-02-19 PROCEDURE — 2500000004 HC RX 250 GENERAL PHARMACY W/ HCPCS (ALT 636 FOR OP/ED): Performed by: PSYCHIATRY & NEUROLOGY

## 2024-02-19 PROCEDURE — 2500000001 HC RX 250 WO HCPCS SELF ADMINISTERED DRUGS (ALT 637 FOR MEDICARE OP): Performed by: PHYSICIAN ASSISTANT

## 2024-02-19 RX ORDER — HALOPERIDOL 5 MG/ML
1 INJECTION INTRAMUSCULAR EVERY 8 HOURS PRN
Status: DISCONTINUED | OUTPATIENT
Start: 2024-02-19 | End: 2024-02-27 | Stop reason: HOSPADM

## 2024-02-19 RX ADMIN — MIRTAZAPINE 7.5 MG: 15 TABLET, FILM COATED ORAL at 21:33

## 2024-02-19 RX ADMIN — THIAMINE HYDROCHLORIDE 500 MG: 100 INJECTION, SOLUTION INTRAMUSCULAR; INTRAVENOUS at 09:59

## 2024-02-19 RX ADMIN — HALOPERIDOL LACTATE 0.5 MG: 5 INJECTION, SOLUTION INTRAMUSCULAR at 09:55

## 2024-02-19 RX ADMIN — LORAZEPAM 0.5 MG: 2 INJECTION INTRAMUSCULAR; INTRAVENOUS at 03:44

## 2024-02-19 RX ADMIN — ATORVASTATIN CALCIUM 80 MG: 80 TABLET, FILM COATED ORAL at 21:33

## 2024-02-19 RX ADMIN — HALOPERIDOL LACTATE 0.5 MG: 5 INJECTION, SOLUTION INTRAMUSCULAR at 02:05

## 2024-02-19 RX ADMIN — Medication 3 MG: at 21:33

## 2024-02-19 RX ADMIN — STANDARDIZED SENNA CONCENTRATE 17.2 MG: 8.6 TABLET ORAL at 21:33

## 2024-02-19 RX ADMIN — THIAMINE HYDROCHLORIDE 500 MG: 100 INJECTION, SOLUTION INTRAMUSCULAR; INTRAVENOUS at 15:57

## 2024-02-19 RX ADMIN — ENOXAPARIN SODIUM 40 MG: 40 INJECTION SUBCUTANEOUS at 01:21

## 2024-02-19 ASSESSMENT — COGNITIVE AND FUNCTIONAL STATUS - GENERAL
HELP NEEDED FOR BATHING: TOTAL
DAILY ACTIVITIY SCORE: 6
STANDING UP FROM CHAIR USING ARMS: TOTAL
MOVING TO AND FROM BED TO CHAIR: TOTAL
MOVING FROM LYING ON BACK TO SITTING ON SIDE OF FLAT BED WITH BEDRAILS: TOTAL
STANDING UP FROM CHAIR USING ARMS: TOTAL
DRESSING REGULAR LOWER BODY CLOTHING: TOTAL
DRESSING REGULAR UPPER BODY CLOTHING: TOTAL
MOBILITY SCORE: 6
TOILETING: TOTAL
TURNING FROM BACK TO SIDE WHILE IN FLAT BAD: TOTAL
MOVING FROM LYING ON BACK TO SITTING ON SIDE OF FLAT BED WITH BEDRAILS: TOTAL
WALKING IN HOSPITAL ROOM: TOTAL
CLIMB 3 TO 5 STEPS WITH RAILING: TOTAL
EATING MEALS: TOTAL
TURNING FROM BACK TO SIDE WHILE IN FLAT BAD: TOTAL
MOVING TO AND FROM BED TO CHAIR: TOTAL
WALKING IN HOSPITAL ROOM: TOTAL
CLIMB 3 TO 5 STEPS WITH RAILING: TOTAL
PERSONAL GROOMING: TOTAL
MOBILITY SCORE: 6

## 2024-02-19 ASSESSMENT — PAIN SCALES - GENERAL
PAINLEVEL_OUTOF10: 0 - NO PAIN
PAINLEVEL_OUTOF10: 0 - NO PAIN

## 2024-02-19 ASSESSMENT — PAIN - FUNCTIONAL ASSESSMENT
PAIN_FUNCTIONAL_ASSESSMENT: 0-10
PAIN_FUNCTIONAL_ASSESSMENT: 0-10

## 2024-02-19 NOTE — CARE PLAN
Problem: Safety - Medical Restraint  Goal: Remains free of injury from restraints (Restraint for Interference with Medical Device)  Outcome: Not Progressing  Goal: Free from restraint(s) (Restraint for Interference with Medical Device)  Outcome: Not Progressing

## 2024-02-19 NOTE — PROGRESS NOTES
02/19/24 1428   Discharge Planning   Support Systems Family members  (sister Marlene Ruiz [phone])   Home or Post Acute Services Post acute facilities (Rehab/SNF/etc)  (psychiatry recommendations vs private pay at [facility])   Type of Post Acute Facility Services   (skilled facility vs psychiatry recommendations)   Patient expects to be discharged to: [facility] private pay vs psychiatry recommendations   Does the patient need discharge transport arranged? Yes   RoundTrip coordination needed? Yes   Has discharge transport been arranged? Yes     Brain MRI pending

## 2024-02-19 NOTE — PROGRESS NOTES
"Heather Romo is a 73 y.o. female on day 3 of admission presenting with Altered mental status, unspecified altered mental status type.    Subjective   Pt rousable but falls back to sleep   Restless overnight  No longer appears to be hallucinating, just sedated, has not been able to receive OT/PT or MOCA.     Sister gives history of disordered finances x more than a year, pt would not allow sister in house until 6 months ago.     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     Will change haldol to PRN  Wake pt up  Primary goal is eating, wakefulness and ability to move.   Will try and assess psych condition in hospital, adjust meds, so she can go straight to rehab instead of geropsych.        Objective     Physical Exam  Psychiatric:         Attention and Perception: She is inattentive.         Speech: Speech is delayed.         Behavior: Behavior is cooperative.         Cognition and Memory: Cognition is impaired.         Judgment: Judgment is impulsive.      Comments: Pt sleeping, rousable, responds to command to say hi then falls back asleep       Last Recorded Vitals  Blood pressure 128/52, pulse 88, temperature 36.6 °C (97.9 °F), temperature source Temporal, resp. rate 18, height 1.6 m (5' 3\"), weight 74 kg (163 lb 1.6 oz), SpO2 95 %.  Intake/Output last 3 Shifts:  No intake/output data recorded.    Relevant Results  Scheduled medications  atorvastatin, 80 mg, oral, Nightly  enoxaparin, 40 mg, subcutaneous, q24h  lisinopril, 10 mg, oral, Daily  LORazepam, 0.5 mg, intravenous, q8h RAFFAELE  melatonin, 3 mg, oral, Daily  mirtazapine, 7.5 mg, oral, Nightly  sennosides, 2 tablet, oral, BID  thiamine, 100 mg, oral, Daily  thiamine, 500 mg, intravenous, TID      PRN medications: acetaminophen **OR** acetaminophen **OR** acetaminophen, haloperidol lactate, haloperidol lactate, hydrALAZINE, hydrALAZINE    Results for orders placed or performed during the hospital encounter of 02/13/24 (from the " past 24 hour(s))   CBC   Result Value Ref Range    WBC 7.4 4.4 - 11.3 x10*3/uL    nRBC 0.0 0.0 - 0.0 /100 WBCs    RBC 3.83 (L) 4.00 - 5.20 x10*6/uL    Hemoglobin 11.8 (L) 12.0 - 16.0 g/dL    Hematocrit 41.2 36.0 - 46.0 %     (H) 80 - 100 fL    MCH 30.8 26.0 - 34.0 pg    MCHC 28.6 (L) 32.0 - 36.0 g/dL    RDW 13.8 11.5 - 14.5 %    Platelets 262 150 - 450 x10*3/uL   Basic Metabolic Panel   Result Value Ref Range    Glucose 89 74 - 99 mg/dL    Sodium 140 136 - 145 mmol/L    Potassium 4.9 3.5 - 5.3 mmol/L    Chloride 107 98 - 107 mmol/L    Bicarbonate 23 21 - 32 mmol/L    Anion Gap 15 10 - 20 mmol/L    Urea Nitrogen 19 6 - 23 mg/dL    Creatinine 1.25 (H) 0.50 - 1.05 mg/dL    eGFR 46 (L) >60 mL/min/1.73m*2    Calcium 8.7 8.6 - 10.3 mg/dL      Assessment/Plan   Principal Problem:    Altered mental status, unspecified altered mental status type  Active Problems:    Confusion    AMS (altered mental status)    A/P  Psychotic Disorder  Catatonia appears resolved, now just sedated  Dementia unquantified  Possible Depression    PLAN:  Restraints per primary service  Change Haldol 1 mg q8hrs IV PRN agitation can give IM if no IV  Dc Lorazepam   mirtazapine 7.5 mg hs but may not be taking po,  Melatonin 3 mg hs  Magic cup or protein pudding until passes swallow (nutrition consult)       I spent 30 minutes in the professional and overall care of this patient.      Ilene Yang MD

## 2024-02-19 NOTE — PROGRESS NOTES
Physical Therapy    Physical Therapy Treatment    Patient Name: Heather Romo  MRN: [id number]  Today's Date: 2/19/2024  Time Calculation  Start Time: 1326  Stop Time: 1342  Time Calculation (min): 16 min       Assessment/Plan      PT Plan  Inpatient/Swing Bed or Outpatient: Inpatient  PT Plan  Treatment/Interventions: Bed mobility, Transfer training, Gait training, Stair training, Balance training, Strengthening, Range of motion, Endurance training, Therapeutic exercise, Therapeutic activity, Home exercise program  PT Plan: Skilled PT  PT Frequency: 3 times per week  PT Discharge Recommendations: Moderate intensity level of continued care  Equipment Recommended upon Discharge: Wheeled walker  PT Recommended Transfer Status: Assist x1  PT - OK to Discharge: Yes (per POC)      General Visit Information:   PT  Visit  PT Received On: 02/19/24  General  Reason for Referral: decline in ADLs. AMS  Past Medical History Relevant to Rehab: see medical chart  Family/Caregiver Present: Yes (sister (Renae))  Prior to Session Communication: Bedside nurse (RN cleared pt. for PT Tx. All charts reviewed. Per handoff with nursing prior to therapy visit, patient’s pain and vitals are stable. Additional concern for this patient related to therapy session: RN removed restraints prior to PT tx.)  Patient Position Received: Bed, 4 rail up, Alarm on  General Comment: Pt unable to be aroused for PT tx. RN reporting pt. is awake during night and was given Haldol this AM.    Subjective   Precautions:  Precautions  Medical Precautions: Fall precautions  Vital Signs:       Objective   Pain:  Pain Assessment  Pain Assessment: 0-10  Pain Score: 0 - No pain    Treatments:  Therapeutic Activity  Therapeutic Activity Performed: Yes  Therapeutic Activity 1: Pt unable to be aroused for PT tx (attempted to arouse with sternal rub and repeated auditory cuing). Pt presented in supine with B solid ankle AFOs. Sister reporting they have been leaving  them on 24 hours. This therapist provided pt. education to sister regarding concern for skin break down with prolonged use of AFO greater than 8 hour durations and with use in bed. This therapist removed AFOs and applied prevalon heel protectors. Skin breakdown noted at posterior upper leg and reported findings to RN.    Outcome Measures:  [facility] Basic Mobility  Turning from your back to your side while in a flat bed without using bedrails: Total  Moving from lying on your back to sitting on the side of a flat bed without using bedrails: Total  Moving to and from bed to chair (including a wheelchair): Total  Standing up from a chair using your arms (e.g. wheelchair or bedside chair): Total  To walk in hospital room: Total  Climbing 3-5 steps with railing: Total  Basic Mobility - Total Score: 6    Education Documentation  Body Mechanics, taught by Ashley Elmore PTA at 2/19/2024  2:12 PM.  Learner: Patient  Readiness: Nonacceptance  Method: Explanation  Response: Needs Reinforcement, No Evidence of Learning    Mobility Training, taught by Ashley Elmore PTA at 2/19/2024  2:12 PM.  Learner: Patient  Readiness: Nonacceptance  Method: Explanation  Response: Needs Reinforcement, No Evidence of Learning    Education Comments  No comments found.        OP EDUCATION:       Encounter Problems       Encounter Problems (Active)       Balance       complete all mobility with normal balance while dual tasking, negotiating in a dynamic environment, carrying items, etc., with proactive and reactive static and dynamic standing and sitting tasks, with mod I and a RW, >15 minutes. (Not Progressing)       Start:  02/14/24    Expected End:  02/21/24               Mobility       STG - Patient will ambulate 150 ft mod I using LRAD.  (Not Progressing)       Start:  02/14/24    Expected End:  02/21/24            STG - Patient will ascend and descend a flight of stairs using surjit Hrs mod I.  (Not Progressing)       Start:  02/14/24    Expected  End:  02/21/24               Pain - Adult          Transfers       STG - Patient will perform bed mobility independently.  (Not Progressing)       Start:  02/14/24    Expected End:  02/21/24            STG - Patient will transfer sit to and from stand mod I with LRAD. (Not Progressing)       Start:  02/14/24    Expected End:  02/21/24

## 2024-02-19 NOTE — CARE PLAN
The patient's goals for the shift include Sleep    The clinical goals for the shift include Improve mentation      Problem: Skin  Goal: Decreased wound size/increased tissue granulation at next dressing change  Outcome: Progressing  Goal: Participates in plan/prevention/treatment measures  Outcome: Progressing  Goal: Prevent/manage excess moisture  Outcome: Progressing  Goal: Prevent/minimize sheer/friction injuries  Outcome: Progressing  Goal: Promote/optimize nutrition  2/19/2024 0604 by Enoc Billingsley RN  Flowsheets (Taken 2/19/2024 0604)  Promote/optimize nutrition: Assist with feeding  2/18/2024 6753 by Enoc Billingsley RN  Outcome: Progressing  Goal: Promote skin healing  Outcome: Progressing

## 2024-02-19 NOTE — PROGRESS NOTES
Heather Romo is a 73 y.o. female on day 3 of admission presenting with Altered mental status, unspecified altered mental status type.      Subjective   Pt seen and examined.        Objective     Last Recorded Vitals  BP (!) 133/47 (BP Location: Right arm, Patient Position: Lying)   Pulse 79   Temp 36.4 °C (97.5 °F) (Temporal)   Resp 18   Wt 74 kg (163 lb 1.6 oz)   SpO2 96%   Intake/Output last 3 Shifts:  No intake or output data in the 24 hours ending 02/19/24 1143      Admission Weight  Weight: 74 kg (163 lb 1.6 oz) (02/13/24 1617)    Daily Weight  02/13/24 : 74 kg (163 lb 1.6 oz)      Physical Exam    Constitutional: No acute distress, awake, alert  Head/Neck: Neck supple  Respiratory/Thorax: Lungs are Clear to auscultation  Cardiovascular: Regular, rate and rhythm,  2+ equal pulses of the extremities, normal S 1and S 2  Gastrointestinal: Nondistended, soft, non-tender, no rebound tenderness or guarding  Extremities: Braces on lower extremities  Neurological: Awake and alert.  confused, no focal neuro deficits  Psychological: Appropriate mood and behavior    Relevant Results  Results for orders placed or performed during the hospital encounter of 02/13/24 (from the past 24 hour(s))   CBC   Result Value Ref Range    WBC 7.4 4.4 - 11.3 x10*3/uL    nRBC 0.0 0.0 - 0.0 /100 WBCs    RBC 3.83 (L) 4.00 - 5.20 x10*6/uL    Hemoglobin 11.8 (L) 12.0 - 16.0 g/dL    Hematocrit 41.2 36.0 - 46.0 %     (H) 80 - 100 fL    MCH 30.8 26.0 - 34.0 pg    MCHC 28.6 (L) 32.0 - 36.0 g/dL    RDW 13.8 11.5 - 14.5 %    Platelets 262 150 - 450 x10*3/uL   Basic Metabolic Panel   Result Value Ref Range    Glucose 89 74 - 99 mg/dL    Sodium 140 136 - 145 mmol/L    Potassium 4.9 3.5 - 5.3 mmol/L    Chloride 107 98 - 107 mmol/L    Bicarbonate 23 21 - 32 mmol/L    Anion Gap 15 10 - 20 mmol/L    Urea Nitrogen 19 6 - 23 mg/dL    Creatinine 1.25 (H) 0.50 - 1.05 mg/dL    eGFR 46 (L) >60 mL/min/1.73m*2    Calcium 8.7 8.6 - 10.3 mg/dL           CT head wo IV contrast   Final Result   Similar appearance with advanced chronic small vessel ischemic change   and remote left basal ganglia lacunar infarct. If concern for new   ischemic changes, consider MRI. Degree of volume loss vascular   changes does appear to be advanced for age.        No evidence of intracranial hemorrhage or displaced skull fracture.        MACRO:   None        Signed by: Renny Cast 2/13/2024 9:08 PM   Dictation workstation:   [id number]      XR chest 1 view   Final Result   No airspace consolidation or pleural effusion.        MACRO:   None        Signed by: Kit Woods 2/13/2024 7:56 PM   Dictation workstation:   [id number]      MR brain wo IV contrast    (Results Pending)       Scheduled medications  atorvastatin, 80 mg, oral, Nightly  enoxaparin, 40 mg, subcutaneous, q24h  haloperidol lactate, 0.5 mg, intravenous, q8h  lisinopril, 10 mg, oral, Daily  LORazepam, 0.5 mg, intravenous, q8h RAFFAELE  melatonin, 3 mg, oral, Daily  mirtazapine, 7.5 mg, oral, Nightly  sennosides, 2 tablet, oral, BID  thiamine, 100 mg, oral, Daily  thiamine, 500 mg, intravenous, TID      Continuous medications     PRN medications  PRN medications: acetaminophen **OR** acetaminophen **OR** acetaminophen, haloperidol lactate, hydrALAZINE, hydrALAZINE         Assessment/Plan                  Principal Problem:    Altered mental status, unspecified altered mental status type  Active Problems:    Confusion    AMS (altered mental status)    # Altered mental status  # Alzheimer's dementia?  # Psychotic disorder  # Catatonia  -MRI brain pending  -neurology consult  -psych consult  -pt in soft restraints  -pending further psych recommendations  -monitor    # UTI  -urine cx negative  -ceftriaxone stopped    # HTN  -continue home meds    # HLD  -continue statin    # DVT ppx              Celsa Leyva MD

## 2024-02-19 NOTE — CARE PLAN
The patient's goals for the shift include pt will have psy consult via zoom    The clinical goals for the shift include Patient will remain safe and HD stable    Over the shift, the patient did not make progress toward the following goals. Barriers to progression include chronic confused mentation.

## 2024-02-20 LAB
ANION GAP SERPL CALC-SCNC: 15 MMOL/L (ref 10–20)
BUN SERPL-MCNC: 25 MG/DL (ref 6–23)
CALCIUM SERPL-MCNC: 9 MG/DL (ref 8.6–10.3)
CHLORIDE SERPL-SCNC: 106 MMOL/L (ref 98–107)
CO2 SERPL-SCNC: 24 MMOL/L (ref 21–32)
CREAT SERPL-MCNC: 1.33 MG/DL (ref 0.5–1.05)
EGFRCR SERPLBLD CKD-EPI 2021: 42 ML/MIN/1.73M*2
ERYTHROCYTE [DISTWIDTH] IN BLOOD BY AUTOMATED COUNT: 13.5 % (ref 11.5–14.5)
GLUCOSE SERPL-MCNC: 119 MG/DL (ref 74–99)
HCT VFR BLD AUTO: 38.4 % (ref 36–46)
HGB BLD-MCNC: 12.2 G/DL (ref 12–16)
MCH RBC QN AUTO: 30.9 PG (ref 26–34)
MCHC RBC AUTO-ENTMCNC: 31.8 G/DL (ref 32–36)
MCV RBC AUTO: 97 FL (ref 80–100)
NRBC BLD-RTO: 0 /100 WBCS (ref 0–0)
PLATELET # BLD AUTO: 333 X10*3/UL (ref 150–450)
POTASSIUM SERPL-SCNC: 4.1 MMOL/L (ref 3.5–5.3)
RBC # BLD AUTO: 3.95 X10*6/UL (ref 4–5.2)
SODIUM SERPL-SCNC: 141 MMOL/L (ref 136–145)
WBC # BLD AUTO: 10.8 X10*3/UL (ref 4.4–11.3)

## 2024-02-20 PROCEDURE — 97112 NEUROMUSCULAR REEDUCATION: CPT | Mod: GO

## 2024-02-20 PROCEDURE — 2500000001 HC RX 250 WO HCPCS SELF ADMINISTERED DRUGS (ALT 637 FOR MEDICARE OP): Performed by: INTERNAL MEDICINE

## 2024-02-20 PROCEDURE — 36415 COLL VENOUS BLD VENIPUNCTURE: CPT | Performed by: INTERNAL MEDICINE

## 2024-02-20 PROCEDURE — 1200000002 HC GENERAL ROOM WITH TELEMETRY DAILY

## 2024-02-20 PROCEDURE — 82374 ASSAY BLOOD CARBON DIOXIDE: CPT | Performed by: INTERNAL MEDICINE

## 2024-02-20 PROCEDURE — 85027 COMPLETE CBC AUTOMATED: CPT | Performed by: INTERNAL MEDICINE

## 2024-02-20 PROCEDURE — 2500000001 HC RX 250 WO HCPCS SELF ADMINISTERED DRUGS (ALT 637 FOR MEDICARE OP): Performed by: PSYCHIATRY & NEUROLOGY

## 2024-02-20 PROCEDURE — 2500000004 HC RX 250 GENERAL PHARMACY W/ HCPCS (ALT 636 FOR OP/ED): Performed by: PHYSICIAN ASSISTANT

## 2024-02-20 PROCEDURE — 2500000001 HC RX 250 WO HCPCS SELF ADMINISTERED DRUGS (ALT 637 FOR MEDICARE OP): Performed by: PHYSICIAN ASSISTANT

## 2024-02-20 PROCEDURE — 99232 SBSQ HOSP IP/OBS MODERATE 35: CPT | Performed by: INTERNAL MEDICINE

## 2024-02-20 PROCEDURE — 99233 SBSQ HOSP IP/OBS HIGH 50: CPT | Performed by: PSYCHIATRY & NEUROLOGY

## 2024-02-20 RX ADMIN — ENOXAPARIN SODIUM 40 MG: 40 INJECTION SUBCUTANEOUS at 00:27

## 2024-02-20 RX ADMIN — LISINOPRIL 10 MG: 10 TABLET ORAL at 09:55

## 2024-02-20 RX ADMIN — STANDARDIZED SENNA CONCENTRATE 17.2 MG: 8.6 TABLET ORAL at 09:56

## 2024-02-20 RX ADMIN — Medication 100 MG: at 09:55

## 2024-02-20 RX ADMIN — ENOXAPARIN SODIUM 40 MG: 40 INJECTION SUBCUTANEOUS at 23:37

## 2024-02-20 ASSESSMENT — COGNITIVE AND FUNCTIONAL STATUS - GENERAL
TURNING FROM BACK TO SIDE WHILE IN FLAT BAD: A LITTLE
CLIMB 3 TO 5 STEPS WITH RAILING: TOTAL
DRESSING REGULAR UPPER BODY CLOTHING: A LOT
MOVING TO AND FROM BED TO CHAIR: TOTAL
DAILY ACTIVITIY SCORE: 10
EATING MEALS: A LOT
DRESSING REGULAR LOWER BODY CLOTHING: A LOT
TURNING FROM BACK TO SIDE WHILE IN FLAT BAD: A LOT
DRESSING REGULAR UPPER BODY CLOTHING: TOTAL
DAILY ACTIVITIY SCORE: 6
MOVING TO AND FROM BED TO CHAIR: A LOT
TOILETING: TOTAL
STANDING UP FROM CHAIR USING ARMS: A LITTLE
PERSONAL GROOMING: A LOT
EATING MEALS: TOTAL
DRESSING REGULAR UPPER BODY CLOTHING: TOTAL
DRESSING REGULAR LOWER BODY CLOTHING: TOTAL
MOVING TO AND FROM BED TO CHAIR: A LITTLE
CLIMB 3 TO 5 STEPS WITH RAILING: TOTAL
WALKING IN HOSPITAL ROOM: TOTAL
MOVING FROM LYING ON BACK TO SITTING ON SIDE OF FLAT BED WITH BEDRAILS: TOTAL
TURNING FROM BACK TO SIDE WHILE IN FLAT BAD: TOTAL
DRESSING REGULAR UPPER BODY CLOTHING: A LOT
HELP NEEDED FOR BATHING: TOTAL
STANDING UP FROM CHAIR USING ARMS: A LOT
MOBILITY SCORE: 11
PERSONAL GROOMING: TOTAL
WALKING IN HOSPITAL ROOM: A LITTLE
MOBILITY SCORE: 19
TOILETING: TOTAL
PERSONAL GROOMING: TOTAL
HELP NEEDED FOR BATHING: TOTAL
DRESSING REGULAR LOWER BODY CLOTHING: TOTAL
DRESSING REGULAR LOWER BODY CLOTHING: TOTAL
DAILY ACTIVITIY SCORE: 11
TOILETING: A LOT
HELP NEEDED FOR BATHING: A LOT
PERSONAL GROOMING: A LOT
HELP NEEDED FOR BATHING: A LOT
TOILETING: TOTAL
WALKING IN HOSPITAL ROOM: TOTAL
MOVING FROM LYING ON BACK TO SITTING ON SIDE OF FLAT BED WITH BEDRAILS: A LITTLE
MOBILITY SCORE: 6
EATING MEALS: TOTAL
EATING MEALS: TOTAL
DAILY ACTIVITIY SCORE: 6
CLIMB 3 TO 5 STEPS WITH RAILING: A LITTLE
STANDING UP FROM CHAIR USING ARMS: TOTAL

## 2024-02-20 ASSESSMENT — PAIN SCALES - GENERAL
PAINLEVEL_OUTOF10: 0 - NO PAIN
PAINLEVEL_OUTOF10: 0 - NO PAIN

## 2024-02-20 ASSESSMENT — PAIN SCALES - PAIN ASSESSMENT IN ADVANCED DEMENTIA (PAINAD)
FACIALEXPRESSION: SMILING OR INEXPRESSIVE
BODYLANGUAGE: TENSE, DISTRESSED PACING, FIDGETING
BREATHING: NORMAL
TOTALSCORE: 1
CONSOLABILITY: NO NEED TO CONSOLE

## 2024-02-20 ASSESSMENT — PAIN - FUNCTIONAL ASSESSMENT: PAIN_FUNCTIONAL_ASSESSMENT: 0-10

## 2024-02-20 NOTE — CARE PLAN
2:29 pm     2-20-24    KAREN  gave pt's  sister an IMM. Pt's sister  expressed confusion  about plan - geropsych v rehab. SW  spoke with  [facility] who  said that  team was  trying to  get  an [employer]  precert for pt for rehab. Pt would not be  [place]  yet.  Sister updated, also told that a  message  was  being sent to MD to  give her  a  call. Sister  expressed  assent  for  plan. Sister  said pt  was  waiting to speak to  psychiatrist.  ESSENCE Buckley, ALLAN      2-20-24 1516  Per  psych pt  will need  a    geropsych admission at   [place].   ESSENCE Buckley, CAROLYNW

## 2024-02-20 NOTE — PROGRESS NOTES
"Heather Romo is a 73 y.o. female on day 4 of admission presenting with Altered mental status, unspecified altered mental status type.    Subjective   History Of Present Illness  Heather Romo is a 73 y.o. female presenting with psychotic catatonia and probable dementia    Taking clothes off  Sister says not at baseline, possibly paranoid, recognized her but couldn't or wouldn't identify her by name.    No agitation currently - fell back asleep  Ate a few bites at lunch, required feeding.     Unable to drink through straw.   Was chewing on it.   But drank normally this am  Did eat some lunch minced stroganoff but at some pizza normally.     Currently back in deep sleep   No lateral nystagmus to suggest hallucinating.   EEG  Discussed with sister when she is awake and eating, we will send her to psych for the paranoia. From there she can go to rehab.     Objective   Physical Exam  Psychiatric:      Comments: Pt fell back into deep sleep, not rousable, no lateral nystagmush     Last Recorded Vitals  Blood pressure 127/58, pulse 81, temperature 36.6 °C (97.8 °F), temperature source Temporal, resp. rate 18, height 1.6 m (5' 3\"), weight 74 kg (163 lb 1.6 oz), SpO2 97 %.  Intake/Output last 3 Shifts:  No intake/output data recorded.    Relevant Results  Scheduled medications  atorvastatin, 80 mg, oral, Nightly  enoxaparin, 40 mg, subcutaneous, q24h  lisinopril, 10 mg, oral, Daily  melatonin, 3 mg, oral, Daily  mirtazapine, 7.5 mg, oral, Nightly  sennosides, 2 tablet, oral, BID  thiamine, 100 mg, oral, Daily      PRN medications: acetaminophen **OR** acetaminophen **OR** acetaminophen, haloperidol lactate, hydrALAZINE, hydrALAZINE    Results for orders placed or performed during the hospital encounter of 02/13/24 (from the past 24 hour(s))   CBC   Result Value Ref Range    WBC 10.8 4.4 - 11.3 x10*3/uL    nRBC 0.0 0.0 - 0.0 /100 WBCs    RBC 3.95 (L) 4.00 - 5.20 x10*6/uL    Hemoglobin 12.2 12.0 - 16.0 g/dL    " Hematocrit 38.4 36.0 - 46.0 %    MCV 97 80 - 100 fL    MCH 30.9 26.0 - 34.0 pg    MCHC 31.8 (L) 32.0 - 36.0 g/dL    RDW 13.5 11.5 - 14.5 %    Platelets 333 150 - 450 x10*3/uL   Basic Metabolic Panel   Result Value Ref Range    Glucose 119 (H) 74 - 99 mg/dL    Sodium 141 136 - 145 mmol/L    Potassium 4.1 3.5 - 5.3 mmol/L    Chloride 106 98 - 107 mmol/L    Bicarbonate 24 21 - 32 mmol/L    Anion Gap 15 10 - 20 mmol/L    Urea Nitrogen 25 (H) 6 - 23 mg/dL    Creatinine 1.33 (H) 0.50 - 1.05 mg/dL    eGFR 42 (L) >60 mL/min/1.73m*2    Calcium 9.0 8.6 - 10.3 mg/dL      Assessment/Plan   Principal Problem:    Altered mental status, unspecified altered mental status type  Active Problems:    Confusion    AMS (altered mental status)    A/P  Psychotic Disorder with Catatonia   Dementia unquantified  Possible Psychotic Depression    PLAN:  Restraints per primary service  Change Haldol 1 mg q8hrs IV PRN agitation can give IM if no IV  Dc Lorazepam   mirtazapine 7.5 mg hs    Melatonin 3 mg hs  Magic cup or protein pudding       I spent 30 minutes in the professional and overall care of this patient.      Ilene Yang MD

## 2024-02-20 NOTE — CONSULTS
Nutrition Assessment Note    Reason for Assessment  Reason for Assessment: Provider consult order    Pt admitted for:  Altered mental status, unspecified altered mental status type [R41.82]  AMS (altered mental status) [R41.82]    Chart reviewed and pt visited, family at bedside.  Per flowsheets and notes pt with poor intake since admission.  Per family unsure of UBW, how many meals a day she normally would eat or when the poor intake started however did mention pt with poor fluid intake since admission as well.    Pt considered at moderate risk for refeeding syndrome.    Past Medical History:   Diagnosis Date    Personal history of transient ischemic attack (TIA), and cerebral infarction without residual deficits 08/07/2014    History of transient cerebral ischemia     Results for orders placed or performed during the hospital encounter of 02/13/24 (from the past 24 hour(s))   CBC   Result Value Ref Range    WBC 10.8 4.4 - 11.3 x10*3/uL    nRBC 0.0 0.0 - 0.0 /100 WBCs    RBC 3.95 (L) 4.00 - 5.20 x10*6/uL    Hemoglobin 12.2 12.0 - 16.0 g/dL    Hematocrit 38.4 36.0 - 46.0 %    MCV 97 80 - 100 fL    MCH 30.9 26.0 - 34.0 pg    MCHC 31.8 (L) 32.0 - 36.0 g/dL    RDW 13.5 11.5 - 14.5 %    Platelets 333 150 - 450 x10*3/uL   Basic Metabolic Panel   Result Value Ref Range    Glucose 119 (H) 74 - 99 mg/dL    Sodium 141 136 - 145 mmol/L    Potassium 4.1 3.5 - 5.3 mmol/L    Chloride 106 98 - 107 mmol/L    Bicarbonate 24 21 - 32 mmol/L    Anion Gap 15 10 - 20 mmol/L    Urea Nitrogen 25 (H) 6 - 23 mg/dL    Creatinine 1.33 (H) 0.50 - 1.05 mg/dL    eGFR 42 (L) >60 mL/min/1.73m*2    Calcium 9.0 8.6 - 10.3 mg/dL     Scheduled medications  atorvastatin, 80 mg, oral, Nightly  enoxaparin, 40 mg, subcutaneous, q24h  lisinopril, 10 mg, oral, Daily  melatonin, 3 mg, oral, Daily  mirtazapine, 7.5 mg, oral, Nightly  sennosides, 2 tablet, oral, BID  thiamine, 100 mg, oral, Daily      Continuous medications     PRN medications  PRN  "medications: acetaminophen **OR** acetaminophen **OR** acetaminophen, haloperidol lactate, hydrALAZINE, hydrALAZINE  Dietary Orders (From admission, onward)       Start     Ordered    02/18/24 0032  Oral nutritional supplements  Until discontinued        Comments: chocolate   Question Answer Comment   Deliver with All meals    Select supplement: Magic Cup        02/18/24 0035    02/14/24 0519  May Participate in Room Service  Once        Question:  .  Answer:  Yes    02/14/24 0520 02/14/24 0030  Adult diet Regular  Diet effective now        Question:  Diet type  Answer:  Regular    02/14/24 0029                  History:  Food and Nutrient History  Energy Intake: Poor < 50 %  Food and Nutrient History: Since admission- 14- poor, 15- poor, 18- poor, 19- poor, 20- poor    Anthropometrics:  Height: 160 cm (5' 3\")  Weight: 74 kg (163 lb 1.6 oz)  BMI (Calculated): 28.9    Wt Readings from Last 2 Encounters:   02/13/24 74 kg (163 lb 1.6 oz)   02/10/23 74.1 kg (163 lb 5 oz)     Weight Change  Significant Weight Loss: No    Estimated Energy Needs  Total Energy Estimated Needs (kCal): 1850 kCal  Total Estimated Energy Need per Day (kCal/kg): 2220 kCal/kg  Method for Estimating Needs: 25-30    Estimated Protein Needs  Total Protein Estimated Needs (g): 60 g  Total Protein Estimated Needs (g/kg): 75 g/kg  Method for Estimating Needs: 0.8-1.0    Estimated Fluid Needs  Method for Estimating Needs: 1ml/kcal or per MD    Nutrition Focused Physical Findings:  Subcutaneous Fat Loss  Orbital Fat Pads: Mild-Moderate (slight dark circles and slight hollowing)  Buccal Fat Pads: Mild-Moderate (flat cheeks, minimal bounce)    Muscle Wasting  Temporalis: Well nourished (well-defined muscle)    Edema  Edema: none    Physical Findings (Nutrition Deficiency/Toxicity)  Skin: Positive (bruising)     Nutrition Diagnosis   Malnutrition Diagnosis  Patient has Malnutrition Diagnosis: Yes  Diagnosis Status: New  Malnutrition Diagnosis: Severe " malnutrition related to acute disease or injury  As Evidenced by: prolonged poor intake of < 50% of estimated energy needs in > 5 days and mild to moderate subcutaneous fat loss present    Nutrition Interventions/Recommendations   Nutrition Prescription  Individualized Nutrition Prescription Provided for : Should pt intake remain sub optimal, consider alternate route for nutrition vs goals of care discussion    Food and/or Nutrient Delivery Interventions  Meals and Snacks: General healthful diet     Medical Food Supplement: Commercial beverage  Goal: Magic Cup TID for encouraged intake    Education Documentation  No documentation found.      Nutrition Monitoring and Evaluation   Food and Nutrient Related History  Energy Intake: Estimated energy intake    Fluid Intake: Estimated fluid intake    Amount of Food: Estimated amout of food, Medical food intake    Mealtime Behavior: Limited number of accepted foods    Anthropometrics: Body Composition/Growth/Weight History  Weight Change: Weight gain, Weight loss    Biochemical Data, Medical Tests and Procedures  Electrolyte and Renal Panel: Other (Comment)  Criteria: as clinically indicated    Gastrointestinal Profile: Other (Comment)  Criteria: as clinically indicated    Glucose/Endocrine Profile: Other (Comment)  Criteria: as clinically indicated    Nutritional Anemia Profile: Other (Comment)  Criteria: as clinically indicated    Vitamin Profile: Other (Comment)  Criteria: as clinically indicated    Nutrition Focused Physical Findings  Adipose: Loss of subcutaneous fat    Digestive System: Anorexia    Other: Stool output, Urine volume, Overall appearance    Follow Up  Time Spent (min): 60 minutes  Last Date of Nutrition Visit: 02/20/24  Nutrition Follow-Up Needed?: Dietitian to reassess per policy  Follow up Comment: MAMIE NAGEL

## 2024-02-20 NOTE — PROGRESS NOTES
"Occupational Therapy    Occupational Therapy Treatment    Name: Heather Romo  MRN: [id number]  : 1950  Date: 24  Time Calculation  Start Time: 110  Stop Time: 1120  Time Calculation (min): 13 min       24 1107   OT Last Visit   OT Received On 24   General   Reason for Referral decline in ADLs. AMS   Prior to Session Communication Physician;Care Coordinator   Patient Position Received Bed, 3 rail up;Alarm on   Preferred Learning Style verbal   General Comment pt somnolent on arrival, awakens to verbal cues though remains somewhat somnolent throughout. Disoriented   Precautions   Medical Precautions Fall precautions  (poor cognition)   Vital Signs   Heart Rate 81   Pain Assessment   Pain Assessment 0-10   Pain Score 0 - No pain   Cognition   Overall Cognitive Status Impaired   Arousal/Alertness Delayed responses to stimuli   Orientation Level Disoriented to person;Disoriented to situation;Disoriented to time;Disoriented to place  (pt unable to state birthday, kept stating \"\")   Following Commands Follows one step commands with repetition  (and hand over hand cues for initiation)   Coordination   Movements are Fluid and Coordinated No   Trunk Coordination poor sitting balance with lateral LOB to L side, decr. ability to self correct   Bed Mobility   Bed Mobility Yes   Bed Mobility 1   Bed Mobility 1 Supine to sitting   Level of Assistance 1 Maximum assistance;Maximum verbal cues;Maximum tactile cues   Bed Mobility Comments 1 HOB elevated, heavy trunk and LE support   Bed Mobility 2   Bed Mobility  2 Sitting to supine   Level of Assistance 2 Moderate assistance;Maximum verbal cues;Maximum tactile cues   Bed Mobility Comments 2 LE and trunk support   Ambulation/Gait Training   Ambulation/Gait Training Performed No   Transfers   Transfer   (did not attempt due to poor sitting balance)   Static Sitting Balance   Static Sitting-Level of Assistance Moderate assistance  (min-mod assist for " static sitting, lateral LOB to L, pt attempts to integrate LUE via mattress for support though L UE continually slips of EOB)   Dynamic Sitting Balance   Dynamic Sitting-Balance Lateral lean;Forward lean;Trunk control activities  (working on static and dynamic sitting balance; max v/t cues for weight shifting to R, pt able to manage briefly though continues to have poor sitting balance throughout session; constant min/mod ax1 trunk support to maintain)   Therapeutic Exercise   Therapeutic Exercise Performed Yes   Balance/Neuromuscular Re-Education   Balance/Neuromuscular Re-Education Activity Performed Yes   Sensory   Sensory Integration   (8 min seated EOB balance activity as described above)   Strength   Strength Comments poor trunk strength/control, generalized weakness   IP OT Assessment   OT Assessment pt demonstrating decline in functional mobility from prior session   Prognosis Fair   Barriers to Discharge Decreased caregiver support  (poor cognition)   Evaluation/Treatment Tolerance Patient limited by fatigue   Medical Staff Made Aware Yes   End of Session Communication Physician;Care Coordinator   End of Session Patient Position Bed, 3 rail up;Alarm on  (same position as pt found upon entry)   OT Assessment   OT Assessment Results Decreased ADL status;Decreased cognition;Decreased endurance;Decreased safe judgment during ADL;Decreased functional mobility;Decreased gross motor control;Decreased trunk control for functional activities;Decreased upper extremity strength   Barriers to Participation Ability to acquire knowledge   Inpatient Plan   Treatment Interventions ADL retraining;Functional transfer training;UE strengthening/ROM;Endurance training;Cognitive reorientation;Equipment evaluation/education;Neuromuscular reeducation;Compensatory technique education;Fine motor coordination activities;Patient/family training   OT Frequency 3 times per week   OT Discharge Recommendations Moderate intensity level of  continued care   OT - OK to Discharge Yes   OT Recommended Transfer Status Assist of 1;Maximum assist       Outcome Measures:  [facility] Daily Activity  Putting on and taking off regular lower body clothing: Total  Bathing (including washing, rinsing, drying): A lot  Putting on and taking off regular upper body clothing: A lot  Toileting, which includes using toilet, bedpan or urinal: Total  Taking care of personal grooming such as brushing teeth: A lot  Eating Meals: A lot  Daily Activity - Total Score: 10      Education Documentation  ADL Training, taught by Brandon Stevenson OT at 2/20/2024 11:49 AM.  Learner: Patient  Readiness: Acceptance  Method: Explanation  Response: No Evidence of Learning    Education Comments  No comments found.      Goals:  Encounter Problems       Encounter Problems (Active)       ADLs       Patient will perform UB and LB sponge bathing  with setup assist level of assistance. (Progressing)       Start:  02/14/24    Expected End:  02/21/24            Patient will complete daily grooming tasks with modified independent level of assistance and PRN adaptive equipment while standing. (Not Progressing)       Start:  02/14/24    Expected End:  02/21/24            Patient will complete toileting including hygiene clothing management/hygiene with modified independent level of assistance and raised toilet seat and grab bars. (Not Progressing)       Start:  02/14/24    Expected End:  02/21/24               COGNITION/SAFETY       Patient will follow 100% Two step commands to allow improved ADL performance. (Not Progressing)       Start:  02/14/24    Expected End:  02/21/24            Patient will demonstrated orientation x 3 with 1 verbal cue. (Not Progressing)       Start:  02/14/24    Expected End:  02/21/24       ORIENTATION            MOBILITY       Patient will perform Functional mobility  Household distances/Community Distances with  mod independence level of assistance and least restrictive  device in order to improve safety and functional mobility. (Not Progressing)       Start:  02/14/24    Expected End:  02/21/24

## 2024-02-20 NOTE — CARE PLAN
Problem: Pain - Adult  Goal: Verbalizes/displays adequate comfort level or baseline comfort level  Outcome: Progressing     Problem: Safety - Adult  Goal: Free from fall injury  Outcome: Progressing     Problem: Discharge Planning  Goal: Discharge to home or other facility with appropriate resources  Outcome: Progressing     Problem: Chronic Conditions and Co-morbidities  Goal: Patient's chronic conditions and co-morbidity symptoms are monitored and maintained or improved  Outcome: Progressing     Problem: Skin  Goal: Decreased wound size/increased tissue granulation at next dressing change  Outcome: Progressing  Flowsheets (Taken 2/20/2024 0430)  Decreased wound size/increased tissue granulation at next dressing change: Promote sleep for wound healing  Goal: Participates in plan/prevention/treatment measures  Outcome: Progressing  Flowsheets (Taken 2/20/2024 0430)  Participates in plan/prevention/treatment measures: Elevate heels  Goal: Prevent/manage excess moisture  Outcome: Progressing  Flowsheets (Taken 2/20/2024 0430)  Prevent/manage excess moisture:   Moisturize dry skin   Cleanse incontinence/protect with barrier cream  Goal: Prevent/minimize sheer/friction injuries  Outcome: Progressing  Flowsheets (Taken 2/20/2024 0430)  Prevent/minimize sheer/friction injuries: Increase activity/out of bed for meals  Goal: Promote/optimize nutrition  Outcome: Progressing  Flowsheets (Taken 2/20/2024 0430)  Promote/optimize nutrition: Assist with feeding  Goal: Promote skin healing  Outcome: Progressing  Flowsheets (Taken 2/20/2024 0430)  Promote skin healing: Turn/reposition every 2 hours/use positioning/transfer devices     Problem: Safety - Medical Restraint  Goal: Remains free of injury from restraints (Restraint for Interference with Medical Device)  Outcome: Progressing  Flowsheets (Taken 2/20/2024 0430)  Remains free of injury from restraints (restraint for interference with medical device): Every 2 hours: Monitor  safety, psychosocial status, comfort, nutrition and hydration  Goal: Free from restraint(s) (Restraint for Interference with Medical Device)  Outcome: Progressing  Flowsheets (Taken 2/20/2024 0430)  Free from restraint(s) (restraint for interference with medical device): ONCE/SHIFT or MINIMUM Every 12 hours: Assess and document the continuing need for restraints   The patient's goals for the shift include Sleep    The clinical goals for the shift include Improve mentation    Over the shift, the patient did not make progress toward the following goals.

## 2024-02-20 NOTE — PROGRESS NOTES
1429           2-20-24  SW met with pt and sister  at bedside to  give IMM. Sister signed.  Sister  said she  was confused about  dc plan and pt's needs. SW  spoke with [facility] and confirmed that  auth  was pending  thru [employer] for [facility]cell Young although private pay  was  also  a  backup option. Sister informed. Sister  said pt is  awaiting   a  psych consult. Sister  aware  that  MD  was also asked to  give  her  a  call. Sister expressed assent to plan.   ESSENCE Buckley, ALLAN               02/20/24 2841   Discharge Planning   Patient expects to be discharged to: inpatient psychiatric  facility       Psych clarified that pt  needs dc to IP psych. Update given to Marcell Young. She is not  med clear at this  time.    ESSENCE Buckley, LSW

## 2024-02-20 NOTE — PROGRESS NOTES
02/20/24 1242   Discharge Planning   Home or Post Acute Services Post acute facilities (Rehab/SNF/etc)   Patient expects to be discharged to: [facility] , auth requested 2/20/2024, Patient is medically ready for discharge, notified by physician Patient sister aware currently at bedside

## 2024-02-21 ENCOUNTER — APPOINTMENT (OUTPATIENT)
Dept: NEUROLOGY | Facility: HOSPITAL | Age: 74
DRG: 056 | End: 2024-02-21
Payer: MEDICARE

## 2024-02-21 LAB
ALBUMIN SERPL BCP-MCNC: 3.1 G/DL (ref 3.4–5)
ANION GAP SERPL CALC-SCNC: 14 MMOL/L (ref 10–20)
BUN SERPL-MCNC: 34 MG/DL (ref 6–23)
CALCIUM SERPL-MCNC: 9.2 MG/DL (ref 8.6–10.3)
CHLORIDE SERPL-SCNC: 110 MMOL/L (ref 98–107)
CO2 SERPL-SCNC: 22 MMOL/L (ref 21–32)
CREAT SERPL-MCNC: 1.25 MG/DL (ref 0.5–1.05)
EGFRCR SERPLBLD CKD-EPI 2021: 46 ML/MIN/1.73M*2
ERYTHROCYTE [DISTWIDTH] IN BLOOD BY AUTOMATED COUNT: 13.6 % (ref 11.5–14.5)
GLUCOSE SERPL-MCNC: 105 MG/DL (ref 74–99)
HCT VFR BLD AUTO: 36.6 % (ref 36–46)
HGB BLD-MCNC: 11.4 G/DL (ref 12–16)
MCH RBC QN AUTO: 31.3 PG (ref 26–34)
MCHC RBC AUTO-ENTMCNC: 31.1 G/DL (ref 32–36)
MCV RBC AUTO: 101 FL (ref 80–100)
NRBC BLD-RTO: 0 /100 WBCS (ref 0–0)
PHOSPHATE SERPL-MCNC: 3.5 MG/DL (ref 2.5–4.9)
PLATELET # BLD AUTO: 298 X10*3/UL (ref 150–450)
POTASSIUM SERPL-SCNC: 3.9 MMOL/L (ref 3.5–5.3)
RBC # BLD AUTO: 3.64 X10*6/UL (ref 4–5.2)
SODIUM SERPL-SCNC: 142 MMOL/L (ref 136–145)
WBC # BLD AUTO: 10.8 X10*3/UL (ref 4.4–11.3)

## 2024-02-21 PROCEDURE — 2500000001 HC RX 250 WO HCPCS SELF ADMINISTERED DRUGS (ALT 637 FOR MEDICARE OP): Performed by: INTERNAL MEDICINE

## 2024-02-21 PROCEDURE — 1200000002 HC GENERAL ROOM WITH TELEMETRY DAILY

## 2024-02-21 PROCEDURE — 2500000004 HC RX 250 GENERAL PHARMACY W/ HCPCS (ALT 636 FOR OP/ED): Performed by: PSYCHIATRY & NEUROLOGY

## 2024-02-21 PROCEDURE — 97530 THERAPEUTIC ACTIVITIES: CPT | Mod: GP,CQ

## 2024-02-21 PROCEDURE — 85027 COMPLETE CBC AUTOMATED: CPT | Performed by: INTERNAL MEDICINE

## 2024-02-21 PROCEDURE — 95819 EEG AWAKE AND ASLEEP: CPT | Performed by: STUDENT IN AN ORGANIZED HEALTH CARE EDUCATION/TRAINING PROGRAM

## 2024-02-21 PROCEDURE — 99232 SBSQ HOSP IP/OBS MODERATE 35: CPT | Performed by: INTERNAL MEDICINE

## 2024-02-21 PROCEDURE — 84100 ASSAY OF PHOSPHORUS: CPT | Performed by: INTERNAL MEDICINE

## 2024-02-21 PROCEDURE — 36415 COLL VENOUS BLD VENIPUNCTURE: CPT | Performed by: INTERNAL MEDICINE

## 2024-02-21 PROCEDURE — 95819 EEG AWAKE AND ASLEEP: CPT

## 2024-02-21 PROCEDURE — 2500000001 HC RX 250 WO HCPCS SELF ADMINISTERED DRUGS (ALT 637 FOR MEDICARE OP): Performed by: PHYSICIAN ASSISTANT

## 2024-02-21 PROCEDURE — 2500000004 HC RX 250 GENERAL PHARMACY W/ HCPCS (ALT 636 FOR OP/ED): Performed by: PHYSICIAN ASSISTANT

## 2024-02-21 RX ADMIN — ATORVASTATIN CALCIUM 80 MG: 80 TABLET, FILM COATED ORAL at 20:40

## 2024-02-21 RX ADMIN — HALOPERIDOL LACTATE 1 MG: 5 INJECTION, SOLUTION INTRAMUSCULAR at 20:41

## 2024-02-21 RX ADMIN — STANDARDIZED SENNA CONCENTRATE 17.2 MG: 8.6 TABLET ORAL at 20:40

## 2024-02-21 RX ADMIN — THIAMINE HYDROCHLORIDE 500 MG: 100 INJECTION, SOLUTION INTRAMUSCULAR; INTRAVENOUS at 18:20

## 2024-02-21 RX ADMIN — THIAMINE HYDROCHLORIDE 500 MG: 100 INJECTION, SOLUTION INTRAMUSCULAR; INTRAVENOUS at 21:57

## 2024-02-21 RX ADMIN — HALOPERIDOL LACTATE 1 MG: 5 INJECTION, SOLUTION INTRAMUSCULAR at 00:32

## 2024-02-21 RX ADMIN — ENOXAPARIN SODIUM 40 MG: 40 INJECTION SUBCUTANEOUS at 23:27

## 2024-02-21 ASSESSMENT — COGNITIVE AND FUNCTIONAL STATUS - GENERAL
STANDING UP FROM CHAIR USING ARMS: A LOT
WALKING IN HOSPITAL ROOM: TOTAL
CLIMB 3 TO 5 STEPS WITH RAILING: TOTAL
MOVING TO AND FROM BED TO CHAIR: A LOT
DRESSING REGULAR LOWER BODY CLOTHING: TOTAL
DRESSING REGULAR UPPER BODY CLOTHING: TOTAL
CLIMB 3 TO 5 STEPS WITH RAILING: TOTAL
TURNING FROM BACK TO SIDE WHILE IN FLAT BAD: TOTAL
TURNING FROM BACK TO SIDE WHILE IN FLAT BAD: TOTAL
TURNING FROM BACK TO SIDE WHILE IN FLAT BAD: A LOT
STANDING UP FROM CHAIR USING ARMS: TOTAL
EATING MEALS: TOTAL
CLIMB 3 TO 5 STEPS WITH RAILING: TOTAL
MOBILITY SCORE: 6
EATING MEALS: TOTAL
TOILETING: TOTAL
MOVING TO AND FROM BED TO CHAIR: TOTAL
PERSONAL GROOMING: TOTAL
STANDING UP FROM CHAIR USING ARMS: TOTAL
PERSONAL GROOMING: TOTAL
HELP NEEDED FOR BATHING: TOTAL
MOVING FROM LYING ON BACK TO SITTING ON SIDE OF FLAT BED WITH BEDRAILS: TOTAL
DAILY ACTIVITIY SCORE: 6
MOVING TO AND FROM BED TO CHAIR: TOTAL
WALKING IN HOSPITAL ROOM: TOTAL
TOILETING: TOTAL
WALKING IN HOSPITAL ROOM: TOTAL
DAILY ACTIVITIY SCORE: 6
MOBILITY SCORE: 6
MOVING FROM LYING ON BACK TO SITTING ON SIDE OF FLAT BED WITH BEDRAILS: TOTAL
MOVING FROM LYING ON BACK TO SITTING ON SIDE OF FLAT BED WITH BEDRAILS: A LITTLE
DRESSING REGULAR UPPER BODY CLOTHING: TOTAL
MOBILITY SCORE: 11
HELP NEEDED FOR BATHING: TOTAL
DRESSING REGULAR LOWER BODY CLOTHING: TOTAL

## 2024-02-21 ASSESSMENT — PAIN SCALES - GENERAL: PAINLEVEL_OUTOF10: 0 - NO PAIN

## 2024-02-21 ASSESSMENT — PAIN - FUNCTIONAL ASSESSMENT: PAIN_FUNCTIONAL_ASSESSMENT: UNABLE TO SELF-REPORT

## 2024-02-21 NOTE — SIGNIFICANT EVENT
EEG is indicative of severe diffuse encephalopathy. No epileptiform abnormalities or lateralizing signs noted.     Hold melatonin and mirtazapine  Restart high dose IV thiamine  There do not appear to be other contributing medications  Requested neuro consult for further recommendations, attending aware.     Consider nutrition please, pt unable to eat today

## 2024-02-21 NOTE — CARE PLAN
Problem: Pain - Adult  Goal: Verbalizes/displays adequate comfort level or baseline comfort level  Outcome: Progressing     Problem: Safety - Adult  Goal: Free from fall injury  Outcome: Progressing     Problem: Chronic Conditions and Co-morbidities  Goal: Patient's chronic conditions and co-morbidity symptoms are monitored and maintained or improved  Outcome: Progressing     Problem: Skin  Goal: Prevent/manage excess moisture  Flowsheets (Taken 2/21/2024 0130)  Prevent/manage excess moisture: Cleanse incontinence/protect with barrier cream    The patient's goals for the shift include Maintain safety     The clinical goals for the shift include Maintain safety

## 2024-02-21 NOTE — PROGRESS NOTES
Heather Romo is a 73 y.o. female on day 5 of admission presenting with Altered mental status, unspecified altered mental status type.      Subjective   Patient was seen and examined at bedside this morning, was breathing comfortably not in distress, however not responding to voice command, muscle tone was intact, pupils are reactive to light on exam.       Objective     Last Recorded Vitals  /86   Pulse 101   Temp 37.1 °C (98.8 °F)   Resp 18   Wt 74 kg (163 lb 1.6 oz)   SpO2 98%   Intake/Output last 3 Shifts:  No intake or output data in the 24 hours ending 02/21/24 1749    Admission Weight  Weight: 74 kg (163 lb 1.6 oz) (02/13/24 1617)    Daily Weight  02/13/24 : 74 kg (163 lb 1.6 oz)    Image Results  EEG  IMPRESSION    This EEG is indicative of severe diffuse encephalopathy. No epileptiform abnormalities or lateralizing signs noted.    A full report will be scanned into the patient's chart at a later time.    This report has been interpreted and electronically signed by      Physical Exam  Constitutional: Elderly female, not responding to voice command appears stated, with intact muscle tone, not in acute distress  Eyes: PERRLA, clear sclera  ENMT: Moist mucosal membranes, no exudate  Head / Neck: Atraumatic, normocephalic, supple neck, JVP not visualized  Lungs: Patent airways, CTABL  Heart: RRR, S1S2, no murmurs appreciated, palpable pulses in all extremities  GI: Soft, NT, ND, bowel sounds present in all quadrants  MSK: Unable to complete due to lack of cooperation  Extremities: Intact x 4, no peripheral edema  : No Jones catheter inserted  Breast: Deferred  Neurological: AAO x 0,  facial muscles symmetrical, withdraws to pain stimuli, muscle tone intact, resists limb mobilization  Psychological: Appropriate mood and behavior    Relevant Results           Scheduled medications  atorvastatin, 80 mg, oral, Nightly  enoxaparin, 40 mg, subcutaneous, q24h  lisinopril, 10 mg, oral, Daily  [Held by  provider] melatonin, 3 mg, oral, Daily  [Held by provider] mirtazapine, 7.5 mg, oral, Nightly  sennosides, 2 tablet, oral, BID  thiamine, 500 mg, intravenous, TID      Continuous medications     PRN medications  PRN medications: acetaminophen **OR** acetaminophen **OR** acetaminophen, haloperidol lactate, hydrALAZINE, hydrALAZINE    Scheduled medications  atorvastatin, 80 mg, oral, Nightly  enoxaparin, 40 mg, subcutaneous, q24h  lisinopril, 10 mg, oral, Daily  [Held by provider] melatonin, 3 mg, oral, Daily  [Held by provider] mirtazapine, 7.5 mg, oral, Nightly  sennosides, 2 tablet, oral, BID  thiamine, 500 mg, intravenous, TID      Continuous medications     PRN medications  PRN medications: acetaminophen **OR** acetaminophen **OR** acetaminophen, haloperidol lactate, hydrALAZINE, hydrALAZINE    Assessment/Plan      73 y.o. female presenting with altered mental status and confusion.  This patient has a history of a remote TIA about 10 years ago, hypertension, hyperlipidemia, hyperglycemia, CKD and Charcot-Rylie-Tooth syndrome with foot drop.  History is provided by the patient but also by her sister.  Patient lives alone in her own home.  The sister states that 8 days ago the patient was not answering her phone which is unusual for her.        Principal Problem:    Altered mental status, unspecified altered mental status type  Active Problems:    Confusion    AMS (altered mental status)    Altered mental status  Alzheimer's dementia?  Psychotic disorder: Continue mirtazapine 7.5 mg at bedtime, Haldol 1 mg IV every 8 hours as needed agitation  Catatonia  -MRI brain negative for acute process  -neurology consult: Appreciate management recommendations, reconsulted for persistent altered mental status  -EEG for further evaluation negative for seizures  -psych consult: Recommend geropsych admission  -pt off soft restraints     UTI  -urine cx negative  -ceftriaxone stopped     HTN  -continue lisinopril 10 mg daily      HLD  -continue statin, atorvastatin 80 mg daily     Diet  -Regular     DVT ppx  -Lovenox 40 mg subcu daily     Disposition: Presenting with catatonia, with altered mental status, gradually improving, discharge pending placement to [facility]         Malnutrition Diagnosis Status: New  Malnutrition Diagnosis: Severe malnutrition related to acute disease or injury  As Evidenced by: prolonged poor intake of < 50% of estimated energy needs in > 5 days and mild to moderate subcutaneous fat loss present  I agree with the dietitian's malnutrition diagnosis.         Gil Mora, DO

## 2024-02-21 NOTE — PROGRESS NOTES
02/21/24 0959   Discharge Planning   Patient expects to be discharged to: [facility] -Dr. Yang is following,   Does the patient need discharge transport arranged? Yes   RoundTrip coordination needed? Yes   Has discharge transport been arranged? No

## 2024-02-21 NOTE — PROGRESS NOTES
Heather Romo is a 73 y.o. female on day 4 of admission presenting with Altered mental status, unspecified altered mental status type.      Subjective   Patient was seen and examined at bedside this morning, and seen again in the company of sister, was asking for update.  Concomitantly psychiatrist called for FaceTime encounter.  Patient was initially conversational, alert, and during psychiatry encounter seemed not to be interested in conversation, didn't respond to voice command, had no signs of altered sensorium or hemodynamic instability as patient was seen with regular rate of breathing, and not in distress.       Objective     Last Recorded Vitals  /66 (BP Location: Right arm, Patient Position: Lying)   Pulse 104   Temp 36.8 °C (98.2 °F) (Temporal)   Resp 18   Wt 74 kg (163 lb 1.6 oz)   SpO2 96%   Intake/Output last 3 Shifts:  No intake or output data in the 24 hours ending 02/20/24 8322    Admission Weight  Weight: 74 kg (163 lb 1.6 oz) (02/13/24 1617)    Daily Weight  02/13/24 : 74 kg (163 lb 1.6 oz)    Image Results  Electrocardiogram, 12-lead PRN ACS symptoms  Sinus tachycardia with Premature supraventricular complexes and Fusion complexes  ST elevation, consider inferior injury or acute infarct  ** ** ACUTE MI ** **  Consider right ventricular involvement in acute inferior infarct  Abnormal ECG  When compared with ECG of 13-FEB-2024 23:49,  Significant changes have occurred  MR brain wo IV contrast  Narrative: Interpreted By:  Jeromy Wallace,   STUDY:  MR BRAIN WO IV CONTRAST;  2/19/2024 7:57 pm      INDICATION:  Signs/Symptoms:altered mental status, confusion, hx of remote TIA.      COMPARISON:  CT dated 02/13/2024. MRI dated 08/19/2018.      ACCESSION NUMBER(S):  [id number]      ORDERING CLINICIAN:  WONG WEST      TECHNIQUE:  Axial diffusion, axial T2, axial FLAIR, axial gradient echo T2,  coronal T1, and sagittal T1 weighted MRI images of the brain were  obtained without  intravenous contrast administration.      FINDINGS:  The diffusion weighted images fail to demonstrate evidence of  abnormal diffusion restriction to suggest acute infarction.      There is moderate brain parenchymal volume loss.      The lateral ventricles demonstrate mild disproportionate prominence  when compared with sulci within cerebral convexities. No obstructing  mass lesion is noted on this noncontrast MRI study. This mild  disproportionate ventriculomegaly may be related to more pronounced  central volume loss versus a component of communicating hydrocephalus.      There is an area of encephalomalacia/gliosis within the left frontal  corona radiata and left basal ganglia with compensatory dilatation of  the adjacent left lateral ventricle.      There are nonspecific white matter changes noted within the cerebral  hemispheres bilaterally more pronounced when compared with the prior  MRI dated 08/19/2015 which while nonspecific, given the patient's  age, likely represent sequelae of more remote small-vessel ischemic  change.      Additional small foci of bright signal on the T2 weighted images are  identified within the bilateral subinsular regions, right basal  ganglia, and bilateral thalami suggesting incidental mildly prominent  perivascular spaces and/or scattered more remote lacunar infarctions.      There is persistent minimal bowing of the septum pellucidum to the  left of midline. The suprasellar/basilar cisterns are patent.      The paranasal sinuses and mastoid air cells are clear.      Impression: There is moderate brain parenchymal volume loss.      The lateral ventricles demonstrate mild disproportionate prominence  when compared with sulci within cerebral convexities. No obstructing  mass lesion is noted on this noncontrast MRI study. This mild  disproportionate ventriculomegaly may be related to more pronounced  central volume loss versus a component of communicating hydrocephalus.      There  is an area of encephalomalacia/gliosis within the left frontal  corona radiata and left basal ganglia with compensatory dilatation of  the adjacent left lateral ventricle.      There are nonspecific white matter changes noted within the cerebral  hemispheres bilaterally more pronounced when compared with the prior  MRI dated 08/19/2015 which while nonspecific, given the patient's  age, likely represent sequelae of more remote small-vessel ischemic  change.  Additional small foci of bright signal on the T2 weighted  images are identified within the bilateral subinsular regions, right  basal ganglia, and bilateral thalami suggesting incidental mildly  prominent perivascular spaces and/or scattered more remote lacunar  infarctions.          MACRO:  None.      Signed by: Jeromy Wallace 2/20/2024 7:45 AM  Dictation workstation:   [id number]      Physical Exam  Constitutional: Elderly female, initially alert active, cooperative not in acute distress, but during encounter had an episode of not responding to voice command despite normal muscle tone, lack of focal deficit and and having normal vital signs.  Eyes: PERRLA, clear sclera  ENMT: Moist mucosal membranes, no exudate  Head / Neck: Atraumatic, normocephalic, supple neck, JVP not visualized  Lungs: Patent airways, CTABL  Heart: RRR, S1S2, no murmurs appreciated, palpable pulses in all extremities  GI: Soft, NT, ND, bowel sounds present in all quadrants  MSK: Moves all extremities freely, no restriction  of ROM, no joint edema  Extremities: Intact x 4, no peripheral edema  : No Jones catheter inserted  Breast: Deferred  Neurological: AAO x 3 to person, place and date, facial muscles symmetrical, sensation intact, strength 4/4, no acute focal neurological deficits appreciated  Psychological: Appropriate mood and behavior   Relevant Results          Scheduled medications  atorvastatin, 80 mg, oral, Nightly  enoxaparin, 40 mg, subcutaneous, q24h  lisinopril, 10 mg,  oral, Daily  melatonin, 3 mg, oral, Daily  mirtazapine, 7.5 mg, oral, Nightly  sennosides, 2 tablet, oral, BID  thiamine, 100 mg, oral, Daily      Continuous medications     PRN medications  PRN medications: acetaminophen **OR** acetaminophen **OR** acetaminophen, haloperidol lactate, hydrALAZINE, hydrALAZINE  Electrocardiogram, 12-lead PRN ACS symptoms    Result Date: 2/20/2024  Sinus tachycardia with Premature supraventricular complexes and Fusion complexes ST elevation, consider inferior injury or acute infarct ** ** ACUTE MI ** ** Consider right ventricular involvement in acute inferior infarct Abnormal ECG When compared with ECG of 13-FEB-2024 23:49, Significant changes have occurred    MR brain wo IV contrast    Result Date: 2/20/2024  Interpreted By:  Jeromy Wallace, STUDY: MR BRAIN WO IV CONTRAST;  2/19/2024 7:57 pm   INDICATION: Signs/Symptoms:altered mental status, confusion, hx of remote TIA.   COMPARISON: CT dated 02/13/2024. MRI dated 08/19/2018.   ACCESSION NUMBER(S): [id number]   ORDERING CLINICIAN: WONG WEST   TECHNIQUE: Axial diffusion, axial T2, axial FLAIR, axial gradient echo T2, coronal T1, and sagittal T1 weighted MRI images of the brain were obtained without intravenous contrast administration.   FINDINGS: The diffusion weighted images fail to demonstrate evidence of abnormal diffusion restriction to suggest acute infarction.   There is moderate brain parenchymal volume loss.   The lateral ventricles demonstrate mild disproportionate prominence when compared with sulci within cerebral convexities. No obstructing mass lesion is noted on this noncontrast MRI study. This mild disproportionate ventriculomegaly may be related to more pronounced central volume loss versus a component of communicating hydrocephalus.   There is an area of encephalomalacia/gliosis within the left frontal corona radiata and left basal ganglia with compensatory dilatation of the adjacent left lateral ventricle.    There are nonspecific white matter changes noted within the cerebral hemispheres bilaterally more pronounced when compared with the prior MRI dated 08/19/2015 which while nonspecific, given the patient's age, likely represent sequelae of more remote small-vessel ischemic change.   Additional small foci of bright signal on the T2 weighted images are identified within the bilateral subinsular regions, right basal ganglia, and bilateral thalami suggesting incidental mildly prominent perivascular spaces and/or scattered more remote lacunar infarctions.   There is persistent minimal bowing of the septum pellucidum to the left of midline. The suprasellar/basilar cisterns are patent.   The paranasal sinuses and mastoid air cells are clear.       There is moderate brain parenchymal volume loss.   The lateral ventricles demonstrate mild disproportionate prominence when compared with sulci within cerebral convexities. No obstructing mass lesion is noted on this noncontrast MRI study. This mild disproportionate ventriculomegaly may be related to more pronounced central volume loss versus a component of communicating hydrocephalus.   There is an area of encephalomalacia/gliosis within the left frontal corona radiata and left basal ganglia with compensatory dilatation of the adjacent left lateral ventricle.   There are nonspecific white matter changes noted within the cerebral hemispheres bilaterally more pronounced when compared with the prior MRI dated 08/19/2015 which while nonspecific, given the patient's age, likely represent sequelae of more remote small-vessel ischemic change.  Additional small foci of bright signal on the T2 weighted images are identified within the bilateral subinsular regions, right basal ganglia, and bilateral thalami suggesting incidental mildly prominent perivascular spaces and/or scattered more remote lacunar infarctions.     MACRO: None.   Signed by: Jeromy Wallace 2/20/2024 7:45 AM Dictation  workstation:   [id number]    ECG 12 lead    Result Date: 2/14/2024  Normal sinus rhythm Normal ECG When compared with ECG of 13-FEB-2024 16:41, (unconfirmed) No significant change was found See ED provider note for full interpretation and clinical correlation Confirmed by Adalberto Godinez ([id number]) on 2/14/2024 3:41:17 PM    ECG 12 lead    Result Date: 2/14/2024  Sinus bradycardia Minimal voltage criteria for LVH, may be normal variant ( R in aVL ) Cannot rule out Anterior infarct , age undetermined Abnormal ECG When compared with ECG of 17-AUG-2015 16:44, T wave amplitude has decreased in Anterior leads See ED provider note for full interpretation and clinical correlation Confirmed by Adalberto Godinez ([id number]) on 2/14/2024 1:46:10 PM    CT head wo IV contrast    Result Date: 2/13/2024  Interpreted By:  Renyn Cast, STUDY: CT HEAD WO IV CONTRAST;  2/13/2024 8:30 pm   INDICATION: Signs/Symptoms:AMS.   COMPARISON: MRI 08/2000   ACCESSION NUMBER(S): [id number]   ORDERING CLINICIAN: YOSEF DUNLAP   TECHNIQUE: Noncontrast axial CT scan of head was performed. Angled reformats in brain and bone windows were generated. The images were reviewed in bone, brain, blood and soft tissue windows.   FINDINGS: Global volume loss. Lacunar infarcts seen in the left basal ganglia again seen. Patchy periventricular low attenuation compatible with advanced chronic small vessel ischemic change. Degree is advanced for age. If concern for subtle acute ischemic changes, consider MRI. No hydrocephalus. Vascular calcification.         Similar appearance with advanced chronic small vessel ischemic change and remote left basal ganglia lacunar infarct. If concern for new ischemic changes, consider MRI. Degree of volume loss vascular changes does appear to be advanced for age.   No evidence of intracranial hemorrhage or displaced skull fracture.   MACRO: None   Signed by: Renny Cast 2/13/2024 9:08 PM Dictation workstation:    [id number]    XR chest 1 view    Result Date: 2/13/2024  Interpreted By:  Kit Woods, STUDY: XR CHEST 1 VIEW;  2/13/2024 7:40 pm   INDICATION: Signs/Symptoms:Falls.   COMPARISON: 8/17/2015   ACCESSION NUMBER(S): [id number]   ORDERING CLINICIAN: YOSEF DUNLAP   FINDINGS: The cardiac silhouette is stable in size. Atherosclerotic calcification of vasculature. No airspace consolidation or pleural effusion. No pneumothorax.       No airspace consolidation or pleural effusion.   MACRO: None   Signed by: Kit Woods 2/13/2024 7:56 PM Dictation workstation:   [id number]        Assessment/Plan        73 y.o. female presenting with altered mental status and confusion.  This patient has a history of a remote TIA about 10 years ago, hypertension, hyperlipidemia, hyperglycemia, CKD and Charcot-Rylie-Tooth syndrome with foot drop.  History is provided by the patient but also by her sister.  Patient lives alone in her own home.  The sister states that 8 days ago the patient was not answering her phone which is unusual for her.       Principal Problem:    Altered mental status, unspecified altered mental status type  Active Problems:    Confusion    AMS (altered mental status)    Altered mental status  Alzheimer's dementia?  Psychotic disorder: Continue mirtazapine 7.5 mg at bedtime, Haldol 1 mg IV every 8 hours as needed agitation  Catatonia  -MRI brain negative for acute process  -neurology consult: Appreciate management recommendations  -EEG for further evaluation  -[facility] consult[facility]  -pt off soft restraints     UTI  -urine cx negative  -ceftriaxone stopped     HTN  -continue lisinopril 10 mg daily     HLD  -continue statin, atorvastatin 80 mg daily    Diet  -Regular     DVT ppx  -Lovenox 40 mg subcu daily    Disposition: Presenting with catatonia, with altered mental status, gradually improving, discharge pending placement to [facility] Diagnosis Status:  New  Malnutrition Diagnosis: Severe malnutrition related to acute disease or injury  As Evidenced by: prolonged poor intake of < 50% of estimated energy needs in > 5 days and mild to moderate subcutaneous fat loss present  I agree with the dietitian's malnutrition diagnosis.         Gil Mora, DO

## 2024-02-22 ENCOUNTER — APPOINTMENT (OUTPATIENT)
Dept: CARDIOLOGY | Facility: HOSPITAL | Age: 74
DRG: 056 | End: 2024-02-22
Payer: MEDICARE

## 2024-02-22 LAB
ALBUMIN SERPL BCP-MCNC: 2.9 G/DL (ref 3.4–5)
ANION GAP SERPL CALC-SCNC: 16 MMOL/L (ref 10–20)
BUN SERPL-MCNC: 39 MG/DL (ref 6–23)
CALCIUM SERPL-MCNC: 8.8 MG/DL (ref 8.6–10.3)
CHLORIDE SERPL-SCNC: 109 MMOL/L (ref 98–107)
CO2 SERPL-SCNC: 24 MMOL/L (ref 21–32)
CREAT SERPL-MCNC: 1.14 MG/DL (ref 0.5–1.05)
EGFRCR SERPLBLD CKD-EPI 2021: 51 ML/MIN/1.73M*2
ERYTHROCYTE [DISTWIDTH] IN BLOOD BY AUTOMATED COUNT: 13.3 % (ref 11.5–14.5)
GLUCOSE SERPL-MCNC: 94 MG/DL (ref 74–99)
HCT VFR BLD AUTO: 36 % (ref 36–46)
HGB BLD-MCNC: 11.4 G/DL (ref 12–16)
MCH RBC QN AUTO: 31.1 PG (ref 26–34)
MCHC RBC AUTO-ENTMCNC: 31.7 G/DL (ref 32–36)
MCV RBC AUTO: 98 FL (ref 80–100)
NRBC BLD-RTO: 0 /100 WBCS (ref 0–0)
PHOSPHATE SERPL-MCNC: 3.2 MG/DL (ref 2.5–4.9)
PLATELET # BLD AUTO: 348 X10*3/UL (ref 150–450)
POTASSIUM SERPL-SCNC: 4.6 MMOL/L (ref 3.5–5.3)
RBC # BLD AUTO: 3.67 X10*6/UL (ref 4–5.2)
SODIUM SERPL-SCNC: 144 MMOL/L (ref 136–145)
WBC # BLD AUTO: 10.6 X10*3/UL (ref 4.4–11.3)

## 2024-02-22 PROCEDURE — 84100 ASSAY OF PHOSPHORUS: CPT | Performed by: INTERNAL MEDICINE

## 2024-02-22 PROCEDURE — 97110 THERAPEUTIC EXERCISES: CPT | Mod: GO | Performed by: OCCUPATIONAL THERAPIST

## 2024-02-22 PROCEDURE — 2500000004 HC RX 250 GENERAL PHARMACY W/ HCPCS (ALT 636 FOR OP/ED): Performed by: PSYCHIATRY & NEUROLOGY

## 2024-02-22 PROCEDURE — 85027 COMPLETE CBC AUTOMATED: CPT | Performed by: INTERNAL MEDICINE

## 2024-02-22 PROCEDURE — 97530 THERAPEUTIC ACTIVITIES: CPT | Mod: GO | Performed by: OCCUPATIONAL THERAPIST

## 2024-02-22 PROCEDURE — 2500000001 HC RX 250 WO HCPCS SELF ADMINISTERED DRUGS (ALT 637 FOR MEDICARE OP): Performed by: PHYSICIAN ASSISTANT

## 2024-02-22 PROCEDURE — 97530 THERAPEUTIC ACTIVITIES: CPT | Mod: GP,CQ

## 2024-02-22 PROCEDURE — 93005 ELECTROCARDIOGRAM TRACING: CPT

## 2024-02-22 PROCEDURE — 99232 SBSQ HOSP IP/OBS MODERATE 35: CPT | Performed by: INTERNAL MEDICINE

## 2024-02-22 PROCEDURE — 1200000002 HC GENERAL ROOM WITH TELEMETRY DAILY

## 2024-02-22 PROCEDURE — 36415 COLL VENOUS BLD VENIPUNCTURE: CPT | Performed by: INTERNAL MEDICINE

## 2024-02-22 RX ADMIN — THIAMINE HYDROCHLORIDE 500 MG: 100 INJECTION, SOLUTION INTRAMUSCULAR; INTRAVENOUS at 17:04

## 2024-02-22 RX ADMIN — STANDARDIZED SENNA CONCENTRATE 17.2 MG: 8.6 TABLET ORAL at 22:09

## 2024-02-22 RX ADMIN — THIAMINE HYDROCHLORIDE 500 MG: 100 INJECTION, SOLUTION INTRAMUSCULAR; INTRAVENOUS at 09:07

## 2024-02-22 RX ADMIN — THIAMINE HYDROCHLORIDE 500 MG: 100 INJECTION, SOLUTION INTRAMUSCULAR; INTRAVENOUS at 22:08

## 2024-02-22 RX ADMIN — ATORVASTATIN CALCIUM 80 MG: 80 TABLET, FILM COATED ORAL at 22:09

## 2024-02-22 ASSESSMENT — COGNITIVE AND FUNCTIONAL STATUS - GENERAL
CLIMB 3 TO 5 STEPS WITH RAILING: TOTAL
TURNING FROM BACK TO SIDE WHILE IN FLAT BAD: TOTAL
TURNING FROM BACK TO SIDE WHILE IN FLAT BAD: TOTAL
MOVING FROM LYING ON BACK TO SITTING ON SIDE OF FLAT BED WITH BEDRAILS: TOTAL
STANDING UP FROM CHAIR USING ARMS: TOTAL
DRESSING REGULAR LOWER BODY CLOTHING: TOTAL
TURNING FROM BACK TO SIDE WHILE IN FLAT BAD: TOTAL
WALKING IN HOSPITAL ROOM: TOTAL
STANDING UP FROM CHAIR USING ARMS: TOTAL
PERSONAL GROOMING: TOTAL
DRESSING REGULAR LOWER BODY CLOTHING: TOTAL
DRESSING REGULAR UPPER BODY CLOTHING: TOTAL
EATING MEALS: TOTAL
MOBILITY SCORE: 6
PERSONAL GROOMING: TOTAL
WALKING IN HOSPITAL ROOM: TOTAL
DRESSING REGULAR LOWER BODY CLOTHING: TOTAL
TOILETING: TOTAL
TOILETING: TOTAL
HELP NEEDED FOR BATHING: TOTAL
DAILY ACTIVITIY SCORE: 6
EATING MEALS: TOTAL
MOBILITY SCORE: 6
EATING MEALS: TOTAL
MOBILITY SCORE: 6
DRESSING REGULAR UPPER BODY CLOTHING: TOTAL
HELP NEEDED FOR BATHING: TOTAL
PERSONAL GROOMING: TOTAL
TOILETING: TOTAL
HELP NEEDED FOR BATHING: TOTAL
CLIMB 3 TO 5 STEPS WITH RAILING: TOTAL
MOVING TO AND FROM BED TO CHAIR: TOTAL
CLIMB 3 TO 5 STEPS WITH RAILING: TOTAL
DAILY ACTIVITIY SCORE: 6
STANDING UP FROM CHAIR USING ARMS: TOTAL
MOVING FROM LYING ON BACK TO SITTING ON SIDE OF FLAT BED WITH BEDRAILS: TOTAL
MOVING FROM LYING ON BACK TO SITTING ON SIDE OF FLAT BED WITH BEDRAILS: TOTAL
DRESSING REGULAR UPPER BODY CLOTHING: TOTAL
WALKING IN HOSPITAL ROOM: TOTAL
DAILY ACTIVITIY SCORE: 6
MOVING TO AND FROM BED TO CHAIR: TOTAL
MOVING TO AND FROM BED TO CHAIR: TOTAL

## 2024-02-22 ASSESSMENT — PAIN - FUNCTIONAL ASSESSMENT
PAIN_FUNCTIONAL_ASSESSMENT: 0-10

## 2024-02-22 ASSESSMENT — PAIN SCALES - GENERAL
PAINLEVEL_OUTOF10: 0 - NO PAIN

## 2024-02-22 NOTE — PROGRESS NOTES
Physical Therapy    Physical Therapy Treatment    Patient Name: Heather Romo  MRN: [id number]  Today's Date: 2/22/2024  Time Calculation  Start Time: 1026  Stop Time: 1040  Time Calculation (min): 14 min       Assessment/Plan   PT Assessment  End of Session Communication: Bedside nurse  End of Session Patient Position: Up in chair, Alarm on  PT Plan  Inpatient/Swing Bed or Outpatient: Inpatient  PT Plan  Treatment/Interventions: Bed mobility, Transfer training, Balance training, Neuromuscular re-education, Strengthening, Endurance training, Therapeutic activity  PT Plan: Skilled PT  PT Frequency: 3 times per week  PT Discharge Recommendations: Moderate intensity level of continued care  Equipment Recommended upon Discharge: Wheeled walker  PT Recommended Transfer Status: Assist x2  PT - OK to Discharge: Yes (per PT POC)      General Visit Information:   PT  Visit  PT Received On: 02/22/24  General  Reason for Referral: AMS, decline in ADLs  Referred By: Katy  Co-Treatment: OT  Co-Treatment Reason: To increase patient safety, transfer ability, and participation.  Prior to Session Communication: Bedside nurse  Patient Position Received: Bed, 3 rail up, Alarm on  General Comment: Pt agreeable to participate.  Poor focus on task, max one step cues needed - pt with poor follow through.    Subjective   Precautions:  Precautions  Medical Precautions: Fall precautions    Objective   Pain:  Pain Assessment  Pain Assessment: 0-10  Pain Score: 0 - No pain  Cognition:  Cognition  Overall Cognitive Status: Impaired  Arousal/Alertness: Delayed responses to stimuli  Orientation Level: Disoriented to place, Disoriented to time, Disoriented to situation  Following Commands: Follows one step commands with repetition.   Alternating Attention: Impaired  Divided Attention: Impaired  Sustained Attention: Impaired  Insight: Severe  Flexibility of Thought: Reduced flexibility  Planning: Reduced planning skills  Postural  Control:  Postural Control  Trunk Control: over all required Max A to maintain sitting EOB due to R lean/push.  Static Sitting Balance  Static Sitting-Balance Support: Bilateral upper extremity supported, Feet supported (Assst to keep feet on floor)  Static Sitting-Level of Assistance: Maximum assistance (Over all Max assist)  Static Sitting-Comment/Number of Minutes: Assist needed to position Feet and right hand for support.  Dynamic Sitting Balance  Dynamic Sitting-Balance Support: Feet supported, Bilateral upper extremity supported  Dynamic Sitting-Balance:  (LE movement)  Dynamic Sitting-Comments: Max A of one  Static Standing Balance  Static Standing-Balance Support: Bilateral upper extremity supported  Static Standing-Level of Assistance: Maximum assistance (Of 2 A.)    Activity Tolerance:  Activity Tolerance  Endurance: Tolerates 10 - 20 min exercise with multiple rests  Treatments:     Bed Mobility  Bed Mobility: Yes  Bed Mobility 1  Bed Mobility 1: Supine to sitting  Level of Assistance 1: Dependent (plus 1)  Bed Mobility Comments 1: HOB elevated fully    Transfers  Transfer: Yes  Transfer 1  Transfer From 1: Sit to, Bed to  Transfer to 1: Stand  Technique 1: Sit to stand  Transfer Device 1: Gait belt  Transfer Level of Assistance 1: Maximum tactile cues, Maximum assistance, +2 (Max/ Dep + 2.)  Trials/Comments 1: Two  trials done.  First with wk support though pt  lifting wk and not able to maneuver.  Second trial Arm and arm x 2 A.  Transfers 2  Transfer From 2: Bed to  Transfer to 2: Chair with arms  Technique 2: Stand pivot, To left  Transfer Device 2: Gait belt (arm and arm)  Transfer Level of Assistance 2: Dependent, +2, Maximum tactile cues, Maximum verbal cues  Transfers 3  Transfer From 3: Stand to  Transfer to 3: Sit, Chair with arms  Transfer Device 3: Gait belt  Transfer Level of Assistance 3: Dependent  Trials/Comments 3: pt not following insturction/cues.    Outcome Measures:  [facility] Basic  Mobility  Turning from your back to your side while in a flat bed without using bedrails: Total  Moving from lying on your back to sitting on the side of a flat bed without using bedrails: Total  Moving to and from bed to chair (including a wheelchair): Total  Standing up from a chair using your arms (e.g. wheelchair or bedside chair): Total  To walk in hospital room: Total  Climbing 3-5 steps with railing: Total  Basic Mobility - Total Score: 6    Education Documentation  Body Mechanics, taught by Magali Culp PTA at 2/22/2024 11:19 AM.  Learner: Patient  Readiness: Acceptance  Method: Explanation, Demonstration  Response: Needs Reinforcement    Mobility Training, taught by Magali Culp PTA at 2/22/2024 11:19 AM.  Learner: Patient  Readiness: Acceptance  Method: Explanation, Demonstration  Response: Needs Reinforcement    Education Comments  No comments found.        OP EDUCATION:       Encounter Problems       Encounter Problems (Active)       Balance       complete all mobility with normal balance while dual tasking, negotiating in a dynamic environment, carrying items, etc., with proactive and reactive static and dynamic standing and sitting tasks, with mod I and a RW, >15 minutes. (Not Progressing)       Start:  02/14/24    Expected End:  02/21/24               Mobility       STG - Patient will ambulate 150 ft mod I using LRAD.  (Not Progressing)       Start:  02/14/24    Expected End:  02/21/24            STG - Patient will ascend and descend a flight of stairs using surjit Hrs mod I.  (Not Progressing)       Start:  02/14/24    Expected End:  02/21/24               Pain - Adult          Transfers       STG - Patient will perform bed mobility independently.  (Not Progressing)       Start:  02/14/24    Expected End:  02/21/24            STG - Patient will transfer sit to and from stand mod I with LRAD. (Not Progressing)       Start:  02/14/24    Expected End:  02/21/24

## 2024-02-22 NOTE — CARE PLAN
Problem: Pain - Adult  Goal: Verbalizes/displays adequate comfort level or baseline comfort level  Outcome: Progressing     Problem: Safety - Adult  Goal: Free from fall injury  Outcome: Progressing     Problem: Discharge Planning  Goal: Discharge to home or other facility with appropriate resources  Outcome: Progressing     Problem: Chronic Conditions and Co-morbidities  Goal: Patient's chronic conditions and co-morbidity symptoms are monitored and maintained or improved  Outcome: Progressing     Problem: Skin  Goal: Decreased wound size/increased tissue granulation at next dressing change  Outcome: Progressing  Goal: Participates in plan/prevention/treatment measures  Outcome: Progressing  Goal: Prevent/manage excess moisture  Outcome: Progressing  Goal: Prevent/minimize sheer/friction injuries  Outcome: Progressing  Goal: Promote/optimize nutrition  Outcome: Progressing  Goal: Promote skin healing  Outcome: Progressing  Flowsheets (Taken 2/21/2024 2227)  Promote skin healing: Turn/reposition every 2 hours/use positioning/transfer devices     Problem: Safety - Medical Restraint  Goal: Remains free of injury from restraints (Restraint for Interference with Medical Device)  Outcome: Progressing  Goal: Free from restraint(s) (Restraint for Interference with Medical Device)  Outcome: Progressing   The patient's goals for the shift include Sleep    The clinical goals for the shift include Pt will remain safe and not fall during shift    Over the shift, the patient did not make progress toward the following goals. Barriers to progression include . Recommendations to address these barriers include .

## 2024-02-22 NOTE — PROGRESS NOTES
Occupational Therapy    Occupational Therapy Treatment    Name: Heather Romo  MRN: [id number]  : 1950  Date: 24  Time Calculation  Start Time: 1016  Stop Time: 1041  Time Calculation (min): 25 min    Assessment:  OT Assessment: Pt continues to present with impaired cognition, decreased memory and orientation. Pt has poor safety awareness and insight.  Pt unable to participate in cognitive assessment.  Pt seems to require extensive assistance for mobility compared to initial evaluation.  Prognosis: Fair  Evaluation/Treatment Tolerance: Patient limited by fatigue  Medical Staff Made Aware: Yes  End of Session Communication: Bedside nurse  End of Session Patient Position: Bed, 3 rail up, Alarm on  Plan:  Treatment Interventions: ADL retraining, UE strengthening/ROM, Endurance training, Cognitive reorientation, Patient/family training, Equipment evaluation/education, Neuromuscular reeducation, Compensatory technique education, Fine motor coordination activities  OT Frequency: 3 times per week  OT Discharge Recommendations: Moderate intensity level of continued care  OT Recommended Transfer Status: Maximum assist, Dependent, Assist of 2  OT - OK to Discharge: Yes    Subjective   OT Received On: 24    General:  General  Reason for Referral: OT for ADL and safety; pt presents with altered mental status  Referred By: Katy  Past Medical History Relevant to Rehab: see medical chart  Family/Caregiver Present: No  Co-Treatment: PT  Co-Treatment Reason: To increase patient safety, transfer ability, and participation.  Prior to Session Communication: Bedside nurse  Patient Position Received: Bed, 4 rail up, Alarm on  Preferred Learning Style: verbal  General Comment: Pt follow up treatment and order for MoCA; pt no longer in restraints.  Pt appeared to be sleeping and required moderate verbal and tactile cues for pt to respond.  Precautions:  Medical Precautions: Fall precautions     Pain  "Assessment:  Pain Assessment  Pain Assessment: 0-10  Pain Score: 0 - No pain     Objective   Activities of Daily Living: Feeding  Feeding Comments: Breakfast tray set up for pt; pt made no initiation to eat her meal; she verbally stated she wanted to eat but stated \"maybe in a bit\"    Grooming  Grooming Comments: Pt hair disheveled; brush at pt bedside. Attempted to present pt with brush; attempted hand over hand assistance to brush hair. Pt seemed to resist and hairbrushing was not completed.    LE Dressing  LE Dressing: Yes  Sock Level of Assistance: Dependent  LE Dressing Where Assessed: Edge of bed    Toileting  Toileting Adaptive Equipment:  (Fancy Hands)  Toileting Level of Assistance: Dependent  Toileting Comments: pt does not indicate when she needs to use the bathroom    Functional Standing Tolerance:  Functional Standing Tolerance  Functional Standing Tolerance Comments: Pt unable to stand for functional tasks at this time  Bed Mobility/Transfers: Bed Mobility  Bed Mobility: Yes  Bed Mobility 1  Bed Mobility 1: Supine to sitting  Level of Assistance 1: Maximum assistance, Dependent, Maximum verbal cues  Bed Mobility Comments 1: with HOB elevated    Transfers  Transfer: Yes  Transfer 1  Technique 1: Sit to stand, Stand to sit, Lateral  Transfer Device 1: Gait belt (initial transfer attempted with gait belt and walker; pt with decreased  and dependent to stand; walker not used to complete transfer)  Transfer Level of Assistance 1: Maximum assistance, Dependent, +2, Maximum tactile cues, Maximum verbal cues  Transfers 2  Transfer From 2: Bed to  Transfer to 2: Chair with arms  Technique 2: Sit pivot, Lateral, To left  Transfer Device 2: Gait belt  Transfer Level of Assistance 2: Dependent, Maximum verbal cues, Maximum tactile cues, +2  Trials/Comments 2: Bed elevated; arm of chair raised  Transfers 3  Transfer From 3: Stand to  Transfer to 3: Sit, Chair with arms  Transfer Device 3: Gait belt  Transfer Level " of Assistance 3: Dependent    Sitting Balance:  Static Sitting Balance  Static Sitting-Balance Support: Feet supported (pt corrected self from posterior and right lateral lean less than 50% of the time; tactile and verbal cues given continuously)  Static Sitting-Level of Assistance: Moderate assistance  Static Sitting-Comment/Number of Minutes: pt sat for approximately 10 minutes  Standing Balance:  Static Standing Balance  Static Standing-Balance Support: Bilateral upper extremity supported  Static Standing-Level of Assistance: Dependent  Static Standing-Comment/Number of Minutes: less than 1 minute  Dynamic Standing Balance  Dynamic Standing-Balance Support: Bilateral upper extremity supported  Dynamic Standing-Balance:  (transfer to bedside chair)  Dynamic Standing-Comments: dependent; less than 30 seconds    Therapy/Activity: Therapeutic Exercise  Therapeutic Exercise Performed:  (attempted to perform AAROM/PROM for BUEs; pt resisted and pulled upper extremeties away; pt did not repond to encouragment to complete independently)       Balance/Neuromuscular Re-Education  Balance/Neuromuscular Re-Education Activity Performed: Yes  Balance/Neuromuscular Re-Education Activity 1: sitting EOB with intermittent support/verbal cues       Cognitive Skill Development:  Cognitive Skill Development Activity 1: Orientation to place; situation; day; weather and current events       RUE   RUE : Within Functional Limits and LUE   LUE: Within Functional Limits        Outcome Measures:  [facility] Daily Activity  Putting on and taking off regular lower body clothing: Total  Bathing (including washing, rinsing, drying): Total  Putting on and taking off regular upper body clothing: Total  Toileting, which includes using toilet, bedpan or urinal: Total  Taking care of personal grooming such as brushing teeth: Total  Eating Meals: Total  Daily Activity - Total Score: 6        Education Documentation  ADL Training, taught by Enid Cohen OT  at 2/22/2024 12:35 PM.  Learner: Patient  Readiness: Acceptance  Method: Explanation  Response: No Evidence of Learning, Needs Reinforcement    Education Comments  No comments found.      Goals:  Encounter Problems       Encounter Problems (Active)       ADLs       Patient will perform UB and LB sponge bathing  with setup assist level of assistance. (Not Progressing)       Start:  02/14/24    Expected End:  03/07/24            Patient will complete daily grooming tasks with modified independent level of assistance and PRN adaptive equipment while standing. (Not Progressing)       Start:  02/14/24    Expected End:  03/07/24            Patient will complete toileting including hygiene clothing management/hygiene with modified independent level of assistance and raised toilet seat and grab bars. (Not Progressing)       Start:  02/14/24    Expected End:  03/07/24                 COGNITION/SAFETY       Patient will follow 100% Two step commands to allow improved ADL performance. (Not Progressing)       Start:  02/14/24    Expected End:  03/07/24            Patient will demonstrated orientation x 3 with 1 verbal cue. (Not Progressing)       Start:  02/14/24    Expected End:  03/07/24       ORIENTATION            MOBILITY       Patient will perform Functional mobility  Household distances/Community Distances with  mod independence level of assistance and least restrictive device in order to improve safety and functional mobility. (Not Progressing)       Start:  02/14/24    Expected End:  03/07/24

## 2024-02-23 LAB
ALBUMIN SERPL BCP-MCNC: 2.8 G/DL (ref 3.4–5)
ALP SERPL-CCNC: 63 U/L (ref 33–136)
ALT SERPL W P-5'-P-CCNC: 22 U/L (ref 7–45)
ANION GAP SERPL CALC-SCNC: 11 MMOL/L (ref 10–20)
AST SERPL W P-5'-P-CCNC: 32 U/L (ref 9–39)
BILIRUB SERPL-MCNC: 0.6 MG/DL (ref 0–1.2)
BUN SERPL-MCNC: 44 MG/DL (ref 6–23)
CALCIUM SERPL-MCNC: 8.8 MG/DL (ref 8.6–10.3)
CHLORIDE SERPL-SCNC: 108 MMOL/L (ref 98–107)
CO2 SERPL-SCNC: 25 MMOL/L (ref 21–32)
CREAT SERPL-MCNC: 1.56 MG/DL (ref 0.5–1.05)
EGFRCR SERPLBLD CKD-EPI 2021: 35 ML/MIN/1.73M*2
GLUCOSE SERPL-MCNC: 85 MG/DL (ref 74–99)
HOLD SPECIMEN: NORMAL
POTASSIUM SERPL-SCNC: 3.8 MMOL/L (ref 3.5–5.3)
PROT SERPL-MCNC: 6 G/DL (ref 6.4–8.2)
SODIUM SERPL-SCNC: 140 MMOL/L (ref 136–145)

## 2024-02-23 PROCEDURE — 2500000004 HC RX 250 GENERAL PHARMACY W/ HCPCS (ALT 636 FOR OP/ED): Performed by: PHYSICIAN ASSISTANT

## 2024-02-23 PROCEDURE — 2500000004 HC RX 250 GENERAL PHARMACY W/ HCPCS (ALT 636 FOR OP/ED): Performed by: PSYCHIATRY & NEUROLOGY

## 2024-02-23 PROCEDURE — 1200000002 HC GENERAL ROOM WITH TELEMETRY DAILY

## 2024-02-23 PROCEDURE — 36415 COLL VENOUS BLD VENIPUNCTURE: CPT | Performed by: INTERNAL MEDICINE

## 2024-02-23 PROCEDURE — 99232 SBSQ HOSP IP/OBS MODERATE 35: CPT | Performed by: INTERNAL MEDICINE

## 2024-02-23 PROCEDURE — 2500000001 HC RX 250 WO HCPCS SELF ADMINISTERED DRUGS (ALT 637 FOR MEDICARE OP): Performed by: PHYSICIAN ASSISTANT

## 2024-02-23 PROCEDURE — 80053 COMPREHEN METABOLIC PANEL: CPT | Performed by: INTERNAL MEDICINE

## 2024-02-23 RX ADMIN — THIAMINE HYDROCHLORIDE 500 MG: 100 INJECTION, SOLUTION INTRAMUSCULAR; INTRAVENOUS at 11:47

## 2024-02-23 RX ADMIN — ENOXAPARIN SODIUM 40 MG: 40 INJECTION SUBCUTANEOUS at 00:15

## 2024-02-23 RX ADMIN — THIAMINE HYDROCHLORIDE 500 MG: 100 INJECTION, SOLUTION INTRAMUSCULAR; INTRAVENOUS at 16:31

## 2024-02-23 RX ADMIN — STANDARDIZED SENNA CONCENTRATE 17.2 MG: 8.6 TABLET ORAL at 20:57

## 2024-02-23 RX ADMIN — THIAMINE HYDROCHLORIDE 500 MG: 100 INJECTION, SOLUTION INTRAMUSCULAR; INTRAVENOUS at 22:57

## 2024-02-23 RX ADMIN — ATORVASTATIN CALCIUM 80 MG: 80 TABLET, FILM COATED ORAL at 20:56

## 2024-02-23 ASSESSMENT — COGNITIVE AND FUNCTIONAL STATUS - GENERAL
PERSONAL GROOMING: TOTAL
STANDING UP FROM CHAIR USING ARMS: TOTAL
PERSONAL GROOMING: TOTAL
EATING MEALS: TOTAL
TOILETING: TOTAL
MOBILITY SCORE: 6
MOBILITY SCORE: 6
DAILY ACTIVITIY SCORE: 6
MOVING TO AND FROM BED TO CHAIR: TOTAL
EATING MEALS: TOTAL
DAILY ACTIVITIY SCORE: 6
HELP NEEDED FOR BATHING: TOTAL
DRESSING REGULAR LOWER BODY CLOTHING: TOTAL
WALKING IN HOSPITAL ROOM: TOTAL
CLIMB 3 TO 5 STEPS WITH RAILING: TOTAL
TURNING FROM BACK TO SIDE WHILE IN FLAT BAD: TOTAL
STANDING UP FROM CHAIR USING ARMS: TOTAL
MOVING FROM LYING ON BACK TO SITTING ON SIDE OF FLAT BED WITH BEDRAILS: TOTAL
DRESSING REGULAR LOWER BODY CLOTHING: TOTAL
MOVING TO AND FROM BED TO CHAIR: TOTAL
HELP NEEDED FOR BATHING: TOTAL
TOILETING: TOTAL
CLIMB 3 TO 5 STEPS WITH RAILING: TOTAL
DRESSING REGULAR UPPER BODY CLOTHING: TOTAL
WALKING IN HOSPITAL ROOM: TOTAL
DRESSING REGULAR UPPER BODY CLOTHING: TOTAL
MOVING FROM LYING ON BACK TO SITTING ON SIDE OF FLAT BED WITH BEDRAILS: TOTAL
TURNING FROM BACK TO SIDE WHILE IN FLAT BAD: TOTAL

## 2024-02-23 ASSESSMENT — PAIN - FUNCTIONAL ASSESSMENT
PAIN_FUNCTIONAL_ASSESSMENT: 0-10
PAIN_FUNCTIONAL_ASSESSMENT: 0-10

## 2024-02-23 ASSESSMENT — PAIN SCALES - GENERAL
PAINLEVEL_OUTOF10: 0 - NO PAIN
PAINLEVEL_OUTOF10: 0 - NO PAIN

## 2024-02-23 NOTE — PROGRESS NOTES
Nutrition Follow-up Note    Chart reviewed and pt visited.  Per notes, flowsheets and observation, continued poor intake.  Pt at moderate risk for refeeding.  Should pt intake remain sub optimal, consider alternate route for nutrition vs goals of care discussion.    Pt agreeable to supplement while admitted.    RDN recommendations:  Should pt require enteral nutrition: Jevity 1.5, goal rate 55ml/hr. (Start at 10 ml/hr and increase by 10ml/hr every 12 hrs unitl goal rate has been met. Formula at goal rate covers 100% of pts nutritional needs. Formulat at goal rate provides: 1980kcal, 84.2g protein and 1003ml h20. Flushes per MD rec or 150ml x 6)    Results for orders placed or performed during the hospital encounter of 02/13/24 (from the past 24 hour(s))   Comprehensive metabolic panel   Result Value Ref Range    Glucose 85 74 - 99 mg/dL    Sodium 140 136 - 145 mmol/L    Potassium 3.8 3.5 - 5.3 mmol/L    Chloride 108 (H) 98 - 107 mmol/L    Bicarbonate 25 21 - 32 mmol/L    Anion Gap 11 10 - 20 mmol/L    Urea Nitrogen 44 (H) 6 - 23 mg/dL    Creatinine 1.56 (H) 0.50 - 1.05 mg/dL    eGFR 35 (L) >60 mL/min/1.73m*2    Calcium 8.8 8.6 - 10.3 mg/dL    Albumin 2.8 (L) 3.4 - 5.0 g/dL    Alkaline Phosphatase 63 33 - 136 U/L    Total Protein 6.0 (L) 6.4 - 8.2 g/dL    AST 32 9 - 39 U/L    Bilirubin, Total 0.6 0.0 - 1.2 mg/dL    ALT 22 7 - 45 U/L   Lavender Top   Result Value Ref Range    Extra Tube Hold for add-ons.      Scheduled medications  atorvastatin, 80 mg, oral, Nightly  enoxaparin, 40 mg, subcutaneous, q24h  lisinopril, 10 mg, oral, Daily  [Held by provider] melatonin, 3 mg, oral, Daily  [Held by provider] mirtazapine, 7.5 mg, oral, Nightly  sennosides, 2 tablet, oral, BID  thiamine, 500 mg, intravenous, TID      Continuous medications     PRN medications  PRN medications: acetaminophen **OR** acetaminophen **OR** acetaminophen, haloperidol lactate, hydrALAZINE, hydrALAZINE  Dietary Orders (From admission, onward)        "Start     Ordered    02/23/24 1425  Oral nutritional supplements  Until discontinued        Question Answer Comment   Deliver with All meals    Select supplement: Ensure Plus High Protein        02/23/24 1424    02/18/24 0032  Oral nutritional supplements  Until discontinued        Comments: chocolate   Question Answer Comment   Deliver with All meals    Select supplement: Magic Cup        02/18/24 0035    02/14/24 0519  May Participate in Room Service  Once        Question:  .  Answer:  Yes    02/14/24 0520    02/14/24 0030  Adult diet Regular  Diet effective now        Question:  Diet type  Answer:  Regular    02/14/24 0029                  History:  Food and Nutrient History  Energy Intake: Poor < 50 %  Food and Nutrient History: Since admission- 14- poor, 15- poor, 18- poor, 19- poor, 20- poor, 21- poor, 22- poor, 23- poor    Anthropometrics:  Height: 160 cm (5' 3\")  Weight: 74 kg (163 lb 1.6 oz)  BMI (Calculated): 28.9    Wt Readings from Last 2 Encounters:   02/13/24 74 kg (163 lb 1.6 oz)   02/10/23 74.1 kg (163 lb 5 oz)     Weight Change  Significant Weight Loss: No    Estimated Energy Needs  Total Energy Estimated Needs (kCal): 1850 kCal  Total Estimated Energy Need per Day (kCal/kg): 2220 kCal/kg  Method for Estimating Needs: 25-30    Estimated Protein Needs  Total Protein Estimated Needs (g): 60 g  Total Protein Estimated Needs (g/kg): 75 g/kg  Method for Estimating Needs: 0.8-1.0    Estimated Fluid Needs  Method for Estimating Needs: 1ml/kcal or per MD    Nutrition Focused Physical Findings:  Subcutaneous Fat Loss  Orbital Fat Pads: Mild-Moderate (slight dark circles and slight hollowing)  Buccal Fat Pads: Mild-Moderate (flat cheeks, minimal bounce)    Muscle Wasting  Temporalis: Well nourished (well-defined muscle)    Edema  Edema: none    Physical Findings (Nutrition Deficiency/Toxicity)  Skin: Positive (bruising)     Nutrition Diagnosis   Malnutrition Diagnosis  Patient has Malnutrition Diagnosis: " Yes  Diagnosis Status: Ongoing  Malnutrition Diagnosis: Severe malnutrition related to acute disease or injury  As Evidenced by: prolonged poor intake of < 50% of estimated energy needs in > 5 days and mild to moderate subcutaneous fat loss present    Nutrition Interventions/Recommendations   Nutrition Prescription  Individualized Nutrition Prescription Provided for : Should pt intake remain sub optimal, consider alternate route for nutrition vs goals of care discussion; Should pt require enteral nutrition: Jevity 1.5, goal rate 55ml/hr. (Start at 10 ml/hr and increase by 10ml/hr every 12 hrs unitl goal rate has been met. Formula at goal rate covers 100% of pts nutritional needs. Formulat at goal rate provides: 1980kcal, 84.2g protein and 1003ml h20. Flushes per MD rec or 150ml x 6)    Food and/or Nutrient Delivery Interventions  Interventions: Meals and snacks, Medical food supplement    Meals and Snacks: General healthful diet    Medical Food Supplement: Commercial beverage  Goal: Magic Cup TID for encouraged intake; Ensure TID for encouraged intake    Additional Interventions: RFP and Mag daily    Coordination of Nutrition Care by a Nutrition Professional  Collaboration and Referral of Nutrition Care: Collaboration by nutrition professional with other providers    Education Documentation  No documentation found.      Nutrition Monitoring and Evaluation   Food and Nutrient Related History  Energy Intake: Estimated energy intake    Fluid Intake: Estimated fluid intake    Amount of Food: Estimated amout of food, Medical food intake    Mealtime Behavior: Limited number of accepted foods    Anthropometrics: Body Composition/Growth/Weight History  Weight Change: Weight gain, Weight loss    Biochemical Data, Medical Tests and Procedures  Electrolyte and Renal Panel: Other (Comment)  Criteria: as clinically indicated    Gastrointestinal Profile: Other (Comment)  Criteria: as clinically indicated    Glucose/Endocrine  Profile: Other (Comment)  Criteria: as clinically indicated    Nutritional Anemia Profile: Other (Comment)  Criteria: as clinically indicated    Vitamin Profile: Other (Comment)  Criteria: as clinically indicated    Nutrition Focused Physical Findings  Adipose: Loss of subcutaneous fat    Digestive System: Anorexia    Other: Stool output, Urine volume, Overall appearance    Follow Up  Time Spent (min): 45 minutes  Last Date of Nutrition Visit: 02/23/24  Nutrition Follow-Up Needed?: Dietitian to reassess per policy  Follow up Comment: MAMIE SOMMERS recs

## 2024-02-23 NOTE — PROGRESS NOTES
Occupational Therapy                 Therapy Communication Note    Patient Name: Heather Romo  MRN: [id number]  Today's Date: 2/23/2024     Discipline: Occupational Therapy    Missed Visit Reason: Missed Visit Reason: Patient refused (Pt attempted to be seen for MOCA. Pt cleared by nursing. Pt not following commands, able to state name. closing eyes and turning away from OT. Conyacted Dr aYng to update pt decreased participation/ ability to complete MOCA at this time)    Missed Time: Attempt    Comment: Nursing aware

## 2024-02-23 NOTE — CARE PLAN
The patient's goals for the shift include Sleep    The clinical goals for the shift include Patient will remain free from falls by the end of shift

## 2024-02-23 NOTE — PROGRESS NOTES
Heather Romo is a 73 y.o. female on day 6 of admission presenting with Altered mental status, unspecified altered mental status type.      Subjective   Patient was seen and examined at bedside this morning, was responding to voice command, conversational, but stated that she had a poor appetite and does not feel like eating.       Objective     Last Recorded Vitals  /77 (BP Location: Right arm, Patient Position: Lying)   Pulse 104   Temp 37.4 °C (99.3 °F) (Temporal)   Resp 18   Wt 74 kg (163 lb 1.6 oz)   SpO2 94%   Intake/Output last 3 Shifts:  No intake or output data in the 24 hours ending 02/22/24 2326    Admission Weight  Weight: 74 kg (163 lb 1.6 oz) (02/13/24 1617)    Daily Weight  02/13/24 : 74 kg (163 lb 1.6 oz)    Image Results  EEG  IMPRESSION    This EEG is indicative of severe diffuse encephalopathy. No epileptiform abnormalities or lateralizing signs noted.    A full report will be scanned into the patient's chart at a later time.    This report has been interpreted and electronically signed by      Physical Exam  Constitutional: Elderly female, not responding to voice command appears stated, with intact muscle tone, not in acute distress  Eyes: PERRLA, clear sclera  ENMT: Moist mucosal membranes, no exudate  Head / Neck: Atraumatic, normocephalic, supple neck, JVP not visualized  Lungs: Patent airways, CTABL  Heart: RRR, S1S2, no murmurs appreciated, palpable pulses in all extremities  GI: Soft, NT, ND, bowel sounds present in all quadrants  MSK: Unable to complete due to lack of cooperation  Extremities: Intact x 4, no peripheral edema  : No Jones catheter inserted  Breast: Deferred  Neurological: AAO x 0,  facial muscles symmetrical, withdraws to pain stimuli, muscle tone intact, resists limb mobilization  Psychological: Appropriate mood and behavior  Relevant Results             Scheduled medications  atorvastatin, 80 mg, oral, Nightly  enoxaparin, 40 mg, subcutaneous,  q24h  lisinopril, 10 mg, oral, Daily  [Held by provider] melatonin, 3 mg, oral, Daily  [Held by provider] mirtazapine, 7.5 mg, oral, Nightly  sennosides, 2 tablet, oral, BID  thiamine, 500 mg, intravenous, TID      Continuous medications     PRN medications  PRN medications: acetaminophen **OR** acetaminophen **OR** acetaminophen, haloperidol lactate, hydrALAZINE, hydrALAZINE    Assessment/Plan        73 y.o. female presenting with altered mental status and confusion.  This patient has a history of a remote TIA about 10 years ago, hypertension, hyperlipidemia, hyperglycemia, CKD and Charcot-Rylie-Tooth syndrome with foot drop.  History is provided by the patient but also by her sister.  Patient lives alone in her own home.  The sister states that 8 days ago the patient was not answering her phone which is unusual for her.         Principal Problem:    Altered mental status, unspecified altered mental status type  Active Problems:    Confusion    AMS (altered mental status)    Altered mental status  Alzheimer's dementia?  -Today alert, conversational, whereas yesterday she was more somnolent and sedated, not responding to voice command unclear rationale of change in mental state, no medications or lab abnormality seem to support medication or metabolic induced encephalopathy.  Psychotic disorder: Continue mirtazapine 7.5 mg at bedtime, Haldol 1 mg IV every 8 hours as needed agitation  Catatonia  -MRI brain negative for acute process  -neurology consult: Appreciate management recommendations, reconsulted for persistent altered mental status  -EEG for further evaluation negative for seizures  -psych consult: Recommend geropsych admission  -pt off soft restraints     UTI  -urine cx negative  -ceftriaxone stopped     HTN  -continue lisinopril 10 mg daily     HLD  -continue statin, atorvastatin 80 mg daily     Diet  -Regular     DVT ppx  -Lovenox 40 mg subcu daily     Disposition: Presenting with catatonia, with altered  mental status, gradually improving, discharge pending placement to [facility]        Malnutrition Diagnosis Status: New  Malnutrition Diagnosis: Severe malnutrition related to acute disease or injury  As Evidenced by: prolonged poor intake of < 50% of estimated energy needs in > 5 days and mild to moderate subcutaneous fat loss present  I agree with the dietitian's malnutrition diagnosis.         Gil Mora, DO

## 2024-02-24 LAB
ALBUMIN SERPL BCP-MCNC: 2.9 G/DL (ref 3.4–5)
ANION GAP SERPL CALC-SCNC: 16 MMOL/L (ref 10–20)
BUN SERPL-MCNC: 36 MG/DL (ref 6–23)
CALCIUM SERPL-MCNC: 8.9 MG/DL (ref 8.6–10.3)
CHLORIDE SERPL-SCNC: 107 MMOL/L (ref 98–107)
CO2 SERPL-SCNC: 20 MMOL/L (ref 21–32)
CREAT SERPL-MCNC: 1.3 MG/DL (ref 0.5–1.05)
EGFRCR SERPLBLD CKD-EPI 2021: 44 ML/MIN/1.73M*2
ERYTHROCYTE [DISTWIDTH] IN BLOOD BY AUTOMATED COUNT: 13 % (ref 11.5–14.5)
GLUCOSE SERPL-MCNC: 87 MG/DL (ref 74–99)
HCT VFR BLD AUTO: 36 % (ref 36–46)
HGB BLD-MCNC: 11.6 G/DL (ref 12–16)
MCH RBC QN AUTO: 31.4 PG (ref 26–34)
MCHC RBC AUTO-ENTMCNC: 32.2 G/DL (ref 32–36)
MCV RBC AUTO: 97 FL (ref 80–100)
NRBC BLD-RTO: 0 /100 WBCS (ref 0–0)
PHOSPHATE SERPL-MCNC: 2.8 MG/DL (ref 2.5–4.9)
PLATELET # BLD AUTO: 338 X10*3/UL (ref 150–450)
POTASSIUM SERPL-SCNC: 3.7 MMOL/L (ref 3.5–5.3)
RBC # BLD AUTO: 3.7 X10*6/UL (ref 4–5.2)
SODIUM SERPL-SCNC: 139 MMOL/L (ref 136–145)
WBC # BLD AUTO: 8.7 X10*3/UL (ref 4.4–11.3)

## 2024-02-24 PROCEDURE — 2500000001 HC RX 250 WO HCPCS SELF ADMINISTERED DRUGS (ALT 637 FOR MEDICARE OP): Performed by: PHYSICIAN ASSISTANT

## 2024-02-24 PROCEDURE — 80069 RENAL FUNCTION PANEL: CPT | Performed by: INTERNAL MEDICINE

## 2024-02-24 PROCEDURE — 1200000002 HC GENERAL ROOM WITH TELEMETRY DAILY

## 2024-02-24 PROCEDURE — 99232 SBSQ HOSP IP/OBS MODERATE 35: CPT | Performed by: INTERNAL MEDICINE

## 2024-02-24 PROCEDURE — 36415 COLL VENOUS BLD VENIPUNCTURE: CPT | Performed by: INTERNAL MEDICINE

## 2024-02-24 PROCEDURE — 2500000001 HC RX 250 WO HCPCS SELF ADMINISTERED DRUGS (ALT 637 FOR MEDICARE OP): Performed by: PSYCHIATRY & NEUROLOGY

## 2024-02-24 PROCEDURE — 85027 COMPLETE CBC AUTOMATED: CPT | Performed by: INTERNAL MEDICINE

## 2024-02-24 PROCEDURE — 2500000004 HC RX 250 GENERAL PHARMACY W/ HCPCS (ALT 636 FOR OP/ED): Performed by: PHYSICIAN ASSISTANT

## 2024-02-24 PROCEDURE — 2500000004 HC RX 250 GENERAL PHARMACY W/ HCPCS (ALT 636 FOR OP/ED): Performed by: PSYCHIATRY & NEUROLOGY

## 2024-02-24 RX ORDER — TALC
3 POWDER (GRAM) TOPICAL NIGHTLY PRN
Status: DISCONTINUED | OUTPATIENT
Start: 2024-02-24 | End: 2024-02-27 | Stop reason: HOSPADM

## 2024-02-24 RX ADMIN — THIAMINE HYDROCHLORIDE 500 MG: 100 INJECTION, SOLUTION INTRAMUSCULAR; INTRAVENOUS at 09:20

## 2024-02-24 RX ADMIN — Medication 3 MG: at 20:26

## 2024-02-24 RX ADMIN — LISINOPRIL 10 MG: 10 TABLET ORAL at 09:20

## 2024-02-24 RX ADMIN — STANDARDIZED SENNA CONCENTRATE 17.2 MG: 8.6 TABLET ORAL at 20:26

## 2024-02-24 RX ADMIN — ENOXAPARIN SODIUM 40 MG: 40 INJECTION SUBCUTANEOUS at 00:29

## 2024-02-24 RX ADMIN — HALOPERIDOL LACTATE 1 MG: 5 INJECTION, SOLUTION INTRAMUSCULAR at 20:40

## 2024-02-24 RX ADMIN — ATORVASTATIN CALCIUM 80 MG: 80 TABLET, FILM COATED ORAL at 20:26

## 2024-02-24 ASSESSMENT — PAIN SCALES - PAIN ASSESSMENT IN ADVANCED DEMENTIA (PAINAD)
CONSOLABILITY: NO NEED TO CONSOLE
NEGVOCALIZATION: OCCASIONAL MOAN/GROAN, LOW SPEECH, NEGATIVE/DISAPPROVING QUALITY
TOTALSCORE: 2
BODYLANGUAGE: TENSE, DISTRESSED PACING, FIDGETING
FACIALEXPRESSION: SMILING OR INEXPRESSIVE
BREATHING: NORMAL

## 2024-02-24 ASSESSMENT — COGNITIVE AND FUNCTIONAL STATUS - GENERAL
TOILETING: TOTAL
PERSONAL GROOMING: A LOT
TURNING FROM BACK TO SIDE WHILE IN FLAT BAD: TOTAL
DRESSING REGULAR UPPER BODY CLOTHING: TOTAL
DAILY ACTIVITIY SCORE: 8
EATING MEALS: A LOT
WALKING IN HOSPITAL ROOM: TOTAL
MOBILITY SCORE: 8
STANDING UP FROM CHAIR USING ARMS: TOTAL
MOVING TO AND FROM BED TO CHAIR: TOTAL
CLIMB 3 TO 5 STEPS WITH RAILING: TOTAL
DRESSING REGULAR LOWER BODY CLOTHING: TOTAL
HELP NEEDED FOR BATHING: TOTAL
MOVING FROM LYING ON BACK TO SITTING ON SIDE OF FLAT BED WITH BEDRAILS: A LITTLE

## 2024-02-24 NOTE — PROGRESS NOTES
02/24/24 1618   Discharge Planning   Patient expects to be discharged to: Geropsych-when medically ready, nutitionalal intervention discussed  d/t poor PO intake

## 2024-02-24 NOTE — PROGRESS NOTES
"Heather Romo is a 73 y.o. female on day 7 of admission presenting with Altered mental status, unspecified altered mental status type.      Subjective   Was seen and examined bedside this morning, was responsive to voice command, conversational, however she was not interested in eating.  When asked why do not eat she responded \"I do not know\" or would simply pause and stares at me.       Objective     Last Recorded Vitals  /73 (BP Location: Right arm, Patient Position: Lying)   Pulse 98   Temp 36.5 °C (97.7 °F) (Temporal)   Resp 18   Wt 74 kg (163 lb 1.6 oz)   SpO2 93%   Intake/Output last 3 Shifts:    Intake/Output Summary (Last 24 hours) at 2/23/2024 1923  Last data filed at 2/23/2024 1313  Gross per 24 hour   Intake 240 ml   Output --   Net 240 ml       Admission Weight  Weight: 74 kg (163 lb 1.6 oz) (02/13/24 1617)    Daily Weight  02/13/24 : 74 kg (163 lb 1.6 oz)    Image Results  EEG  IMPRESSION    This EEG is indicative of severe diffuse encephalopathy. No epileptiform abnormalities or lateralizing signs noted.    A full report will be scanned into the patient's chart at a later time.    This report has been interpreted and electronically signed by      Physical Exam  Constitutional: Elderly female, alert, conversational, but does not follow command when asked to eat.  Not in acute distress  Eyes: PERRLA, clear sclera  ENMT: Moist mucosal membranes, no exudate  Head / Neck: Atraumatic, normocephalic, supple neck, JVP not visualized  Lungs: Patent airways, CTABL  Heart: RRR, S1S2, no murmurs appreciated, palpable pulses in all extremities  GI: Soft, NT, ND, bowel sounds present in all quadrants  MSK: Moderate muscle wasting, moves all extremities freely  Extremities: Moderate muscle wasting in the extremities, no peripheral edema  : No Jones catheter inserted  Breast: Deferred  Neurological: AAO x 0,  facial muscles symmetrical, withdraws to pain stimuli, muscle tone intact, resists limb " mobilization  Psychological: Appropriate mood and behavior    Relevant Results           Scheduled medications  atorvastatin, 80 mg, oral, Nightly  enoxaparin, 40 mg, subcutaneous, q24h  lisinopril, 10 mg, oral, Daily  [Held by provider] melatonin, 3 mg, oral, Daily  [Held by provider] mirtazapine, 7.5 mg, oral, Nightly  sennosides, 2 tablet, oral, BID  thiamine, 500 mg, intravenous, TID      Continuous medications     PRN medications  PRN medications: acetaminophen **OR** acetaminophen **OR** acetaminophen, haloperidol lactate, hydrALAZINE, hydrALAZINE  EEG    Result Date: 2/21/2024  IMPRESSION This EEG is indicative of severe diffuse encephalopathy. No epileptiform abnormalities or lateralizing signs noted. A full report will be scanned into the patient's chart at a later time. This report has been interpreted and electronically signed by    Electrocardiogram, 12-lead PRN ACS symptoms    Result Date: 2/20/2024  Sinus tachycardia with Premature supraventricular complexes and Fusion complexes ST elevation, consider inferior injury or acute infarct ** ** ACUTE MI ** ** Consider right ventricular involvement in acute inferior infarct Abnormal ECG When compared with ECG of 13-FEB-2024 23:49, Significant changes have occurred    MR brain wo IV contrast    Result Date: 2/20/2024  Interpreted By:  Jeromy Wallace, STUDY: MR BRAIN WO IV CONTRAST;  2/19/2024 7:57 pm   INDICATION: Signs/Symptoms:altered mental status, confusion, hx of remote TIA.   COMPARISON: CT dated 02/13/2024. MRI dated 08/19/2018.   ACCESSION NUMBER(S): [id number]   ORDERING CLINICIAN: WONG WEST   TECHNIQUE: Axial diffusion, axial T2, axial FLAIR, axial gradient echo T2, coronal T1, and sagittal T1 weighted MRI images of the brain were obtained without intravenous contrast administration.   FINDINGS: The diffusion weighted images fail to demonstrate evidence of abnormal diffusion restriction to suggest acute infarction.   There is moderate brain  parenchymal volume loss.   The lateral ventricles demonstrate mild disproportionate prominence when compared with sulci within cerebral convexities. No obstructing mass lesion is noted on this noncontrast MRI study. This mild disproportionate ventriculomegaly may be related to more pronounced central volume loss versus a component of communicating hydrocephalus.   There is an area of encephalomalacia/gliosis within the left frontal corona radiata and left basal ganglia with compensatory dilatation of the adjacent left lateral ventricle.   There are nonspecific white matter changes noted within the cerebral hemispheres bilaterally more pronounced when compared with the prior MRI dated 08/19/2015 which while nonspecific, given the patient's age, likely represent sequelae of more remote small-vessel ischemic change.   Additional small foci of bright signal on the T2 weighted images are identified within the bilateral subinsular regions, right basal ganglia, and bilateral thalami suggesting incidental mildly prominent perivascular spaces and/or scattered more remote lacunar infarctions.   There is persistent minimal bowing of the septum pellucidum to the left of midline. The suprasellar/basilar cisterns are patent.   The paranasal sinuses and mastoid air cells are clear.       There is moderate brain parenchymal volume loss.   The lateral ventricles demonstrate mild disproportionate prominence when compared with sulci within cerebral convexities. No obstructing mass lesion is noted on this noncontrast MRI study. This mild disproportionate ventriculomegaly may be related to more pronounced central volume loss versus a component of communicating hydrocephalus.   There is an area of encephalomalacia/gliosis within the left frontal corona radiata and left basal ganglia with compensatory dilatation of the adjacent left lateral ventricle.   There are nonspecific white matter changes noted within the cerebral hemispheres  bilaterally more pronounced when compared with the prior MRI dated 08/19/2015 which while nonspecific, given the patient's age, likely represent sequelae of more remote small-vessel ischemic change.  Additional small foci of bright signal on the T2 weighted images are identified within the bilateral subinsular regions, right basal ganglia, and bilateral thalami suggesting incidental mildly prominent perivascular spaces and/or scattered more remote lacunar infarctions.     MACRO: None.   Signed by: Jeromy Wallace 2/20/2024 7:45 AM Dictation workstation:   [id number]    ECG 12 lead    Result Date: 2/14/2024  Normal sinus rhythm Normal ECG When compared with ECG of 13-FEB-2024 16:41, (unconfirmed) No significant change was found See ED provider note for full interpretation and clinical correlation Confirmed by Adalberto Godinez ([id number]) on 2/14/2024 3:41:17 PM    ECG 12 lead    Result Date: 2/14/2024  Sinus bradycardia Minimal voltage criteria for LVH, may be normal variant ( R in aVL ) Cannot rule out Anterior infarct , age undetermined Abnormal ECG When compared with ECG of 17-AUG-2015 16:44, T wave amplitude has decreased in Anterior leads See ED provider note for full interpretation and clinical correlation Confirmed by Adalberto Godinez ([id number]) on 2/14/2024 1:46:10 PM    CT head wo IV contrast    Result Date: 2/13/2024  Interpreted By:  Renny Cast, STUDY: CT HEAD WO IV CONTRAST;  2/13/2024 8:30 pm   INDICATION: Signs/Symptoms:AMS.   COMPARISON: MRI 08/2000   ACCESSION NUMBER(S): [id number]   ORDERING CLINICIAN: YOSEF DUNLAP   TECHNIQUE: Noncontrast axial CT scan of head was performed. Angled reformats in brain and bone windows were generated. The images were reviewed in bone, brain, blood and soft tissue windows.   FINDINGS: Global volume loss. Lacunar infarcts seen in the left basal ganglia again seen. Patchy periventricular low attenuation compatible with advanced chronic small vessel ischemic change.  Degree is advanced for age. If concern for subtle acute ischemic changes, consider MRI. No hydrocephalus. Vascular calcification.         Similar appearance with advanced chronic small vessel ischemic change and remote left basal ganglia lacunar infarct. If concern for new ischemic changes, consider MRI. Degree of volume loss vascular changes does appear to be advanced for age.   No evidence of intracranial hemorrhage or displaced skull fracture.   MACRO: None   Signed by: Renny Cast 2/13/2024 9:08 PM Dictation workstation:   [id number]    XR chest 1 view    Result Date: 2/13/2024  Interpreted By:  Kit Woods, STUDY: XR CHEST 1 VIEW;  2/13/2024 7:40 pm   INDICATION: Signs/Symptoms:Falls.   COMPARISON: 8/17/2015   ACCESSION NUMBER(S): [id number]   ORDERING CLINICIAN: YOSEF DUNLAP   FINDINGS: The cardiac silhouette is stable in size. Atherosclerotic calcification of vasculature. No airspace consolidation or pleural effusion. No pneumothorax.       No airspace consolidation or pleural effusion.   MACRO: None   Signed by: Kit Woods 2/13/2024 7:56 PM Dictation workstation:   [id number]       Assessment/Plan      73 y.o. female presenting with altered mental status and confusion.  This patient has a history of a remote TIA about 10 years ago, hypertension, hyperlipidemia, hyperglycemia, CKD and Charcot-Rylie-Tooth syndrome with foot drop.  History is provided by the patient but also by her sister.  Patient lives alone in her own home.  The sister states that 8 days ago the patient was not answering her phone which is unusual for her.         Principal Problem:    Altered mental status, unspecified altered mental status type  Active Problems:    Confusion    AMS (altered mental status)      Altered mental status  Alzheimer's dementia?  -More alert, conversational, but refused to eat  -Nutritionist consulted, recommend considering alternate route of nutrition.  Goal of care discussion  RDN  recommendations:  Should pt require enteral nutrition: Jevity 1.5, goal rate 55ml/hr. (Start at 10 ml/hr and increase by 10ml/hr every 12 hrs unitl goal rate has been met. Formula at goal rate covers 100% of pts nutritional needs. Formulat at goal rate provides: 1980kcal, 84.2g protein and 1003ml h20. Flushes per MD rec or 150ml x 6)  Psychotic disorder: Continue mirtazapine 7.5 mg at bedtime, Haldol 1 mg IV every 8 hours as needed agitation  Catatonia  -MRI brain negative for acute process  -neurology consult: Appreciate management recommendations, reconsulted for persistent altered mental status  -EEG for further evaluation negative for seizures  [facility]  -pt off soft restraints     UTI  -urine cx negative  -ceftriaxone stopped     HTN  -continue lisinopril 10 mg daily     HLD  -continue statin, atorvastatin 80 mg daily     Diet  -Regular     DVT ppx  -Lovenox 40 mg subcu daily     Disposition: Presenting with catatonia, with altered mental status, gradually improving, discharge pending placement to [facility]        Malnutrition Diagnosis Status: New  Malnutrition Diagnosis: Severe malnutrition related to acute disease or injury  As Evidenced by: prolonged poor intake of < 50% of estimated energy needs in > 5 days and mild to moderate subcutaneous fat loss present  I agree with the dietitian's malnutrition diagnosis.            Gil Mora, DO

## 2024-02-25 LAB
ALBUMIN SERPL BCP-MCNC: 2.9 G/DL (ref 3.4–5)
ANION GAP SERPL CALC-SCNC: 13 MMOL/L (ref 10–20)
BUN SERPL-MCNC: 29 MG/DL (ref 6–23)
CALCIUM SERPL-MCNC: 9.4 MG/DL (ref 8.6–10.3)
CHLORIDE SERPL-SCNC: 107 MMOL/L (ref 98–107)
CO2 SERPL-SCNC: 23 MMOL/L (ref 21–32)
CREAT SERPL-MCNC: 1.13 MG/DL (ref 0.5–1.05)
EGFRCR SERPLBLD CKD-EPI 2021: 51 ML/MIN/1.73M*2
ERYTHROCYTE [DISTWIDTH] IN BLOOD BY AUTOMATED COUNT: 12.7 % (ref 11.5–14.5)
GLUCOSE SERPL-MCNC: 107 MG/DL (ref 74–99)
HCT VFR BLD AUTO: 36.4 % (ref 36–46)
HGB BLD-MCNC: 11.6 G/DL (ref 12–16)
MCH RBC QN AUTO: 30.7 PG (ref 26–34)
MCHC RBC AUTO-ENTMCNC: 31.9 G/DL (ref 32–36)
MCV RBC AUTO: 96 FL (ref 80–100)
NRBC BLD-RTO: 0 /100 WBCS (ref 0–0)
PHOSPHATE SERPL-MCNC: 2.9 MG/DL (ref 2.5–4.9)
PLATELET # BLD AUTO: 371 X10*3/UL (ref 150–450)
POTASSIUM SERPL-SCNC: 3.7 MMOL/L (ref 3.5–5.3)
RBC # BLD AUTO: 3.78 X10*6/UL (ref 4–5.2)
SODIUM SERPL-SCNC: 139 MMOL/L (ref 136–145)
WBC # BLD AUTO: 9.3 X10*3/UL (ref 4.4–11.3)

## 2024-02-25 PROCEDURE — 2500000004 HC RX 250 GENERAL PHARMACY W/ HCPCS (ALT 636 FOR OP/ED): Performed by: PSYCHIATRY & NEUROLOGY

## 2024-02-25 PROCEDURE — 2500000004 HC RX 250 GENERAL PHARMACY W/ HCPCS (ALT 636 FOR OP/ED): Performed by: PHYSICIAN ASSISTANT

## 2024-02-25 PROCEDURE — 2500000001 HC RX 250 WO HCPCS SELF ADMINISTERED DRUGS (ALT 637 FOR MEDICARE OP): Performed by: PHYSICIAN ASSISTANT

## 2024-02-25 PROCEDURE — 80069 RENAL FUNCTION PANEL: CPT | Performed by: INTERNAL MEDICINE

## 2024-02-25 PROCEDURE — 2500000001 HC RX 250 WO HCPCS SELF ADMINISTERED DRUGS (ALT 637 FOR MEDICARE OP): Performed by: PSYCHIATRY & NEUROLOGY

## 2024-02-25 PROCEDURE — 36415 COLL VENOUS BLD VENIPUNCTURE: CPT | Performed by: INTERNAL MEDICINE

## 2024-02-25 PROCEDURE — 1200000002 HC GENERAL ROOM WITH TELEMETRY DAILY

## 2024-02-25 PROCEDURE — 85027 COMPLETE CBC AUTOMATED: CPT | Performed by: INTERNAL MEDICINE

## 2024-02-25 PROCEDURE — 99232 SBSQ HOSP IP/OBS MODERATE 35: CPT | Performed by: INTERNAL MEDICINE

## 2024-02-25 RX ORDER — DRONABINOL 2.5 MG/1
2.5 CAPSULE ORAL
Status: DISCONTINUED | OUTPATIENT
Start: 2024-02-26 | End: 2024-02-27 | Stop reason: HOSPADM

## 2024-02-25 RX ADMIN — ATORVASTATIN CALCIUM 80 MG: 80 TABLET, FILM COATED ORAL at 22:41

## 2024-02-25 RX ADMIN — ENOXAPARIN SODIUM 40 MG: 40 INJECTION SUBCUTANEOUS at 01:24

## 2024-02-25 RX ADMIN — HALOPERIDOL LACTATE 1 MG: 5 INJECTION, SOLUTION INTRAMUSCULAR at 18:27

## 2024-02-25 RX ADMIN — Medication 3 MG: at 22:41

## 2024-02-25 ASSESSMENT — COGNITIVE AND FUNCTIONAL STATUS - GENERAL
DAILY ACTIVITIY SCORE: 8
MOBILITY SCORE: 8
EATING MEALS: A LOT
MOVING TO AND FROM BED TO CHAIR: TOTAL
WALKING IN HOSPITAL ROOM: TOTAL
HELP NEEDED FOR BATHING: TOTAL
MOVING FROM LYING ON BACK TO SITTING ON SIDE OF FLAT BED WITH BEDRAILS: A LITTLE
PERSONAL GROOMING: A LOT
DRESSING REGULAR UPPER BODY CLOTHING: TOTAL
DRESSING REGULAR LOWER BODY CLOTHING: TOTAL
TOILETING: TOTAL
TURNING FROM BACK TO SIDE WHILE IN FLAT BAD: TOTAL
STANDING UP FROM CHAIR USING ARMS: TOTAL
CLIMB 3 TO 5 STEPS WITH RAILING: TOTAL

## 2024-02-25 ASSESSMENT — PAIN SCALES - GENERAL
PAINLEVEL_OUTOF10: 0 - NO PAIN
PAINLEVEL_OUTOF10: 0 - NO PAIN

## 2024-02-25 ASSESSMENT — PAIN - FUNCTIONAL ASSESSMENT
PAIN_FUNCTIONAL_ASSESSMENT: 0-10
PAIN_FUNCTIONAL_ASSESSMENT: 0-10

## 2024-02-25 NOTE — CARE PLAN
Problem: Pain - Adult  Goal: Verbalizes/displays adequate comfort level or baseline comfort level  Outcome: Progressing     Problem: Safety - Adult  Goal: Free from fall injury  Outcome: Progressing     Problem: Discharge Planning  Goal: Discharge to home or other facility with appropriate resources  Outcome: Progressing     Problem: Chronic Conditions and Co-morbidities  Goal: Patient's chronic conditions and co-morbidity symptoms are monitored and maintained or improved  Outcome: Progressing     Problem: Skin  Goal: Prevent/manage excess moisture  Flowsheets (Taken 2/25/2024 0024)  Prevent/manage excess moisture:   Moisturize dry skin   Cleanse incontinence/protect with barrier cream

## 2024-02-25 NOTE — PROGRESS NOTES
Heather Romo is a 73 y.o. female on day 8 of admission presenting with Altered mental status, unspecified altered mental status type.      Subjective   Patient was seen and admitted at bedside this morning, he was very conversational, alert, and when asked about eating, she was eager to eat her breakfast which she consumed entirely with my help and also that of PCNA.  Patient tolerated intake, no cough or difficulty swallowing noted       Objective     Last Recorded Vitals  /63 (BP Location: Left arm, Patient Position: Lying)   Pulse 81   Temp 36.6 °C (97.9 °F) (Temporal)   Resp 18   Wt 74 kg (163 lb 1.6 oz)   SpO2 97%   Intake/Output last 3 Shifts:  No intake or output data in the 24 hours ending 02/24/24 2004    Admission Weight  Weight: 74 kg (163 lb 1.6 oz) (02/13/24 1617)    Daily Weight  02/13/24 : 74 kg (163 lb 1.6 oz)    Image Results  EEG  IMPRESSION    This EEG is indicative of severe diffuse encephalopathy. No epileptiform abnormalities or lateralizing signs noted.    A full report will be scanned into the patient's chart at a later time.    This report has been interpreted and electronically signed by      Physical Exam  Constitutional: Elderly female, alert, conversational.  Not in acute distress  Eyes: PERRLA, clear sclera  ENMT: Moist mucosal membranes, no exudate  Head / Neck: Atraumatic, normocephalic, supple neck, JVP not visualized  Lungs: Patent airways, CTABL  Heart: RRR, S1S2, no murmurs appreciated, palpable pulses in all extremities  GI: Soft, NT, ND, bowel sounds present in all quadrants  MSK: Moderate muscle wasting, moves all extremities freely  Extremities: Moderate muscle wasting in the extremities, no peripheral edema  : No Jones catheter inserted  Breast: Deferred  Neurological: AAO x 0,  facial muscles symmetrical, withdraws to pain stimuli, muscle tone intact, resists limb mobilization  Psychological: Appropriate mood and behavior  Relevant Results            Scheduled medications  atorvastatin, 80 mg, oral, Nightly  enoxaparin, 40 mg, subcutaneous, q24h  lisinopril, 10 mg, oral, Daily  sennosides, 2 tablet, oral, BID      Continuous medications     PRN medications  PRN medications: acetaminophen **OR** acetaminophen **OR** acetaminophen, haloperidol lactate, hydrALAZINE, hydrALAZINE, melatonin      Assessment/Plan      73 y.o. female presenting with altered mental status and confusion.  This patient has a history of a remote TIA about 10 years ago, hypertension, hyperlipidemia, hyperglycemia, CKD and Charcot-Rylie-Tooth syndrome with foot drop.  History is provided by the patient but also by her sister.  Patient lives alone in her own home.  The sister states that 8 days ago the patient was not answering her phone which is unusual for her.          Principal Problem:    Altered mental status, unspecified altered mental status type  Active Problems:    Confusion    AMS (altered mental status)    Altered mental status  Alzheimer's dementia?  -Impressive change this morning, with patient very conversational, maintaining her mentation appropriately, and consume all her breakfast.  -Nutritionist consulted, recommend considering alternate route of nutrition.  Goal of care discussion  RDN recommendations:  Should pt require enteral nutrition: Jevity 1.5, goal rate 55ml/hr. (Start at 10 ml/hr and increase by 10ml/hr every 12 hrs unitl goal rate has been met. Formula at goal rate covers 100% of pts nutritional needs. Formulat at goal rate provides: 1980kcal, 84.2g protein and 1003ml h20. Flushes per MD rec or 150ml x 6)  Psychotic disorder: Continue mirtazapine 7.5 mg at bedtime, Haldol 1 mg IV every 8 hours as needed agitation  Catatonia  -MRI brain negative for acute process  -neurology consult: Appreciate management recommendations, reconsulted for persistent altered mental status  -EEG for further evaluation negative for seizures  -Psych consult: Upon updating on current  state, considering recommendation to SNF if continues to improve.  -pt off soft restraints     UTI  -urine cx negative  -ceftriaxone stopped     HTN  -continue lisinopril 10 mg daily     HLD  -continue statin, atorvastatin 80 mg daily     Diet  -Regular     DVT ppx  -Lovenox 40 mg subcu daily     Disposition: Presenting with catatonia, with altered mental status, now much improved after days of lack of alertness refusal of food intake.  Would likely be able to be discharged to SNF if continued to remain stable and continues to improve      Malnutrition Diagnosis Status: New  Malnutrition Diagnosis: Severe malnutrition related to acute disease or injury  As Evidenced by: prolonged poor intake of < 50% of estimated energy needs in > 5 days and mild to moderate subcutaneous fat loss present  I agree with the dietitian's malnutrition diagnosis.           Gil Mora, DO

## 2024-02-25 NOTE — SIGNIFICANT EVENT
Significantly improved per primary service  Eating, needs feeding some.   Conversational and spontaneous.   Will dc mirtazapine, make melatonin prn  No sundowning at this time  If pt is stable for the next 48 hrs can go directly to rehab  Not currently meeting geropsych inpt criteria

## 2024-02-26 LAB
ALBUMIN SERPL BCP-MCNC: 2.9 G/DL (ref 3.4–5)
ANION GAP SERPL CALC-SCNC: 14 MMOL/L (ref 10–20)
BUN SERPL-MCNC: 26 MG/DL (ref 6–23)
CALCIUM SERPL-MCNC: 8.8 MG/DL (ref 8.6–10.3)
CHLORIDE SERPL-SCNC: 108 MMOL/L (ref 98–107)
CO2 SERPL-SCNC: 24 MMOL/L (ref 21–32)
CREAT SERPL-MCNC: 1.22 MG/DL (ref 0.5–1.05)
EGFRCR SERPLBLD CKD-EPI 2021: 47 ML/MIN/1.73M*2
ERYTHROCYTE [DISTWIDTH] IN BLOOD BY AUTOMATED COUNT: 12.7 % (ref 11.5–14.5)
GLUCOSE SERPL-MCNC: 94 MG/DL (ref 74–99)
HCT VFR BLD AUTO: 38.1 % (ref 36–46)
HGB BLD-MCNC: 11.9 G/DL (ref 12–16)
MCH RBC QN AUTO: 30.2 PG (ref 26–34)
MCHC RBC AUTO-ENTMCNC: 31.2 G/DL (ref 32–36)
MCV RBC AUTO: 97 FL (ref 80–100)
NRBC BLD-RTO: 0 /100 WBCS (ref 0–0)
PHOSPHATE SERPL-MCNC: 3.1 MG/DL (ref 2.5–4.9)
PLATELET # BLD AUTO: 352 X10*3/UL (ref 150–450)
POTASSIUM SERPL-SCNC: 4.4 MMOL/L (ref 3.5–5.3)
RBC # BLD AUTO: 3.94 X10*6/UL (ref 4–5.2)
SODIUM SERPL-SCNC: 142 MMOL/L (ref 136–145)
WBC # BLD AUTO: 8.5 X10*3/UL (ref 4.4–11.3)

## 2024-02-26 PROCEDURE — 97530 THERAPEUTIC ACTIVITIES: CPT | Mod: GP,CQ

## 2024-02-26 PROCEDURE — 36415 COLL VENOUS BLD VENIPUNCTURE: CPT | Performed by: INTERNAL MEDICINE

## 2024-02-26 PROCEDURE — 85027 COMPLETE CBC AUTOMATED: CPT | Performed by: INTERNAL MEDICINE

## 2024-02-26 PROCEDURE — 97535 SELF CARE MNGMENT TRAINING: CPT | Mod: GO

## 2024-02-26 PROCEDURE — 80069 RENAL FUNCTION PANEL: CPT | Performed by: INTERNAL MEDICINE

## 2024-02-26 PROCEDURE — 2500000004 HC RX 250 GENERAL PHARMACY W/ HCPCS (ALT 636 FOR OP/ED): Performed by: PSYCHIATRY & NEUROLOGY

## 2024-02-26 PROCEDURE — 2500000001 HC RX 250 WO HCPCS SELF ADMINISTERED DRUGS (ALT 637 FOR MEDICARE OP): Performed by: PHYSICIAN ASSISTANT

## 2024-02-26 PROCEDURE — 99232 SBSQ HOSP IP/OBS MODERATE 35: CPT | Performed by: INTERNAL MEDICINE

## 2024-02-26 PROCEDURE — 96125 COGNITIVE TEST BY HC PRO: CPT | Mod: GO

## 2024-02-26 PROCEDURE — 2500000004 HC RX 250 GENERAL PHARMACY W/ HCPCS (ALT 636 FOR OP/ED): Performed by: PHYSICIAN ASSISTANT

## 2024-02-26 PROCEDURE — 1200000002 HC GENERAL ROOM WITH TELEMETRY DAILY

## 2024-02-26 RX ADMIN — ENOXAPARIN SODIUM 40 MG: 40 INJECTION SUBCUTANEOUS at 02:45

## 2024-02-26 RX ADMIN — LISINOPRIL 10 MG: 10 TABLET ORAL at 11:33

## 2024-02-26 RX ADMIN — STANDARDIZED SENNA CONCENTRATE 17.2 MG: 8.6 TABLET ORAL at 11:33

## 2024-02-26 RX ADMIN — STANDARDIZED SENNA CONCENTRATE 17.2 MG: 8.6 TABLET ORAL at 21:40

## 2024-02-26 RX ADMIN — ATORVASTATIN CALCIUM 80 MG: 80 TABLET, FILM COATED ORAL at 21:40

## 2024-02-26 RX ADMIN — DRONABINOL 2.5 MG: 2.5 CAPSULE ORAL at 17:31

## 2024-02-26 RX ADMIN — DRONABINOL 2.5 MG: 2.5 CAPSULE ORAL at 06:42

## 2024-02-26 ASSESSMENT — COGNITIVE AND FUNCTIONAL STATUS - GENERAL
EATING MEALS: A LOT
STANDING UP FROM CHAIR USING ARMS: A LOT
TOILETING: A LOT
TURNING FROM BACK TO SIDE WHILE IN FLAT BAD: TOTAL
DRESSING REGULAR UPPER BODY CLOTHING: A LOT
MOBILITY SCORE: 14
HELP NEEDED FOR BATHING: A LOT
WALKING IN HOSPITAL ROOM: A LOT
DAILY ACTIVITIY SCORE: 12
TOILETING: A LOT
PERSONAL GROOMING: A LOT
DAILY ACTIVITIY SCORE: 12
CLIMB 3 TO 5 STEPS WITH RAILING: TOTAL
DRESSING REGULAR LOWER BODY CLOTHING: A LOT
EATING MEALS: A LOT
CLIMB 3 TO 5 STEPS WITH RAILING: TOTAL
MOVING TO AND FROM BED TO CHAIR: TOTAL
DRESSING REGULAR UPPER BODY CLOTHING: A LOT
DRESSING REGULAR LOWER BODY CLOTHING: A LOT
HELP NEEDED FOR BATHING: A LOT
WALKING IN HOSPITAL ROOM: TOTAL
PERSONAL GROOMING: A LOT
STANDING UP FROM CHAIR USING ARMS: TOTAL
MOVING TO AND FROM BED TO CHAIR: A LOT
MOBILITY SCORE: 7
TURNING FROM BACK TO SIDE WHILE IN FLAT BAD: A LITTLE
MOVING FROM LYING ON BACK TO SITTING ON SIDE OF FLAT BED WITH BEDRAILS: A LOT

## 2024-02-26 ASSESSMENT — ACTIVITIES OF DAILY LIVING (ADL): HOME_MANAGEMENT_TIME_ENTRY: 10

## 2024-02-26 ASSESSMENT — PAIN - FUNCTIONAL ASSESSMENT
PAIN_FUNCTIONAL_ASSESSMENT: WONG-BAKER FACES
PAIN_FUNCTIONAL_ASSESSMENT: UNABLE TO SELF-REPORT
PAIN_FUNCTIONAL_ASSESSMENT: UNABLE TO SELF-REPORT
PAIN_FUNCTIONAL_ASSESSMENT: WONG-BAKER FACES

## 2024-02-26 ASSESSMENT — PAIN SCALES - WONG BAKER
WONGBAKER_NUMERICALRESPONSE: NO HURT
WONGBAKER_NUMERICALRESPONSE: NO HURT

## 2024-02-26 ASSESSMENT — PAIN SCALES - GENERAL
PAINLEVEL_OUTOF10: 0 - NO PAIN

## 2024-02-26 NOTE — PROGRESS NOTES
Heather Romo is a 73 y.o. female on day 9 of admission presenting with Altered mental status, unspecified altered mental status type.      Subjective   Was seen and examined bedside this morning was again not interested in eating after doing progress yesterday, declined to open her mouth to be fed, although she was alert and conversational.       Objective     Last Recorded Vitals  /66 (BP Location: Right arm, Patient Position: Lying)   Pulse 92   Temp 37 °C (98.6 °F) (Temporal)   Resp 18   Wt 74 kg (163 lb 1.6 oz)   SpO2 97%   Intake/Output last 3 Shifts:    Intake/Output Summary (Last 24 hours) at 2/25/2024 1946  Last data filed at 2/24/2024 2040  Gross per 24 hour   Intake 240 ml   Output --   Net 240 ml       Admission Weight  Weight: 74 kg (163 lb 1.6 oz) (02/13/24 1617)    Daily Weight  02/13/24 : 74 kg (163 lb 1.6 oz)    Image Results  EEG  IMPRESSION    This EEG is indicative of severe diffuse encephalopathy. No epileptiform abnormalities or lateralizing signs noted.    A full report will be scanned into the patient's chart at a later time.    This report has been interpreted and electronically signed by      Physical Exam  Constitutional: Elderly female, alert, conversational.  Not in acute distress  Eyes: PERRLA, clear sclera  ENMT: Moist mucosal membranes, no exudate  Head / Neck: Atraumatic, normocephalic, supple neck, JVP not visualized  Lungs: Patent airways, CTABL  Heart: RRR, S1S2, no murmurs appreciated, palpable pulses in all extremities  GI: Soft, NT, ND, bowel sounds present in all quadrants  MSK: Moderate muscle wasting, moves all extremities freely  Extremities: Moderate muscle wasting in the extremities, no peripheral edema  : No Jones catheter inserted  Breast: Deferred  Neurological: AAO x 0,  facial muscles symmetrical, withdraws to pain stimuli, muscle tone intact, resists limb mobilization  Psychological: Appropriate mood and behavior    Relevant Results              Scheduled medications  atorvastatin, 80 mg, oral, Nightly  enoxaparin, 40 mg, subcutaneous, q24h  lisinopril, 10 mg, oral, Daily  sennosides, 2 tablet, oral, BID      Continuous medications     PRN medications  PRN medications: acetaminophen **OR** acetaminophen **OR** acetaminophen, haloperidol lactate, hydrALAZINE, hydrALAZINE, melatonin    Assessment/Plan      73 y.o. female presenting with altered mental status and confusion.  This patient has a history of a remote TIA about 10 years ago, hypertension, hyperlipidemia, hyperglycemia, CKD and Charcot-Rylie-Tooth syndrome with foot drop.  History is provided by the patient but also by her sister.  Patient lives alone in her own home.  The sister states that 8 days ago the patient was not answering her phone which is unusual for her.         Principal Problem:    Altered mental status, unspecified altered mental status type  Active Problems:    Confusion    AMS (altered mental status)    Altered mental status  Alzheimer's dementia?  -Reviewed labs to her state of disinterest in oral intake, despite showing tremendous progress yesterday by ED and also breakfast  -Nutritionist consulted, recommend considering alternate route of nutrition.  Goal of care discussion  RDN recommendations:  Should pt require enteral nutrition: Jevity 1.5, goal rate 55ml/hr. (Start at 10 ml/hr and increase by 10ml/hr every 12 hrs unitl goal rate has been met. Formula at goal rate covers 100% of pts nutritional needs. Formulat at goal rate provides: 1980kcal, 84.2g protein and 1003ml h20. Flushes per MD rec or 150ml x 6)  Psychotic disorder: Continue mirtazapine 7.5 mg at bedtime, Haldol 1 mg IV every 8 hours as needed agitation  Catatonia  -MRI brain negative for acute process  -neurology consult: Appreciate management recommendations, reconsulted for persistent altered mental status  -EEG for further evaluation negative for seizures  -Psych consult: Upon updating on current state,  considering recommendation to SNF if continues to improve.  -pt off soft restraints     UTI  -urine cx negative  -ceftriaxone stopped     HTN  -continue lisinopril 10 mg daily     HLD  -continue statin, atorvastatin 80 mg daily     Diet  -Regular     DVT ppx  -Lovenox 40 mg subcu daily     Disposition: Presenting with catatonia, with altered mental status, will discuss with sister regarding goals of care as patient fluctuated between state of good oral intake and  of disinterest regarding food intake.    Malnutrition Diagnosis Status: New  Malnutrition Diagnosis: Severe malnutrition related to acute disease or injury  As Evidenced by: prolonged poor intake of < 50% of estimated energy needs in > 5 days and mild to moderate subcutaneous fat loss present  I agree with the dietitian's malnutrition diagnosis.       Gil Mora, DO

## 2024-02-26 NOTE — CARE PLAN
Problem: Pain - Adult  Goal: Verbalizes/displays adequate comfort level or baseline comfort level  Outcome: Progressing     Problem: Safety - Adult  Goal: Free from fall injury  Outcome: Progressing     Problem: Chronic Conditions and Co-morbidities  Goal: Patient's chronic conditions and co-morbidity symptoms are monitored and maintained or improved  Outcome: Progressing     Problem: Skin  Goal: Prevent/manage excess moisture  Flowsheets (Taken 2/26/2024 0035)  Prevent/manage excess moisture: Moisturize dry skin

## 2024-02-26 NOTE — PROGRESS NOTES
Occupational Therapy    OT Treatment    Patient Name: Heather Romo  MRN: [id number]  Today's Date: 2/26/2024  Time Calculation  Start Time: 1139  Stop Time: 1204  Time Calculation (min): 25 min         Assessment:  Prognosis: Fair  Barriers to Discharge: Decreased caregiver support  Medical Staff Made Aware: Yes  End of Session Communication: Care Coordinator  End of Session Patient Position: Bed, 3 rail up, Alarm on  OT Assessment Results: Decreased ADL status, Decreased cognition, Decreased endurance, Decreased safe judgment during ADL, Decreased functional mobility, Decreased gross motor control, Decreased trunk control for functional activities, Decreased upper extremity strength  Prognosis: Fair  Barriers to Discharge: Decreased caregiver support  Medical Staff Made Aware: Yes  Barriers to Participation: Ability to acquire knowledge  Plan:  Treatment Interventions: ADL retraining, Functional transfer training, Endurance training, Cognitive reorientation  OT Frequency: 3 times per week  OT Discharge Recommendations: Moderate intensity level of continued care  Equipment Recommended upon Discharge: Wheeled walker  OT Recommended Transfer Status: Maximum assist, Assist of 2  OT - OK to Discharge: Yes  Treatment Interventions: ADL retraining, Functional transfer training, Endurance training, Cognitive reorientation    Subjective   Previous Visit Info:  OT Last Visit  OT Received On: 02/26/24  General:  General  Reason for Referral: AMS, decline in ADLs  Referred By: Katy  Past Medical History Relevant to Rehab:   Past Medical History:   Diagnosis Date    Personal history of transient ischemic attack (TIA), 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 cleared for OT session. Pt. received supine in bed. Pt. edu on OT role and POC. Pt. encouraged participation in OT and cog. assessment. Pt.  delayed responses. Pt. kept closing eyes and declined to answer/participate in OT session.)  Family/Caregiver Present: No  Co-Treatment: PT  Co-Treatment Reason: partial co tx, to increase patient safety, transfer ability, and participation.  Prior to Session Communication: Bedside nurse  Patient Position Received: Bed, 3 rail up, Alarm on  Preferred Learning Style: verbal, kinesthetic  General Comment: Pt. received sitting EOB with PTA brief soild.  Precautions:  Medical Precautions: Fall precautions  Vital Signs:     Pain:  Pain Assessment  Pain Assessment: Unable to self-report  Pain Score: 0 - No pain    Objective    Cognition:  Cognition  Overall Cognitive Status: Impaired  Orientation Level: Disoriented to place, Disoriented to time, Disoriented to situation  Following Commands: Follows one step commands with increased time  Safety Judgment: Decreased awareness of need for assistance  Problem Solving: Unable to assess  Attention: Exceptions to WFL  Alternating Attention: Impaired  Divided Attention: Impaired  Sustained Attention: Impaired  Memory: Exceptions to WFL  Problem Solving: Unable to assess  Numeric Reasoning: Unable to assess  Abstract Reasoning: Unable to assess  Safety/Judgement: Unable to assess  Insight: Severe  Impulsive: Mildly  Organization: Severely disorganized  Processing Speed: Delayed  Coordination:  Trunk Coordination: poor sitting balance with strong left lateral lean  Finger to Nose: Impaired  Activities of Daily Living: LE Dressing  LE Dressing: Yes  Sock Level of Assistance: Moderate assistance  LE Dressing Where Assessed: Edge of bed  LE Dressing Comments: Pt. attempted to don socks, pt. unable to thread sock on feet. This OT assisted donning socks.  Functional Standing Tolerance:     Bed Mobility/Transfers: Bed Mobility  Bed Mobility: Yes  Bed Mobility 1  Bed Mobility 1: Scooting, Sitting to supine  Level of Assistance 1: Maximum assistance, Close supervision  Bed Mobility Comments  1: MaxA for trunk elevation and for BLE off EOB. Pt able to initiate BLE towards EOB with max one step commands. Close supervision to scoot hips towards EOB.     Neuro Evaluation    Patient Name: Heather Romo  MRN: [id number]  Today's Date: 02/26/24  Time Calculation  Start Time: 1139  Stop Time: 1204  Time Calculation (min): 25 min         Assessment:   PT Assessment Results: Decreased strength, Decreased range of motion, Decreased endurance, Impaired balance, Decreased mobility, Orthopedic restrictions  Rehab Prognosis: Good  Barriers to Participation: Ability to acquire knowledge  Assessment Comment: PT Evaluation Completed. The patient presented with decreased mobility, balance, endurance, safety awareness, and increased  fatigue. These impairments are negatively impacting her ability to perform at baseline functional level, in home environment. The patient is at risk of falls & injury. This patient would benefit from skilled therapy intervention to address limitations and progress towards the PT goals.  Anticipate moderate frequency PT needs at discharge    Plan:       Current Problem:   1. Altered mental status, unspecified altered mental status type  EEG          Subjective   General Visit Information:     HPI:   Precautions:  Precautions  Medical Precautions: Fall precautions  Vital Signs:     Pain:  Pain Assessment  Pain Assessment: Unable to self-report  Pain Score: 0 - No pain  Home Living:  Type of Home: House  Lives With: Alone  Home Adaptive Equipment: None  Home Layout: Two level, Bed/bath upstairs, Stairs to alternate level with rails  Alternate Level Stairs-Rails: Both  Alternate Level Stairs-Number of Steps: 13  Home Access: Stairs to enter without rails  Entrance Stairs-Number of Steps: 2  Bathroom Shower/Tub: Tub/shower unit (pt sister states bathroom is in dissarray)  Bathroom Equipment: None  Prior Level of Function:  Level of [place]: Independent with ADLs and functional transfers  ADL  Assistance: Independent  Homemaking Assistance:  (microwaves meals, does not clean regularly, completes laundry at a laundromat)  Ambulatory Assistance: Independent (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 1 recent fall. Descends the stairs backwards using surjit HRs.    Objective   Cognition:  Overall Cognitive Status: Impaired  Arousal/Alertness: Delayed responses to stimuli  Orientation Level: Disoriented to place, Disoriented to time, Disoriented to situation  Following Commands: Follows one step commands with increased time  Safety Judgment: Decreased awareness of need for assistance  Problem Solving: Unable to assess  Cognition Comments: Cognitive assessment ordered for pt (MoCA); pt unable to participate in assessment.  Pt is not oriented, unable to follow commands, decreased awareness of her surroundings  Attention: Exceptions to WFL  Alternating Attention: Impaired  Divided Attention: Impaired  Sustained Attention: Impaired  Memory: Exceptions to WFL  Short-Term Memory:  (poor ability to provide accurate PLOF, pt sister present and providing information)  Problem Solving: Unable to assess  Numeric Reasoning: Unable to assess  Abstract Reasoning: Unable to assess  Safety/Judgement: Unable to assess  Insight: Severe  Impulsive: Mildly  Flexibility of Thought: Reduced flexibility  Planning: Reduced planning skills  Organization: Severely disorganized  Processing Speed: Delayed  Strength:  Strength Comments: poor trunk strength/control, generalized weakness    Coordination:  Movements are Fluid and Coordinated: No  Trunk Coordination: poor sitting balance with strong left lateral lean  Finger to Nose: Impaired    Postural Control:  Postural Control: Within Functional Limits  Trunk Control: over all required Max A to maintain sitting EOB due to R lean/push.  Outcome Measures:  [facility] Daily Activity  Putting on and taking off regular lower body clothing: A lot  Bathing  (including washing, rinsing, drying): A lot  Putting on and taking off regular upper body clothing: A lot  Toileting, which includes using toilet, bedpan or urinal: A lot  Taking care of personal grooming such as brushing teeth: A lot  Eating Meals: A lot  Daily Activity - Total Score: 12                Education Documentation  ADL Training, taught by Heather Tavares OT at 2/26/2024  1:44 PM.  Learner: Patient  Readiness: Acceptance  Method: Demonstration, Explanation  Response: No Evidence of Learning    Education Comments  No comments found.      OP EDUCATION:  Outpatient Education  Individual(s) Educated: Patient    Goals:  Encounter Problems       Encounter Problems (Active)       ADLs       Patient will perform UB and LB sponge bathing  with setup assist level of assistance. (Not Progressing)       Start:  02/14/24    Expected End:  03/07/24            Patient will complete daily grooming tasks with modified independent level of assistance and PRN adaptive equipment while standing. (Not Progressing)       Start:  02/14/24    Expected End:  03/07/24            Patient will complete toileting including hygiene clothing management/hygiene with modified independent level of assistance and raised toilet seat and grab bars. (Not Progressing)       Start:  02/14/24    Expected End:  03/07/24               Balance       complete all mobility with normal balance while dual tasking, negotiating in a dynamic environment, carrying items, etc., with proactive and reactive static and dynamic standing and sitting tasks, with mod I and a RW, >15 minutes. (Not met)       Start:  02/14/24    Expected End:  02/21/24    Resolved:  02/23/24    Updated to: Pt will tolerate 10+ mins dynamic sitting balance activities with min A or less.    Update reason: Goal no longer appropriate.          Pt will tolerate 10+ mins dynamic sitting balance activities with min A or less. (Progressing)       Start:  02/23/24    Expected End:  03/06/24                    COGNITION/SAFETY       Patient will follow 100% Two step commands to allow improved ADL performance. (Not Progressing)       Start:  02/14/24    Expected End:  03/07/24            Patient will demonstrated orientation x 3 with 1 verbal cue. (Not Progressing)       Start:  02/14/24    Expected End:  03/07/24       ORIENTATION            MOBILITY       Patient will perform Functional mobility  Household distances/Community Distances with  mod independence level of assistance and least restrictive device in order to improve safety and functional mobility. (Not Progressing)       Start:  02/14/24    Expected End:  03/07/24               Mobility       STG - Patient will ambulate 150 ft mod I using LRAD.  (Not met)       Start:  02/14/24    Expected End:  02/21/24    Resolved:  02/23/24    Updated to: STG - Patient will ambulate 15 ft with mod A using RW.    Update reason: Goal no longer appropriate.          STG - Patient will ascend and descend a flight of stairs using surjit Hrs mod I.  (Not Progressing)       Start:  02/14/24    Expected End:  02/21/24            STG - Patient will ambulate 15 ft with mod A using RW. (Not Progressing)       Start:  02/23/24    Expected End:  03/06/24                   Transfers       STG - Patient will perform bed mobility independently.  (Not met)       Start:  02/14/24    Expected End:  02/21/24    Resolved:  02/23/24    Updated to: STG - Patient will perform bed mobility iwith mod A or less.    Update reason: Goal no longer appropriate.          STG - Patient will transfer sit to and from stand mod I with LRAD. (Not met)       Start:  02/14/24    Expected End:  02/21/24    Resolved:  02/23/24    Updated to: Pt will transfer sit to and from stand with mod A using a RW.    Update reason: Goal no longer appropriate.          STG - Patient will perform bed mobility iwith mod A or less. (Not Progressing)       Start:  02/23/24    Expected End:  03/06/24                Pt  will transfer sit to and from stand with mod A using a RW. (Not Progressing)       Start:  24    Expected End:  24                         Outcome Measures:[facility] Daily Activity  Putting on and taking off regular lower body clothing: A lot  Bathing (including washing, rinsing, drying): A lot  Putting on and taking off regular upper body clothing: A lot  Toileting, which includes using toilet, bedpan or urinal: A lot  Taking care of personal grooming such as brushing teeth: A lot  Eating Meals: A lot  Daily Activity - Total Score: 12         and MoCA  Visuospatial/Executive: 0 (Poor attention noted)  Namin  Memory (Score '0' as this is an Unscored Section): 0  Attention: Read List of Digits: 0 (Pt. verbalized 2854 and did not repeat in backward order pt. repeated 5025)  Attention: Read List of Letters: 0 (Pt. appeared preoccuspied and unable to attend to task, redirection provided)  Attention: Serial Sevens: 0 (Pt. verbalized 6 as only answer)  Language: Repeat: 0 (delayed response)  Language: Fluency: 0  Abstraction: 1  Delayed Recall: 0  Orientation: 0  Add 1 Point if </=12 yr Education: 1 (Pt.verbalized date as 3; city as [place] and did not provide any answer for month, year, day or place. Pt. graduated highschool. Pt. demosntrated difficulty attending to task. Redirection required.)  MOCA Total Score: 4    Education Documentation  ADL Training, taught by Heather Tavares OT at 2024  1:44 PM.  Learner: Patient  Readiness: Acceptance  Method: Demonstration, Explanation  Response: No Evidence of Learning    Education Comments  No comments found.        OP EDUCATION:  Education  Individual(s) Educated: Patient  Education Provided: Fall precautons, Risk and benefits of OT discussed with patient or other, POC discussed and agreed upon  Risk and Benefits Discussed with Patient/Caregiver/Other: yes    Goals:  Encounter Problems       Encounter Problems (Active)       ADLs       Patient will perform UB  and LB sponge bathing  with setup assist level of assistance. (Not Progressing)       Start:  02/14/24    Expected End:  03/07/24            Patient will complete daily grooming tasks with modified independent level of assistance and PRN adaptive equipment while standing. (Not Progressing)       Start:  02/14/24    Expected End:  03/07/24            Patient will complete toileting including hygiene clothing management/hygiene with modified independent level of assistance and raised toilet seat and grab bars. (Not Progressing)       Start:  02/14/24    Expected End:  03/07/24                     COGNITION/SAFETY       Patient will follow 100% Two step commands to allow improved ADL performance. (Not Progressing)       Start:  02/14/24    Expected End:  03/07/24            Patient will demonstrated orientation x 3 with 1 verbal cue. (Not Progressing)       Start:  02/14/24    Expected End:  03/07/24       ORIENTATION            MOBILITY       Patient will perform Functional mobility  Household distances/Community Distances with  mod independence level of assistance and least restrictive device in order to improve safety and functional mobility. (Not Progressing)       Start:  02/14/24    Expected End:  03/07/24

## 2024-02-26 NOTE — PROGRESS NOTES
02/26/24 1209   Discharge Planning   Patient expects to be discharged to: [facility] updated therapy notes to request auth-snf placement-facility updated   Does the patient need discharge transport arranged? Yes   [employer]rip coordination needed? Yes   Has discharge transport been arranged? No

## 2024-02-26 NOTE — PROGRESS NOTES
Occupational Therapy                 Therapy Communication Note    Patient Name: Heather Romo  MRN: [id number]  Today's Date: 2/26/2024     Discipline: Occupational Therapy    Missed Visit Reason: Missed Visit Reason: Other (Comment) (Pt. cleared for OT session. Pt. received supine in bed. Pt. edu on OT role and POC. Pt. encouraged participation in OT and cog. assessment. Pt. delayed responses. Pt. kept closing eyes and declined to answer/participate in OT session.)     Missed Time: Attempt    Comment: [facility] made aware of pt.decline in participation at this time. OT to defer session.

## 2024-02-26 NOTE — SIGNIFICANT EVENT
Mirtazapine for appetite stopped which helped with wakefulness but appetite plummeted.   Try dronabinol 2.5 mg twice daily am and pm - hold for sedation   If too somnolent reduce to once daily.

## 2024-02-26 NOTE — PROGRESS NOTES
02/26/24 1555   Discharge Planning   Patient expects to be discharged to: [facility]     Requested auth to [facility]   Chief complaint:   Chief Complaint   Patient presents with   • Supply     face to face for wheelchair    • Imm/Inj     Hep B       Vitals:  Visit Vitals  /80 Comment: manual blood pressure   Pulse 87   Temp 97.7 °F (36.5 °C) (Temporal)   Resp 18   Ht 5' 11\" (1.803 m)   Wt 116.6 kg   SpO2 97%   BMI 35.84 kg/m²       HISTORY OF PRESENT ILLNESS     Patient presents for face-to-face for power wheelchair, and follow-up DM and HTN.      Laryngeal spasm: Complaining of worsening voice hoarseness and cough, sees ENT.  Had botox injection 2 month ago but did not help.  She would like a re-evaluation for the chronic cough.    DM2: On Metformin  mg daily.  Last A1C 5.9 on 4/17/20.  A1C today 6.2.     HTN: On losaratan- HCTZ 100-25 daily.  BP normal today.    HLD: On statin, due for recheck lipid panel in 6 weeks.    Hypothyroidism: On Synthroid    Chronic pain: On Lyrica and Norco prescribed by pain management.  Intrathecal pain pump.    Anxiety: On duloxetine and chronic clonazepam    Mobility impairment: Neuropathy, difficulty with balance.  Uses a walker in the house but starts coughing on exertion.  Currently renting a power wheelchair for going outside.  Needs direct supervision with  in house with walker to prevent falls, but can transfer self using power wheelchair unassisted. Unable to use cane.          Other significant problems:  Patient Active Problem List    Diagnosis Date Noted   • Mobility impaired      Priority: Low   • Mixed rhinitis 09/19/2019     Priority: Low   • Hoarseness of voice 09/19/2019     Priority: Low   • Chronic cough 09/19/2019     Priority: Low   • Mild intermittent asthma without complication 08/07/2018     Priority: Low     Triggers include excessive talking, cold air     • Chronic pain syndrome 12/14/2016     Priority: Low   • Left bundle branch block (LBBB) on electrocardiogram 08/19/2016     Priority: Low     August 10, 2016 EKG shows left bundle branch block with a QRS  duration 148 ms.     • Eustachian tube dysfunction 03/02/2016     Priority: Low   • Arachnoiditis 10/01/2015     Priority: Low   • Type 2 diabetes mellitus with diabetic neuropathy (CMS/[facility]) 09/16/2015     Priority: Low   • Bilateral foot pain 09/16/2015     Priority: Low   • Sleep disorder 07/01/2015     Priority: Low   • Pain management contract signed 06/17/2015     Priority: Low     Opioid management contract signed with Dr. Peña.       • Dyslipidemia 01/21/2015     Priority: Low   • Bilateral knee pain 01/14/2015     Priority: Low   • Chronic use of opiate drugs therapeutic purposes 01/14/2015     Priority: Low   • Patellofemoral instability with pain 11/27/2013     Priority: Low   • Chronic low back pain 11/13/2013     Priority: Low   • Anxiety 07/16/2013     Priority: Low   • Depression 02/14/2013     Priority: Low   • Meralgia paresthetica of right side 12/18/2012     Priority: Low   • smoker 10/30/2012     Priority: Low   • COPD  10/30/2012     Priority: Low   • DM (diabetes mellitus), type 2 (CMS/[facility]) 10/30/2012     Priority: Low   • GERD  10/30/2012     Priority: Low   • Essential hypertension 10/30/2012     Priority: Low   • Osteoporosis 10/30/2012     Priority: Low     Bone density December 2009 lumbar spine T score -2.1, hips were normal     • Postlaminectomy syndrome, lumbar region 09/25/2012     Priority: Low   • Thoracic or lumbosacral neuritis or radiculitis, unspecified 09/25/2012     Priority: Low   • Obesity, unspecified 09/25/2012     Priority: Low   • Hypothyroidism 02/04/2010     Priority: Low       PAST MEDICAL, FAMILY AND SOCIAL HISTORY     Medications:  Current Outpatient Medications   Medication   • zolpidem (AMBIEN) 10 MG tablet   • levothyroxine 150 MCG tablet   • losartan-hydrochlorothiazide (HYZAAR) 50-12.5 MG per tablet   • Lyrica 100 MG capsule   • omeprazole (PRILOSEC) 40 MG capsule   • atorvastatin (LIPITOR) 40 MG tablet   • metFORMIN (GLUCOPHAGE-XR) 500 MG 24 hr tablet   •  amLODIPine (NORVASC) 5 MG tablet   • clonazePAM (KLONOPIN) 0.5 MG tablet   • naLOXone (NARCAN) 4 MG/0.1ML nasal spray   • DULoxetine (CYMBALTA) 60 MG capsule   • albuterol (VENTOLIN HFA) 108 (90 Base) MCG/ACT inhaler   • benzonatate (TESSALON PERLES) 200 MG capsule   • hydrOXYzine (ATARAX) 25 MG tablet   • albuterol-ipratropium 2.5 mg/0.5 mg (DUONEB) 0.5-2.5 (3) MG/3ML nebulizer solution   • UNABLE TO FIND   • diphenhydrAMINE (BENADRYL) 50 MG capsule   • [START ON 9/19/2020] HYDROcodone-acetaminophen (Norco) 5-325 MG per tablet   • [START ON 10/8/2020] tiZANidine (ZANAFLEX) 4 MG tablet   • fluticasone (FLONASE) 50 MCG/ACT nasal spray   • buPROPion (WELLBUTRIN XL) 300 MG 24 hr tablet   • umeclidinium-vilanterol (ANORO ELLIPTA) 62.5-25 MCG/INH inhaler   • ipratropium (ATROVENT) 0.03 % nasal spray   • mupirocin (BACTROBAN) 2 % ointment   • albuterol (VENTOLIN) (2.5 MG/3ML) 0.083% nebulizer solution     Current Facility-Administered Medications   Medication   • methylPREDNISolone DEPOT (DEPO-MEDROL) 80 MG/ML injection 80 mg   • methylPREDNISolone DEPOT (DEPO-MEDROL) 80 MG/ML injection 80 mg       Allergies:  ALLERGIES:   Allergen Reactions   • Neomycin Sulfate RASH   • Vancomycin      blisters   • Vancomycin Hcl ANAPHYLAXIS     Significant reaction to medication prior to surgery   • Clindamycin      blisters   • Corticosteroids      myopathy   • Penicillin V RASH   • Tetracycline DIARRHEA   • Amitriptyline Other (See Comments)     Caused Dry eyes.   • Daptomycin      Blisters     • Lidocaine RASH   • Mobic GI UPSET   • Penicillins HIVES and SEIZURES   • Polysporin [Bacitracin-Polymyxin B] RASH   • Xylocaine [Lidocaine Hcl (Local Anesth.)] RASH     TOPICAL       Past Medical  History/Surgeries:  Past Medical History:   Diagnosis Date   • Acute respiratory failure with hypoxia and hypercapnia (CMS/HCC) 8/7/2018    Overview:  Almost intubated in ER, h/o prior intubation now on BIPAP for NIPPV   • Allergy    • Anesthesia      combative after   • Anxiety    • Arachnoiditis    • Arthritis    • Chronic pain    • COPD (chronic obstructive pulmonary disease) (CMS/HCC)    • Depression    • Diabetes mellitus (CMS/HCC)    • Dyslipidemia    • Emphysema of lung (CMS/HCC)    • Essential (primary) hypertension    • Extruding suture 8/2014    back   • Fatty liver    • Fracture    • Gastroesophageal reflux disease    • High cholesterol    • Left bundle branch block (LBBB) on electrocardiogram 8/19/2016    August 10, 2016 EKG shows left bundle branch block with a QRS duration 148 ms.   • Migraines    • Mobility impaired walker   • Neuromuscular disorder (CMS/HCC)    • Osteopenia    • Osteoporosis    • Pain management contract signed 6/17/2015    Opioid management contract signed with Dr. Peña.    • Pain medication agreement signed 01/24/2019    Dr Peña   • Piercing     nose   • Pneumonia     past history   • RAD (reactive airway disease)    • Thyroid condition    • Tubular adenoma    • Urinary tract infection    • Wears glasses     wears contacts       Past Surgical History:   Procedure Laterality Date   • Abdomen surgery     • Appendectomy     • Back surgery      lumbar x several about 40 per patient   • Gynecologic cryosurgery      tubal   • Hysterectomy      with bl oophorectomy   • Knee surgery  12/2013    ligament surgery left   • Skin biopsy     • Spinal cord stimulator removal  7/2014    multiple, all removed   • Spine surgery     • Tubal ligation         Family History:  Family History   Problem Relation Age of Onset   • Arthritis Mother    • Hypertension Mother    • Heart disease Mother    • Vision Loss Mother    • Depression Mother    • High blood pressure Mother    • High cholesterol Mother    • Psychiatric Mother    • Heart disease Father         MI   • Hypertension Brother    • Depression Sister    • High blood pressure Sister        Social History:  Social History     Tobacco Use   • Smoking status: Current Every Day Smoker      Packs/day: 2.00     Years: 30.00     Pack years: 60.00     Types: Cigarettes   • Smokeless tobacco: Never Used   • Tobacco comment: Patient is not ready to quit at this time.   Substance Use Topics   • Alcohol use: No     Alcohol/week: 0.0 standard drinks       REVIEW OF SYSTEMS     Review of Systems   Constitutional: Positive for unexpected weight change. Negative for chills and fever.   HENT: Positive for voice change.    Respiratory: Positive for cough. Negative for shortness of breath.    Cardiovascular: Negative for chest pain.   Gastrointestinal: Positive for constipation. Negative for diarrhea.   Genitourinary: Negative for decreased urine volume and difficulty urinating.   Musculoskeletal: Positive for back pain and gait problem.   Skin: Negative for rash and wound.   Neurological: Positive for dizziness and light-headedness.        When coughing   Psychiatric/Behavioral: Positive for sleep disturbance. The patient is nervous/anxious.        PHYSICAL EXAM     Physical Exam  Constitutional:       Appearance: She is obese.      Comments: Coughing fits through entire visit.  Voice hoarse.  Difficulty hearing right side.   HENT:      Head: Normocephalic and atraumatic.   Eyes:      General: No scleral icterus.     Conjunctiva/sclera: Conjunctivae normal.   Cardiovascular:      Rate and Rhythm: Normal rate and regular rhythm.      Pulses: Normal pulses.      Heart sounds: Normal heart sounds.   Pulmonary:      Effort: Pulmonary effort is normal. No respiratory distress.      Breath sounds: Stridor present. No wheezing.      Comments: Inspiratory stridor occasional.  Abdominal:      General: Bowel sounds are normal. There is no distension.      Palpations: Abdomen is soft.      Tenderness: There is no abdominal tenderness. There is no guarding.   Musculoskeletal: Normal range of motion.         General: No swelling or tenderness.      Right lower leg: No edema.      Left lower leg: No edema.   Skin:     General:  Skin is warm and dry.      Coloration: Skin is not jaundiced.   Neurological:      Mental Status: She is alert.      GCS: GCS eye subscore is 4. GCS verbal subscore is 5. GCS motor subscore is 6.      Cranial Nerves: Cranial nerves are intact. No cranial nerve deficit or facial asymmetry.      Sensory: Sensory deficit present.      Motor: Motor function is intact.      Coordination: Coordination abnormal.      Gait: Gait abnormal.      Comments: Absent sensation to light touch bilateral lower extremities to knees.  Full strength in flexion and extension upper and lower extremities.  Slowed rapid alternating movements upper extremities.  On standing, patient leans heavily on examiner and is wobbling back and forth with rapidly shifting center of gravity.  Did not feel safe to perform Romberg.  Normal finger-nose testing.         ASSESSMENT/PLAN     Type 2 diabetes mellitus with diabetic neuropathy, without long-term current use of insulin (CMS/[facility])  - POCT GLYCOHEMOGLOBIN HAND HELD DEVICE    Essential hypertension    Dyslipidemia    Mild intermittent asthma without complication    Gastroesophageal reflux disease without esophagitis    Hypothyroidism, unspecified type  - THYROID STIMULATING HORMONE REFLEX; Future    Chronic bilateral low back pain with bilateral sciatica    Mobility impaired  - SERVICE TO PHYSICAL THERAPY    Need for vaccination  - HEP B VACC ADULT 3 DOSE, IM      Pt presents to transfer care to new resident, for mobility face-to-face, and for follow-up above chronic conditions.    Mobility assessment: Patient would benefit from power wheelchair in home.  She has severely impaired balance even with maximal assistance, and I am not comfortable with her continuing to use a walker for mobility. Power wheelchair would give her independence with her mobility.  Copy of this note to be sent to Fax this note to [facility] [phone], fax: [phone]  They will coordinate with PT for eval.  PT referral  sent.    DM2 well-controlled, continue current management. Repeat A1C in 6 months.  Needs eye exam.  HTN well controlled, continue current management.  Dyslipidemia on statin, check lipids in 6 weeks.  Hypothyroidism on synthroid, check TSH.  Chronic pain, continue to follow with pain management.    Asthma, COPD, chronic cough, laryngeal spasm.  Return in 6 weeks to discuss these in more detail.      Discussed with Dr. Quinn, attending physician, who agrees with the plan.    Dionne Klein MD, PGY3 9/8/2020 4:19 PM

## 2024-02-26 NOTE — PROGRESS NOTES
Physical Therapy    Physical Therapy Treatment    Patient Name: Heather Romo  MRN: [id number]  Today's Date: 2/26/2024  Time Calculation  Start Time: 1135  Stop Time: 1146  Time Calculation (min): 11 min       Assessment/Plan   PT Assessment  End of Session Communication: Bedside nurse  End of Session Patient Position: Bed, 3 rail up, Alarm on  PT Plan  Inpatient/Swing Bed or Outpatient: Inpatient  PT Plan  Treatment/Interventions: Bed mobility, Transfer training  PT Plan: Skilled PT  PT Frequency: 3 times per week  PT Discharge Recommendations: Moderate intensity level of continued care  Equipment Recommended upon Discharge: Wheeled walker  PT Recommended Transfer Status: Assist x2  PT - OK to Discharge: Yes (per POC)      General Visit Information:   PT  Visit  PT Received On: 02/26/24  General  Reason for Referral: AMS, decline in ADLs  Referred By: Katy  Past Medical History Relevant to Rehab: see medical chart  Co-Treatment: OT  Co-Treatment Reason: partial co tx, to increase patient safety, transfer ability, and participation.  Prior to Session Communication: Bedside nurse  Patient Position Received: Bed, 3 rail up, Alarm on  General Comment: One step commands required with poor carry over noted.    Subjective   Precautions:  Precautions  Medical Precautions: Fall precautions  Vital Signs:       Objective   Pain:  Pain Assessment  Pain Assessment: Unable to self-report  Cognition:  Cognition  Overall Cognitive Status: Impaired  Orientation Level: Disoriented to place, Disoriented to time, Disoriented to situation  Postural Control:  Static Sitting Balance  Static Sitting-Balance Support: Feet supported  Static Sitting-Level of Assistance: Close supervision, Contact guard  Static Sitting-Comment/Number of Minutes: Intermittent close supervision and CGA due to impulsive behavior. Pt able to sit for ~3 minutes  Dynamic Sitting Balance  Dynamic Sitting-Balance Support: Feet supported  Dynamic Sitting-Balance:  Lateral lean, Forward lean  Dynamic Sitting-Comments: Jesse due to LOB laterally, pt unable to correct to midline  Static Standing Balance  Static Standing-Balance Support: Bilateral upper extremity supported  Static Standing-Level of Assistance: Maximum assistance  Static Standing-Comment/Number of Minutes: Forward flexed posture, unable to bring hips into extension. Soft knees and R lateral lean. Max A x 2 to maintain upright posture  Extremity/Trunk Assessments:    Activity Tolerance:     Treatments:       Bed Mobility  Bed Mobility: Yes  Bed Mobility 1  Bed Mobility 1: Supine to sitting, Scooting, Sitting to supine  Level of Assistance 1: Maximum assistance, Close supervision  Bed Mobility Comments 1: HOB elevated. Pt required max VC/TC for sequencing and hand placement. MaxA for trunk elevation and for BLE off EOB. Pt able to initiate BLE towards EOB with max one step commands. Close supervision to scoot hips towards EOB. (Re-entered to assist OT to return back to bed. MaxA x 2 sit>sit and to scoot towards HOB.)    Ambulation/Gait Training  Ambulation/Gait Training Performed: No (pt unable to maintain erect posture to perform)  Transfers  Transfer: Yes  Transfer 1  Technique 1: Sit to stand, Stand to sit  Transfer Device 1: Walker, Gait belt  Transfer Level of Assistance 1: Maximum assistance, +2  Trials/Comments 1: x2 trials. Max VC/TC for sequencing and hand placement. MaxA x 2 for trunk elevation sit>stand and eccentric control stand>sit. Forward flexed posture, soft knees, R lateral lean, and flexed hips noted. Pt unable maintain upright posture independently    Outcome Measures:  [facility] Basic Mobility  Turning from your back to your side while in a flat bed without using bedrails: A lot  Moving from lying on your back to sitting on the side of a flat bed without using bedrails: Total  Moving to and from bed to chair (including a wheelchair): Total  Standing up from a chair using your arms (e.g. wheelchair or  bedside chair): Total  To walk in hospital room: Total  Climbing 3-5 steps with railing: Total  Basic Mobility - Total Score: 7    Education Documentation  Body Mechanics, taught by Holli Madrid PTA at 2/26/2024 12:08 PM.  Learner: Patient  Readiness: Nonacceptance  Method: Explanation  Response: Needs Reinforcement    Mobility Training, taught by Holli Madrid PTA at 2/26/2024 12:08 PM.  Learner: Patient  Readiness: Nonacceptance  Method: Explanation  Response: Needs Reinforcement    Education Comments  No comments found.        OP EDUCATION:  Outpatient Education  Individual(s) Educated: Patient    Encounter Problems       Encounter Problems (Active)       Balance       complete all mobility with normal balance while dual tasking, negotiating in a dynamic environment, carrying items, etc., with proactive and reactive static and dynamic standing and sitting tasks, with mod I and a RW, >15 minutes. (Not met)       Start:  02/14/24    Expected End:  02/21/24    Resolved:  02/23/24    Updated to: Pt will tolerate 10+ mins dynamic sitting balance activities with min A or less.    Update reason: Goal no longer appropriate.          Pt will tolerate 10+ mins dynamic sitting balance activities with min A or less. (Progressing)       Start:  02/23/24    Expected End:  03/06/24                   Mobility       STG - Patient will ambulate 150 ft mod I using LRAD.  (Not met)       Start:  02/14/24    Expected End:  02/21/24    Resolved:  02/23/24    Updated to: STG - Patient will ambulate 15 ft with mod A using RW.    Update reason: Goal no longer appropriate.          STG - Patient will ascend and descend a flight of stairs using surjit Hrs mod I.  (Not Progressing)       Start:  02/14/24    Expected End:  02/21/24            STG - Patient will ambulate 15 ft with mod A using RW. (Not Progressing)       Start:  02/23/24    Expected End:  03/06/24                   Pain - Adult          Transfers       STG - Patient will  perform bed mobility independently.  (Not met)       Start:  02/14/24    Expected End:  02/21/24    Resolved:  02/23/24    Updated to: STG - Patient will perform bed mobility iwith mod A or less.    Update reason: Goal no longer appropriate.          STG - Patient will transfer sit to and from stand mod I with LRAD. (Not met)       Start:  02/14/24    Expected End:  02/21/24    Resolved:  02/23/24    Updated to: Pt will transfer sit to and from stand with mod A using a RW.    Update reason: Goal no longer appropriate.          STG - Patient will perform bed mobility iwith mod A or less. (Not Progressing)       Start:  02/23/24    Expected End:  03/06/24                Pt will transfer sit to and from stand with mod A using a RW. (Not Progressing)       Start:  02/23/24    Expected End:  03/06/24

## 2024-02-27 VITALS
OXYGEN SATURATION: 94 % | RESPIRATION RATE: 16 BRPM | HEART RATE: 70 BPM | HEIGHT: 63 IN | DIASTOLIC BLOOD PRESSURE: 48 MMHG | BODY MASS INDEX: 28.9 KG/M2 | TEMPERATURE: 97.4 F | WEIGHT: 163.1 LBS | SYSTOLIC BLOOD PRESSURE: 133 MMHG

## 2024-02-27 LAB
ALBUMIN SERPL BCP-MCNC: 2.7 G/DL (ref 3.4–5)
ANION GAP SERPL CALC-SCNC: 13 MMOL/L (ref 10–20)
BUN SERPL-MCNC: 31 MG/DL (ref 6–23)
CALCIUM SERPL-MCNC: 8.4 MG/DL (ref 8.6–10.3)
CHLORIDE SERPL-SCNC: 110 MMOL/L (ref 98–107)
CO2 SERPL-SCNC: 21 MMOL/L (ref 21–32)
CREAT SERPL-MCNC: 1.2 MG/DL (ref 0.5–1.05)
EGFRCR SERPLBLD CKD-EPI 2021: 48 ML/MIN/1.73M*2
GLUCOSE BLD MANUAL STRIP-MCNC: 117 MG/DL (ref 74–99)
GLUCOSE SERPL-MCNC: 98 MG/DL (ref 74–99)
PHOSPHATE SERPL-MCNC: 2.6 MG/DL (ref 2.5–4.9)
POTASSIUM SERPL-SCNC: 4.4 MMOL/L (ref 3.5–5.3)
SODIUM SERPL-SCNC: 140 MMOL/L (ref 136–145)

## 2024-02-27 PROCEDURE — 2500000004 HC RX 250 GENERAL PHARMACY W/ HCPCS (ALT 636 FOR OP/ED): Performed by: PSYCHIATRY & NEUROLOGY

## 2024-02-27 PROCEDURE — 82947 ASSAY GLUCOSE BLOOD QUANT: CPT

## 2024-02-27 PROCEDURE — 99239 HOSP IP/OBS DSCHRG MGMT >30: CPT | Performed by: INTERNAL MEDICINE

## 2024-02-27 PROCEDURE — 2500000001 HC RX 250 WO HCPCS SELF ADMINISTERED DRUGS (ALT 637 FOR MEDICARE OP): Performed by: PHYSICIAN ASSISTANT

## 2024-02-27 PROCEDURE — 2500000004 HC RX 250 GENERAL PHARMACY W/ HCPCS (ALT 636 FOR OP/ED): Performed by: PHYSICIAN ASSISTANT

## 2024-02-27 PROCEDURE — 80069 RENAL FUNCTION PANEL: CPT | Performed by: INTERNAL MEDICINE

## 2024-02-27 PROCEDURE — 36415 COLL VENOUS BLD VENIPUNCTURE: CPT | Performed by: INTERNAL MEDICINE

## 2024-02-27 RX ORDER — DRONABINOL 2.5 MG/1
2.5 CAPSULE ORAL
Qty: 28 CAPSULE | Refills: 0 | Status: SHIPPED | OUTPATIENT
Start: 2024-02-27 | End: 2024-03-12

## 2024-02-27 RX ADMIN — DRONABINOL 2.5 MG: 2.5 CAPSULE ORAL at 06:25

## 2024-02-27 RX ADMIN — ENOXAPARIN SODIUM 40 MG: 40 INJECTION SUBCUTANEOUS at 06:25

## 2024-02-27 RX ADMIN — STANDARDIZED SENNA CONCENTRATE 17.2 MG: 8.6 TABLET ORAL at 10:42

## 2024-02-27 RX ADMIN — LISINOPRIL 10 MG: 10 TABLET ORAL at 10:42

## 2024-02-27 ASSESSMENT — COGNITIVE AND FUNCTIONAL STATUS - GENERAL
HELP NEEDED FOR BATHING: A LOT
STANDING UP FROM CHAIR USING ARMS: A LITTLE
CLIMB 3 TO 5 STEPS WITH RAILING: A LOT
DRESSING REGULAR LOWER BODY CLOTHING: A LOT
MOVING TO AND FROM BED TO CHAIR: A LOT
PERSONAL GROOMING: A LOT
TOILETING: A LOT
DRESSING REGULAR UPPER BODY CLOTHING: A LOT
TURNING FROM BACK TO SIDE WHILE IN FLAT BAD: A LITTLE
HELP NEEDED FOR BATHING: A LOT
WALKING IN HOSPITAL ROOM: A LOT
STANDING UP FROM CHAIR USING ARMS: A LITTLE
TOILETING: A LOT
MOVING FROM LYING ON BACK TO SITTING ON SIDE OF FLAT BED WITH BEDRAILS: A LITTLE
DRESSING REGULAR UPPER BODY CLOTHING: A LOT
DRESSING REGULAR UPPER BODY CLOTHING: A LOT
EATING MEALS: A LITTLE
DAILY ACTIVITIY SCORE: 13
MOVING TO AND FROM BED TO CHAIR: A LOT
TURNING FROM BACK TO SIDE WHILE IN FLAT BAD: A LITTLE
PERSONAL GROOMING: A LOT
WALKING IN HOSPITAL ROOM: A LOT
EATING MEALS: A LITTLE
MOVING TO AND FROM BED TO CHAIR: A LOT
STANDING UP FROM CHAIR USING ARMS: A LITTLE
MOVING FROM LYING ON BACK TO SITTING ON SIDE OF FLAT BED WITH BEDRAILS: A LITTLE
TOILETING: A LOT
CLIMB 3 TO 5 STEPS WITH RAILING: A LOT
CLIMB 3 TO 5 STEPS WITH RAILING: A LOT
DRESSING REGULAR LOWER BODY CLOTHING: A LOT
WALKING IN HOSPITAL ROOM: A LOT
TURNING FROM BACK TO SIDE WHILE IN FLAT BAD: A LITTLE
MOVING FROM LYING ON BACK TO SITTING ON SIDE OF FLAT BED WITH BEDRAILS: A LITTLE
DRESSING REGULAR LOWER BODY CLOTHING: A LOT
PERSONAL GROOMING: A LOT
MOBILITY SCORE: 15
HELP NEEDED FOR BATHING: A LOT
EATING MEALS: A LITTLE

## 2024-02-27 ASSESSMENT — PAIN - FUNCTIONAL ASSESSMENT
PAIN_FUNCTIONAL_ASSESSMENT: WONG-BAKER FACES
PAIN_FUNCTIONAL_ASSESSMENT: WONG-BAKER FACES

## 2024-02-27 ASSESSMENT — PAIN SCALES - GENERAL
PAINLEVEL_OUTOF10: 0 - NO PAIN
PAINLEVEL_OUTOF10: 0 - NO PAIN

## 2024-02-27 ASSESSMENT — PAIN SCALES - WONG BAKER
WONGBAKER_NUMERICALRESPONSE: NO HURT
WONGBAKER_NUMERICALRESPONSE: NO HURT

## 2024-02-27 NOTE — CARE PLAN
The patient's goals for the shift include Sleep    The clinical goals for the shift include patient will emain safe for the entire shift      Problem: Pain - Adult  Goal: Verbalizes/displays adequate comfort level or baseline comfort level  Outcome: Progressing     Problem: Safety - Adult  Goal: Free from fall injury  Outcome: Progressing     Problem: Discharge Planning  Goal: Discharge to home or other facility with appropriate resources  Outcome: Progressing

## 2024-02-27 NOTE — DISCHARGE SUMMARY
Discharge Diagnosis  Altered mental status, unspecified altered mental status type    Issues Requiring Follow-Up  PCP follow up in 2 weeks    Discharge Meds     Your medication list        START taking these medications        Instructions Last Dose Given Next Dose Due   dronabinol 2.5 mg capsule  Commonly known as: Marinol      Take 1 capsule (2.5 mg) by mouth 2 times a day before meals for 14 days.              CONTINUE taking these medications        Instructions Last Dose Given Next Dose Due   atorvastatin 80 mg tablet  Commonly known as: Lipitor           lisinopril 10 mg tablet      TAKE 1 TABLET BY MOUTH EVERY DAY              STOP taking these medications      fenofibrate 145 mg tablet  Commonly known as: Tricor        hydroCHLOROthiazide 25 mg tablet  Commonly known as: HYDRODiuril                  Where to Get Your Medications        These medications were sent to  [facility]  [address], [address], [facility] [address]      Hours: 8 AM to 6 PM Mon-Fri, 9 AM to 1 PM Saturday Phone: [phone]   dronabinol 2.5 mg capsule         Test Results Pending At Discharge  Pending Labs       Order Current Status    Urinalysis with Reflex Culture and Microscopic In process            Hospital Course     Altered mental status  Alzheimer's dementia?  -Reviewed labs to her state of disinterest in oral intake, despite showing tremendous progress yesterday by ED and also breakfast  -Nutritionist consulted, recommend considering alternate route of nutrition.  Goal of care discussion  RDN recommendations:  Should pt require enteral nutrition: Jevity 1.5, goal rate 55ml/hr. (Start at 10 ml/hr and increase by 10ml/hr every 12 hrs unitl goal rate has been met. Formula at goal rate covers 100% of pts nutritional needs. Formulat at goal rate provides: 1980kcal, 84.2g protein and 1003ml h20. Flushes per MD rec or 150ml x 6)  Psychotic disorder: Continue mirtazapine 7.5 mg at bedtime, Haldol 1 mg IV every 8 hours  as needed agitation  Catatonia  -MRI brain negative for acute process  -neurology consult: Appreciate management recommendations, reconsulted for persistent altered mental status  -EEG for further evaluation negative for seizures  -Psych consult: Upon updating on current state, considering recommendation to SNF if continues to improve.  -pt off soft restraints     UTI  -urine cx negative  -ceftriaxone stopped     HTN  -continue lisinopril 10 mg daily     HLD  -continue statin, atorvastatin 80 mg daily      Patient is medically ready for discharge.  she will be discharged to SNF today.      Discharge time spent more than 30 minutes.    Pertinent Physical Exam At Time of Discharge  Physical Exam  Constitutional: Elderly female, alert, conversational.  Not in acute distress  Eyes: PERRLA, clear sclera  ENMT: Moist mucosal membranes, no exudate  Head / Neck: Atraumatic, normocephalic, supple neck, JVP not visualized  Lungs: Patent airways, CTABL  Heart: RRR, S1S2, no murmurs appreciated, palpable pulses in all extremities  GI: Soft, NT, ND, bowel sounds present in all quadrants  MSK: Moderate muscle wasting, moves all extremities freely  Extremities: Moderate muscle wasting in the extremities, no peripheral edema  : No Jones catheter inserted  Breast: Deferred  Neurological: AAO x 0,  facial muscles symmetrical, withdraws to pain stimuli, muscle tone intact, resists limb mobilization  Psychological: Appropriate mood and behavior  Outpatient Follow-Up  No future appointments.      Celsa Leyva MD

## 2024-02-27 NOTE — PROGRESS NOTES
02/27/24 1257   Discharge Planning   Type of Post Acute Facility Services Skilled nursing   [facility]   Does the patient need discharge transport arranged? Yes   RoundTrip coordination needed? Yes   Has discharge transport been arranged? Yes   What day is the transport expected? 02/27/24   What time is the transport expected? 1362

## 2024-02-27 NOTE — PROGRESS NOTES
Heather Romo is a 73 y.o. female on day 10 of admission presenting with Altered mental status, unspecified altered mental status type.      Subjective   Patient was seen and examined at bedside this morning, was alert, conversational, stated that she feels like eating, however did not witness any oral intake with the helping PCNA.       Objective     Last Recorded Vitals  /81   Pulse 82   Temp 36.3 °C (97.3 °F) (Temporal)   Resp 18   Wt 74 kg (163 lb 1.6 oz)   SpO2 97%   Intake/Output last 3 Shifts:  No intake or output data in the 24 hours ending 02/26/24 2023    Admission Weight  Weight: 74 kg (163 lb 1.6 oz) (02/13/24 1617)    Daily Weight  02/13/24 : 74 kg (163 lb 1.6 oz)    Image Results  EEG  IMPRESSION    This EEG is indicative of severe diffuse encephalopathy. No epileptiform abnormalities or lateralizing signs noted.    A full report will be scanned into the patient's chart at a later time.    This report has been interpreted and electronically signed by      Physical Exam  Constitutional: Elderly female, alert, conversational.  Not in acute distress  Eyes: PERRLA, clear sclera  ENMT: Moist mucosal membranes, no exudate  Head / Neck: Atraumatic, normocephalic, supple neck, JVP not visualized  Lungs: Patent airways, CTABL  Heart: RRR, S1S2, no murmurs appreciated, palpable pulses in all extremities  GI: Soft, NT, ND, bowel sounds present in all quadrants  MSK: Moderate muscle wasting, moves all extremities freely  Extremities: Moderate muscle wasting in the extremities, no peripheral edema  : No Jones catheter inserted  Breast: Deferred  Neurological: AAO x 0,  facial muscles symmetrical, withdraws to pain stimuli, muscle tone intact, resists limb mobilization  Psychological: Appropriate mood and behavior    Relevant Results           Scheduled medications  atorvastatin, 80 mg, oral, Nightly  dronabinol, 2.5 mg, oral, BID AC  enoxaparin, 40 mg, subcutaneous, q24h  lisinopril, 10 mg, oral,  Daily  sennosides, 2 tablet, oral, BID      Continuous medications     PRN medications  PRN medications: acetaminophen **OR** acetaminophen **OR** acetaminophen, haloperidol lactate, hydrALAZINE, hydrALAZINE, melatonin      Assessment/Plan      73 y.o. female presenting with altered mental status and confusion.  This patient has a history of a remote TIA about 10 years ago, hypertension, hyperlipidemia, hyperglycemia, CKD and Charcot-Rylie-Tooth syndrome with foot drop.  History is provided by the patient but also by her sister.  Patient lives alone in her own home.  The sister states that 8 days ago the patient was not answering her phone which is unusual for her.           Principal Problem:    Altered mental status, unspecified altered mental status type  Active Problems:    Confusion    AMS (altered mental status)      Altered mental status  Alzheimer's dementia?  -Reviewed labs to her state of disinterest in oral intake, despite showing tremendous progress yesterday by ED and also breakfast  -Nutritionist consulted, recommend considering alternate route of nutrition.  Goal of care discussion  RDN recommendations:  Should pt require enteral nutrition: Jevity 1.5, goal rate 55ml/hr. (Start at 10 ml/hr and increase by 10ml/hr every 12 hrs unitl goal rate has been met. Formula at goal rate covers 100% of pts nutritional needs. Formulat at goal rate provides: 1980kcal, 84.2g protein and 1003ml h20. Flushes per MD rec or 150ml x 6)  Psychotic disorder: Continue mirtazapine 7.5 mg at bedtime, Haldol 1 mg IV every 8 hours as needed agitation  Catatonia  -MRI brain negative for acute process  -neurology consult: Appreciate management recommendations, reconsulted for persistent altered mental status  -EEG for further evaluation negative for seizures  -Psych consult: Upon updating on current state, considering recommendation to SNF if continues to improve.  -pt off soft restraints     UTI  -urine cx negative  -ceftriaxone  stopped     HTN  -continue lisinopril 10 mg daily     HLD  -continue statin, atorvastatin 80 mg daily     Diet  -Regular     DVT ppx  -Lovenox 40 mg subcu daily     Disposition: Presenting with catatonia, with altered mental status, will need discussion with sister regarding goals of care as patient fluctuated between state of good oral intake and  of disinterest regarding food intake.     Malnutrition Diagnosis Status: New  Malnutrition Diagnosis: Severe malnutrition related to acute disease or injury  As Evidenced by: prolonged poor intake of < 50% of estimated energy needs in > 5 days and mild to moderate subcutaneous fat loss present  I agree with the dietitian's malnutrition diagnosis.            Gil Mora, DO

## 2024-02-27 NOTE — PROGRESS NOTES
Physical Therapy                 Therapy Communication Note    Patient Name: Heather Romo  MRN: [id number]  Today's Date: 2/27/2024     Discipline: Physical Therapy    Missed Visit Reason: Missed Visit Reason: Other (Comment) (Per RN, pt discharging to SNF, transport set up for 2:15pm. No further acute PT needs at this time. Defer tx at this time.)    Missed Time: Attempt

## 2024-02-27 NOTE — PROGRESS NOTES
02/27/24 1236   Discharge Planning   Patient expects to be discharged to: [facility]     Discharge plan: transportation scheduled for 2:15 pm to [facility]. Med team, facility, patient and family made aware.

## 2024-02-28 LAB
ATRIAL RATE: 125 BPM
P AXIS: 89 DEGREES
P OFFSET: 174 MS
P ONSET: 141 MS
PR INTERVAL: 170 MS
Q ONSET: 226 MS
QRS COUNT: 20 BEATS
QRS DURATION: 48 MS
QT INTERVAL: 402 MS
QTC CALCULATION(BAZETT): 580 MS
QTC FREDERICIA: 513 MS
R AXIS: 57 DEGREES
T AXIS: 78 DEGREES
T OFFSET: 427 MS
VENTRICULAR RATE: 125 BPM

## 2025-07-29 ENCOUNTER — HOSPITAL ENCOUNTER (OUTPATIENT)
Dept: RADIOLOGY | Facility: HOSPITAL | Age: 75
Discharge: HOME | End: 2025-07-29
Payer: COMMERCIAL

## 2025-07-29 DIAGNOSIS — R26.81 UNSTEADINESS ON FEET: ICD-10-CM

## 2025-07-29 PROCEDURE — 73700 CT LOWER EXTREMITY W/O DYE: CPT | Mod: LT
